# Patient Record
Sex: MALE | Race: WHITE | Employment: FULL TIME
[De-identification: names, ages, dates, MRNs, and addresses within clinical notes are randomized per-mention and may not be internally consistent; named-entity substitution may affect disease eponyms.]

---

## 2017-01-12 ENCOUNTER — OFFICE VISIT (OUTPATIENT)
Dept: INTERNAL MEDICINE | Facility: CLINIC | Age: 27
End: 2017-01-12

## 2017-01-12 VITALS
DIASTOLIC BLOOD PRESSURE: 80 MMHG | WEIGHT: 203 LBS | BODY MASS INDEX: 29.98 KG/M2 | HEART RATE: 94 BPM | SYSTOLIC BLOOD PRESSURE: 128 MMHG

## 2017-01-12 DIAGNOSIS — E66.3 OVERWEIGHT (BMI 25.0-29.9): ICD-10-CM

## 2017-01-12 DIAGNOSIS — S09.93XD FACIAL INJURY, SUBSEQUENT ENCOUNTER: Primary | ICD-10-CM

## 2017-01-12 PROBLEM — S09.93XA FACIAL INJURY: Status: ACTIVE | Noted: 2017-01-12

## 2017-01-12 PROCEDURE — 99213 OFFICE O/P EST LOW 20 MIN: CPT | Performed by: INTERNAL MEDICINE

## 2017-01-12 ASSESSMENT — ENCOUNTER SYMPTOMS
BACK PAIN: 0
ABDOMINAL PAIN: 0
PHOTOPHOBIA: 0
EYE ITCHING: 0
APNEA: 0
ABDOMINAL DISTENTION: 0
CHOKING: 0
EYE DISCHARGE: 0
CHEST TIGHTNESS: 0
EYE REDNESS: 1
EYE PAIN: 0

## 2017-01-12 ASSESSMENT — PATIENT HEALTH QUESTIONNAIRE - PHQ9
SUM OF ALL RESPONSES TO PHQ QUESTIONS 1-9: 0
2. FEELING DOWN, DEPRESSED OR HOPELESS: 0
SUM OF ALL RESPONSES TO PHQ9 QUESTIONS 1 & 2: 0
1. LITTLE INTEREST OR PLEASURE IN DOING THINGS: 0

## 2017-11-25 ENCOUNTER — HOSPITAL ENCOUNTER (EMERGENCY)
Age: 27
Discharge: HOME | End: 2017-11-25
Attending: EMERGENCY MEDICINE
Payer: COMMERCIAL

## 2017-11-25 ENCOUNTER — APPOINTMENT (OUTPATIENT)
Dept: GENERAL RADIOLOGY | Age: 27
End: 2017-11-25
Payer: COMMERCIAL

## 2017-11-25 VITALS
HEART RATE: 74 BPM | SYSTOLIC BLOOD PRESSURE: 146 MMHG | WEIGHT: 200 LBS | HEIGHT: 67 IN | OXYGEN SATURATION: 98 % | BODY MASS INDEX: 31.39 KG/M2 | DIASTOLIC BLOOD PRESSURE: 73 MMHG | RESPIRATION RATE: 16 BRPM | TEMPERATURE: 97.5 F

## 2017-11-25 DIAGNOSIS — M54.6 ACUTE BILATERAL THORACIC BACK PAIN: Primary | ICD-10-CM

## 2017-11-25 PROCEDURE — 6370000000 HC RX 637 (ALT 250 FOR IP): Performed by: NURSE PRACTITIONER

## 2017-11-25 PROCEDURE — 99283 EMERGENCY DEPT VISIT LOW MDM: CPT

## 2017-11-25 PROCEDURE — 72072 X-RAY EXAM THORAC SPINE 3VWS: CPT

## 2017-11-25 RX ORDER — CYCLOBENZAPRINE HCL 10 MG
10 TABLET ORAL ONCE
Status: COMPLETED | OUTPATIENT
Start: 2017-11-25 | End: 2017-11-25

## 2017-11-25 RX ORDER — NAPROXEN 500 MG/1
500 TABLET ORAL 2 TIMES DAILY
Qty: 20 TABLET | Refills: 0 | Status: SHIPPED | OUTPATIENT
Start: 2017-11-25 | End: 2019-10-23

## 2017-11-25 RX ORDER — CYCLOBENZAPRINE HCL 10 MG
10 TABLET ORAL 3 TIMES DAILY PRN
Qty: 15 TABLET | Refills: 0 | Status: SHIPPED | OUTPATIENT
Start: 2017-11-25 | End: 2019-10-23

## 2017-11-25 RX ORDER — NAPROXEN 250 MG/1
500 TABLET ORAL ONCE
Status: COMPLETED | OUTPATIENT
Start: 2017-11-25 | End: 2017-11-25

## 2017-11-25 RX ADMIN — NAPROXEN 500 MG: 250 TABLET ORAL at 21:59

## 2017-11-25 RX ADMIN — CYCLOBENZAPRINE HYDROCHLORIDE 10 MG: 10 TABLET, FILM COATED ORAL at 21:59

## 2017-11-25 ASSESSMENT — ENCOUNTER SYMPTOMS
NAUSEA: 0
SHORTNESS OF BREATH: 0
COUGH: 0
BACK PAIN: 1
TROUBLE SWALLOWING: 0
VOMITING: 0

## 2017-11-25 ASSESSMENT — PAIN DESCRIPTION - FREQUENCY: FREQUENCY: CONTINUOUS

## 2017-11-25 ASSESSMENT — PAIN SCALES - GENERAL
PAINLEVEL_OUTOF10: 9
PAINLEVEL_OUTOF10: 10
PAINLEVEL_OUTOF10: 10

## 2017-11-25 ASSESSMENT — PAIN DESCRIPTION - DESCRIPTORS: DESCRIPTORS: BURNING

## 2017-11-25 ASSESSMENT — PAIN DESCRIPTION - LOCATION: LOCATION: BACK

## 2017-11-25 ASSESSMENT — PAIN DESCRIPTION - ORIENTATION: ORIENTATION: UPPER

## 2017-11-26 NOTE — ED PROVIDER NOTES
[address]  eMERGENCY dEPARTMENT eNCOUnter   Independent Attestation     Pt Name: Sheyla Buck  MRN: [id number]  [place] 1990  Date of evaluation: 11/25/17       Sheyla Buck is a 33 y.o. male who presents with Back Pain (c/o upper back pain x 1 month, denies trauma, has taken tylenol and motrin without relief)      Vitals:   Vitals:    11/25/17 2137   BP: (!) 146/73   Pulse: 74   Resp: 16   Temp: 97.5 °F (36.4 °C)   SpO2: 98%   Weight: 200 lb (90.7 kg)   Height: 5' 7\" (1.702 m)       Impression:   1. Acute bilateral thoracic back pain          Based on the medical record, the care appears appropriate. I was personally available for consultation in the Emergency Department.     Malcolm Avila MD  Attending Emergency  Physician                Malcolm Avila MD  11/25/17 2105
without signs of spinal cord compression, cauda equina syndrome, infection, aneurysm or other serious etiology. The patient is neurologically intact. Given the extremely low risk of these diagnoses further testing and evaluation for these possibilities does not appear to be indicated at this time. The patient has been instructed to return if the symptoms worsen or change in any way. Vitals:    Vitals:    11/25/17 2137   BP: (!) 146/73   Pulse: 74   Resp: 16   Temp: 97.5 °F (36.4 °C)   SpO2: 98%   Weight: 200 lb (90.7 kg)   Height: 5' 7\" (1.702 m)       Vitals reviewed. PROCEDURES:  none    FINAL IMPRESSION      1.  Acute bilateral thoracic back pain          DISPOSITION/PLAN   DISPOSITION     PATIENT REFERRED TO:  Teresa Nevarez MD  [address] [facility]  [address] [facility] [address]  [address] [facility]  [phone]    Schedule an appointment as soon as possible for a visit   Follow up visit    [facility] ED  [facility] [address] [phone]    If symptoms worsen      DISCHARGE MEDICATIONS:  New Prescriptions    CYCLOBENZAPRINE (FLEXERIL) 10 MG TABLET    Take 1 tablet by mouth 3 times daily as needed for Muscle spasms    NAPROXEN (NAPROSYN) 500 MG TABLET    Take 1 tablet by mouth 2 times daily       (Please note that portions of this note were completed with a voice recognition program.  Efforts were made to edit the dictations but occasionally words are mis-transcribed.)    Jayden Anand, CNP  11/25/17 [address], CNP  11/25/17 5335

## 2017-11-29 ENCOUNTER — OFFICE VISIT (OUTPATIENT)
Dept: INTERNAL MEDICINE CLINIC | Age: 27
End: 2017-11-29
Payer: COMMERCIAL

## 2017-11-29 VITALS
HEART RATE: 78 BPM | SYSTOLIC BLOOD PRESSURE: 110 MMHG | BODY MASS INDEX: 32.26 KG/M2 | DIASTOLIC BLOOD PRESSURE: 80 MMHG | WEIGHT: 206 LBS | OXYGEN SATURATION: 99 %

## 2017-11-29 DIAGNOSIS — M54.6 ACUTE MIDLINE THORACIC BACK PAIN: ICD-10-CM

## 2017-11-29 DIAGNOSIS — R68.89 DECREASED EXERCISE TOLERANCE: ICD-10-CM

## 2017-11-29 DIAGNOSIS — M79.18 MUSCULOSKELETAL PAIN: Primary | ICD-10-CM

## 2017-11-29 DIAGNOSIS — Z00.00 PREVENTATIVE HEALTH CARE: ICD-10-CM

## 2017-11-29 DIAGNOSIS — Z87.891 FORMER SMOKER: ICD-10-CM

## 2017-11-29 DIAGNOSIS — R07.9 CHEST PAIN, UNSPECIFIED TYPE: ICD-10-CM
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
## 2017-11-29 PROCEDURE — G8484 FLU IMMUNIZE NO ADMIN: HCPCS | Performed by: NURSE PRACTITIONER

## 2017-11-29 PROCEDURE — G8427 DOCREV CUR MEDS BY ELIG CLIN: HCPCS | Performed by: NURSE PRACTITIONER

## 2017-11-29 PROCEDURE — 99213 OFFICE O/P EST LOW 20 MIN: CPT | Performed by: NURSE PRACTITIONER

## 2017-11-29 PROCEDURE — 1036F TOBACCO NON-USER: CPT | Performed by: NURSE PRACTITIONER

## 2017-11-29 PROCEDURE — G8417 CALC BMI ABV UP PARAM F/U: HCPCS | Performed by: NURSE PRACTITIONER

## 2017-11-29 ASSESSMENT — ENCOUNTER SYMPTOMS
BACK PAIN: 1
BLOOD IN STOOL: 0
COUGH: 0
EYE DISCHARGE: 0
ABDOMINAL PAIN: 0
SHORTNESS OF BREATH: 1

## 2017-11-29 NOTE — PROGRESS NOTES
[facility] PHYSICIANS  [facility]  [address]  [address] [facility] [address]  Dept: [phone]  Dept Fax: [phone]    Sonya Frausto is a 33 y.o. male who presents today for his medical conditions/complaints as noted below. Sonya Frausto is c/o of Back Pain (pt is here today for a follow up on an er visit for back pain)        HPI:     Patient presents with:    Pain constant, dur x 4 w; using otc nsaids, was brought to tears , went to ER. 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 Not taking musc relax at work d/t alertness , using at bedtime. When Breathe ,cough has pain in thoracic spine, L>R   Chest pain last night. Fast walking , running,  Dur x sev yrs.   , lifts a lot. History reviewed. No pertinent past medical history. History reviewed. No pertinent surgical history. Family History   Problem Relation Age of Onset    Heart Disease Maternal Grandmother     Heart Disease Maternal Grandfather     Heart Attack Father      'minor heart attack' 40s        Social History   Substance Use Topics    Smoking status: Former Smoker     Packs/day: 1.00     Years: 10.00     Types: Cigarettes    Smokeless tobacco: Never Used      Comment: quit oct 2016    Alcohol use 0.0 oz/week      Comment: social      Current Outpatient Prescriptions   Medication Sig Dispense Refill    naproxen (NAPROSYN) 500 MG tablet Take 1 tablet by mouth 2 times daily 20 tablet 0    cyclobenzaprine (FLEXERIL) 10 MG tablet Take 1 tablet by mouth 3 times daily as needed for Muscle spasms 15 tablet 0    Omeprazole 20 MG TBEC Take 1 tablet by mouth 2 times daily 60 tablet 3     No current facility-administered medications for this visit.       Allergies   Allergen Reactions    Penicillins      All cillins Subjective:      Review of Systems   Constitutional: Negative for activity change, chills, fatigue and fever. Eyes: Negative for discharge and visual disturbance. Respiratory: Positive for shortness of breath. Negative for cough. Cardiovascular: Positive for chest pain. Negative for leg swelling.        + decrease exerc tolerance    Gastrointestinal: Negative for abdominal pain and blood in stool. Endocrine: Negative for cold intolerance and heat intolerance. Genitourinary: Negative for dysuria and flank pain. Musculoskeletal: Positive for back pain. Negative for joint swelling and myalgias. Skin: Negative for pallor and rash. Neurological: Negative for dizziness and headaches. Psychiatric/Behavioral: Negative for hallucinations and suicidal ideas. Objective:     Physical Exam   Constitutional: He is oriented to person, place, and time. He appears well-developed and well-nourished. HENT:   Head: Normocephalic and atraumatic. Eyes: EOM are normal.   Cardiovascular: Normal rate, regular rhythm and normal heart sounds. Pulmonary/Chest: Effort normal and breath sounds normal.   Cp not reproducible    Abdominal: Soft. Bowel sounds are normal.   Musculoskeletal:        Arms:  Neurological: He is alert and oriented to person, place, and time. Skin: Skin is warm and dry. Psychiatric: He has a normal mood and affect. Nursing note and vitals reviewed.     /80   Pulse 78   Wt 206 lb (93.4 kg)   SpO2 99%   BMI 32.26 kg/m²     CBC:   Lab Results   Component Value Date    WBC 10.0 07/25/2016    RBC 5.14 07/25/2016    HGB 15.5 07/25/2016    HCT 45.0 07/25/2016    MCV 87.6 07/25/2016    MCH 30.1 07/25/2016    MCHC 34.4 07/25/2016    RDW 12.7 07/25/2016     07/25/2016    MPV 8.8 07/25/2016     CMP:    Lab Results   Component Value Date     09/29/2016    K 4.4 09/29/2016     09/29/2016    CO2 25 09/29/2016    BUN 20 09/29/2016    CREATININE 0.77 09/29/2016 GFRAA >60 09/29/2016    LABGLOM >60 09/29/2016    GLUCOSE 101 09/29/2016    PROT 7.2 09/29/2016    LABALBU 4.5 09/29/2016    CALCIUM 9.6 09/29/2016    BILITOT 0.65 09/29/2016    ALKPHOS 111 09/29/2016    AST 17 09/29/2016    ALT 35 09/29/2016     Lab Results   Component Value Date    LABA1C 5.1 03/26/2016     EXAMINATION:   3 VIEWS OF THE THORACIC SPINE       11/25/2017 10:08 pm       COMPARISON:   None.       HISTORY:   ORDERING SYSTEM PROVIDED HISTORY: pain   TECHNOLOGIST PROVIDED HISTORY:   Reason for exam:->pain   Ordering Physician Provided Reason for Exam: pain   Acuity: Unknown   Type of Exam: Initial   Additional signs and symptoms: Pt c/o burning sensation to upper posterior   thoracic spine for 4 weeks. No injury.       FINDINGS:   Thoracic vertebral bodies are normal in height and alignment.  Intervertebral   disc spaces are maintained.  No significant degenerative changes.  No   evidence of fracture. Pedicles are symmetric and intact.       Visualized lungs are clear.           Impression   Unremarkable examination of the thoracic spine               Assessment:      1. Musculoskeletal pain     2. Acute midline thoracic back pain     3. Overuse injury     4. Chest pain, unspecified type  NM Myocardial Spect Rest Exercise or Rx    EKG 12 Lead   5. Decreased exercise tolerance  NM Myocardial Spect Rest Exercise or Rx    EKG 12 Lead   6. Preventative health care  Comprehensive Metabolic Panel    CBC   7. Former smoker         Plan:      Next rx try tizanidine for muscle pain   Cont nsaids. NM stress for cp . Not reproduc   Electa Ar received counseling on the following healthy behaviors: medication adherence  Reviewed prior labs and health maintenance. Continue current medications, diet and exercise. Discussed use, benefit, and side effects of prescribed medications. Barriers to medication compliance addressed. Patient given educational materials - see patient instructions.     All patient questions answered. Patient voiced understanding. Return in about 1 month (around 12/29/2017). Orders Placed This Encounter   Procedures    NM Myocardial Spect Rest Exercise or Rx     Standing Status:   Future     Standing Expiration Date:   11/29/2018     Order Specific Question:   Reason for Exam?     Answer:   Chest pain    Comprehensive Metabolic Panel     Standing Status:   Future     Standing Expiration Date:   11/29/2018    CBC     Standing Status:   Future     Standing Expiration Date:   11/29/2018    EKG 12 Lead     Standing Status:   Future     Standing Expiration Date:   11/29/2018     Order Specific Question:   Reason for Exam?     Answer:   Chest pain     No orders of the defined types were placed in this encounter. Patient given verbal and/or written educational instructions. Follow up as directed. I have reviewed and agree with the nursing documentation.     Electronically signed by Km Benjamin NP on 11/29/2017 at 10:05 AM

## 2017-12-27 ENCOUNTER — TELEPHONE (OUTPATIENT)
Dept: INTERNAL MEDICINE CLINIC | Age: 27
End: 2017-12-27

## 2017-12-27 DIAGNOSIS — R07.9 CHEST PAIN, UNSPECIFIED TYPE: Primary | ICD-10-CM

## 2017-12-27 NOTE — TELEPHONE ENCOUNTER
Pt called me again and stated the insurance does not like the stress test that Kimo Boogie ordered for pt to complete. They would like pt to do stress test with treadmill instead. Please advise if ok to have pt do this testing instead.

## 2018-01-04 ENCOUNTER — HOSPITAL ENCOUNTER (OUTPATIENT)
Dept: NON INVASIVE DIAGNOSTICS | Age: 28
Discharge: HOME | End: 2018-01-04
Payer: COMMERCIAL

## 2018-01-04 DIAGNOSIS — R07.9 CHEST PAIN, UNSPECIFIED TYPE: ICD-10-CM

## 2018-01-04 PROCEDURE — 93017 CV STRESS TEST TRACING ONLY: CPT

## 2018-01-31 ENCOUNTER — TELEPHONE (OUTPATIENT)
Dept: INTERNAL MEDICINE CLINIC | Age: 28
End: 2018-01-31

## 2018-01-31 NOTE — LETTER
[facility]  [address] [place] [address]  Phone: [phone]  Fax: [phone]      January 31, 2018     [address] [id number]      Dear [facility]:    I am writing you because I have been informed of your missed appointment(s). We ask that you please call 24 hours in advance if you are unable to make your appointment so that we can give that time to another patient in need. We care about you and the management of your healthcare and want to make sure that you follow up as recommended. I have to also mention, that in an effort to provide quality care and timely appointments to all my patients, it is our policy that patients be discharged from the practice if they do not show for three scheduled appointments. You would be informed of this termination by mail, and would have 30 days from the date of discharge to locate another physician. We're sorry you were unable to keep your appointment and hope that you are doing well. We would like to continue treating your healthcare needs. Please call the office to let us know your plans for treatment and to reschedule your appointment.      Sincerely,        Elmo Modi NP

## 2018-05-13 ENCOUNTER — HOSPITAL ENCOUNTER (EMERGENCY)
Age: 28
Discharge: HOME | End: 2018-05-13
Attending: EMERGENCY MEDICINE
Payer: COMMERCIAL

## 2018-05-13 VITALS
TEMPERATURE: 97.6 F | BODY MASS INDEX: 30.8 KG/M2 | RESPIRATION RATE: 18 BRPM | SYSTOLIC BLOOD PRESSURE: 123 MMHG | HEART RATE: 65 BPM | WEIGHT: 220 LBS | DIASTOLIC BLOOD PRESSURE: 69 MMHG | OXYGEN SATURATION: 98 % | HEIGHT: 71 IN

## 2018-05-13 DIAGNOSIS — R19.7 DIARRHEA, UNSPECIFIED TYPE: Primary | ICD-10-CM

## 2018-05-13 LAB
ABSOLUTE EOS #: 0.1 K/UL (ref 0–0.4)
ABSOLUTE IMMATURE GRANULOCYTE: ABNORMAL K/UL (ref 0–0.3)
ABSOLUTE LYMPH #: 2.1 K/UL (ref 1–4.8)
ABSOLUTE MONO #: 0.8 K/UL (ref 0.1–1.3)
ALBUMIN SERPL-MCNC: 4.4 G/DL (ref 3.5–5.2)
ALBUMIN/GLOBULIN RATIO: ABNORMAL (ref 1–2.5)
ALP BLD-CCNC: 143 U/L (ref 40–129)
ALT SERPL-CCNC: 29 U/L (ref 5–41)
ANION GAP SERPL CALCULATED.3IONS-SCNC: 9 MMOL/L (ref 9–17)
AST SERPL-CCNC: 17 U/L
BASOPHILS # BLD: 1 % (ref 0–2)
BASOPHILS ABSOLUTE: 0 K/UL (ref 0–0.2)
BILIRUB SERPL-MCNC: 0.45 MG/DL (ref 0.3–1.2)
BUN BLDV-MCNC: 12 MG/DL (ref 6–20)
BUN/CREAT BLD: ABNORMAL (ref 9–20)
CALCIUM SERPL-MCNC: 8.7 MG/DL (ref 8.6–10.4)
CHLORIDE BLD-SCNC: 102 MMOL/L (ref 98–107)
CO2: 27 MMOL/L (ref 20–31)
CREAT SERPL-MCNC: 0.77 MG/DL (ref 0.7–1.2)
DIFFERENTIAL TYPE: ABNORMAL
EOSINOPHILS RELATIVE PERCENT: 2 % (ref 0–4)
GFR AFRICAN AMERICAN: >60 ML/MIN
GFR NON-AFRICAN AMERICAN: >60 ML/MIN
GFR SERPL CREATININE-BSD FRML MDRD: ABNORMAL ML/MIN/{1.73_M2}
GFR SERPL CREATININE-BSD FRML MDRD: ABNORMAL ML/MIN/{1.73_M2}
GLUCOSE BLD-MCNC: 97 MG/DL (ref 70–99)
HCT VFR BLD CALC: 47 % (ref 41–53)
HEMOGLOBIN: 16 G/DL (ref 13.5–17.5)
IMMATURE GRANULOCYTES: ABNORMAL %
LIPASE: 14 U/L (ref 13–60)
LYMPHOCYTES # BLD: 35 % (ref 24–44)
MCH RBC QN AUTO: 29.3 PG (ref 26–34)
MCHC RBC AUTO-ENTMCNC: 34.1 G/DL (ref 31–37)
MCV RBC AUTO: 85.9 FL (ref 80–100)
MONOCYTES # BLD: 14 % (ref 1–7)
NRBC AUTOMATED: ABNORMAL PER 100 WBC
PDW BLD-RTO: 13 % (ref 11.5–14.9)
PLATELET # BLD: 245 K/UL (ref 150–450)
PLATELET ESTIMATE: ABNORMAL
PMV BLD AUTO: 8.6 FL (ref 6–12)
POTASSIUM SERPL-SCNC: 4.2 MMOL/L (ref 3.7–5.3)
RBC # BLD: 5.47 M/UL (ref 4.5–5.9)
RBC # BLD: ABNORMAL 10*6/UL
SEG NEUTROPHILS: 48 % (ref 36–66)
SEGMENTED NEUTROPHILS ABSOLUTE COUNT: 2.9 K/UL (ref 1.3–9.1)
SODIUM BLD-SCNC: 138 MMOL/L (ref 135–144)
TOTAL PROTEIN: 7 G/DL (ref 6.4–8.3)
WBC # BLD: 6.1 K/UL (ref 3.5–11)
WBC # BLD: ABNORMAL 10*3/UL

## 2018-05-13 PROCEDURE — 2580000003 HC RX 258: Performed by: EMERGENCY MEDICINE

## 2018-05-13 PROCEDURE — 36415 COLL VENOUS BLD VENIPUNCTURE: CPT

## 2018-05-13 PROCEDURE — 99284 EMERGENCY DEPT VISIT MOD MDM: CPT

## 2018-05-13 PROCEDURE — 85025 COMPLETE CBC W/AUTO DIFF WBC: CPT

## 2018-05-13 PROCEDURE — 80053 COMPREHEN METABOLIC PANEL: CPT

## 2018-05-13 PROCEDURE — 6360000002 HC RX W HCPCS: Performed by: EMERGENCY MEDICINE

## 2018-05-13 PROCEDURE — 83690 ASSAY OF LIPASE: CPT

## 2018-05-13 RX ORDER — ONDANSETRON 4 MG/1
4 TABLET, ORALLY DISINTEGRATING ORAL ONCE
Status: COMPLETED | OUTPATIENT
Start: 2018-05-13 | End: 2018-05-13

## 2018-05-13 RX ORDER — 0.9 % SODIUM CHLORIDE 0.9 %
1000 INTRAVENOUS SOLUTION INTRAVENOUS ONCE
Status: COMPLETED | OUTPATIENT
Start: 2018-05-13 | End: 2018-05-13

## 2018-05-13 RX ADMIN — ONDANSETRON 4 MG: 4 TABLET, ORALLY DISINTEGRATING ORAL at 09:04

## 2018-05-13 RX ADMIN — SODIUM CHLORIDE 1000 ML: 9 INJECTION, SOLUTION INTRAVENOUS at 09:04

## 2018-05-13 ASSESSMENT — ENCOUNTER SYMPTOMS
ABDOMINAL PAIN: 0
VOMITING: 0
EYES NEGATIVE: 1
BACK PAIN: 0
COUGH: 0
SHORTNESS OF BREATH: 0
DIARRHEA: 1
RESPIRATORY NEGATIVE: 1

## 2018-09-25 ENCOUNTER — HOSPITAL ENCOUNTER (OUTPATIENT)
Age: 28
Discharge: HOME | End: 2018-09-25
Payer: COMMERCIAL

## 2018-09-25 LAB
ALBUMIN SERPL-MCNC: 4.4 G/DL (ref 3.5–5.2)
ALBUMIN/GLOBULIN RATIO: ABNORMAL (ref 1–2.5)
ALP BLD-CCNC: 132 U/L (ref 40–129)
ALT SERPL-CCNC: 41 U/L (ref 5–41)
ANION GAP SERPL CALCULATED.3IONS-SCNC: 11 MMOL/L (ref 9–17)
AST SERPL-CCNC: 19 U/L
BILIRUB SERPL-MCNC: 0.55 MG/DL (ref 0.3–1.2)
BUN BLDV-MCNC: 15 MG/DL (ref 6–20)
BUN/CREAT BLD: ABNORMAL (ref 9–20)
CALCIUM SERPL-MCNC: 9.6 MG/DL (ref 8.6–10.4)
CHLORIDE BLD-SCNC: 102 MMOL/L (ref 98–107)
CO2: 27 MMOL/L (ref 20–31)
CREAT SERPL-MCNC: 0.83 MG/DL (ref 0.7–1.2)
GFR AFRICAN AMERICAN: >60 ML/MIN
GFR NON-AFRICAN AMERICAN: >60 ML/MIN
GFR SERPL CREATININE-BSD FRML MDRD: ABNORMAL ML/MIN/{1.73_M2}
GFR SERPL CREATININE-BSD FRML MDRD: ABNORMAL ML/MIN/{1.73_M2}
GLUCOSE BLD-MCNC: 105 MG/DL (ref 70–99)
HCT VFR BLD CALC: 46.7 % (ref 41–53)
HEMOGLOBIN: 16 G/DL (ref 13.5–17.5)
MCH RBC QN AUTO: 29.5 PG (ref 26–34)
MCHC RBC AUTO-ENTMCNC: 34.2 G/DL (ref 31–37)
MCV RBC AUTO: 86.3 FL (ref 80–100)
NRBC AUTOMATED: NORMAL PER 100 WBC
PDW BLD-RTO: 13.1 % (ref 11.5–14.9)
PLATELET # BLD: 276 K/UL (ref 150–450)
PMV BLD AUTO: 8.4 FL (ref 6–12)
POTASSIUM SERPL-SCNC: 4.5 MMOL/L (ref 3.7–5.3)
RBC # BLD: 5.41 M/UL (ref 4.5–5.9)
SODIUM BLD-SCNC: 140 MMOL/L (ref 135–144)
TOTAL PROTEIN: 7.3 G/DL (ref 6.4–8.3)
TSH SERPL DL<=0.05 MIU/L-ACNC: 1.81 MIU/L (ref 0.3–5)
WBC # BLD: 6.2 K/UL (ref 3.5–11)

## 2018-09-25 PROCEDURE — 84443 ASSAY THYROID STIM HORMONE: CPT

## 2018-09-25 PROCEDURE — 85027 COMPLETE CBC AUTOMATED: CPT

## 2018-09-25 PROCEDURE — 80053 COMPREHEN METABOLIC PANEL: CPT

## 2018-09-25 PROCEDURE — 36415 COLL VENOUS BLD VENIPUNCTURE: CPT

## 2018-09-25 PROCEDURE — 83516 IMMUNOASSAY NONANTIBODY: CPT

## 2018-09-26 LAB — TISSUE TRANSGLUTAMINASE IGA: 0.6 U/ML

## 2019-10-14 ENCOUNTER — HOSPITAL ENCOUNTER (EMERGENCY)
Age: 29
Discharge: HOME | End: 2019-10-14
Attending: EMERGENCY MEDICINE
Payer: COMMERCIAL

## 2019-10-14 ENCOUNTER — APPOINTMENT (OUTPATIENT)
Dept: GENERAL RADIOLOGY | Age: 29
End: 2019-10-14
Payer: COMMERCIAL

## 2019-10-14 VITALS
TEMPERATURE: 98.6 F | RESPIRATION RATE: 18 BRPM | BODY MASS INDEX: 30.31 KG/M2 | HEART RATE: 68 BPM | SYSTOLIC BLOOD PRESSURE: 136 MMHG | WEIGHT: 200 LBS | HEIGHT: 68 IN | OXYGEN SATURATION: 97 % | DIASTOLIC BLOOD PRESSURE: 76 MMHG

## 2019-10-14 DIAGNOSIS — R42 DIZZINESS: Primary | ICD-10-CM

## 2019-10-14 LAB
ABSOLUTE EOS #: 0.2 K/UL (ref 0–0.4)
ABSOLUTE IMMATURE GRANULOCYTE: ABNORMAL K/UL (ref 0–0.3)
ABSOLUTE LYMPH #: 4.1 K/UL (ref 1–4.8)
ABSOLUTE MONO #: 1.5 K/UL (ref 0.1–1.3)
ANION GAP SERPL CALCULATED.3IONS-SCNC: 14 MMOL/L (ref 9–17)
BASOPHILS # BLD: 1 % (ref 0–2)
BASOPHILS ABSOLUTE: 0.1 K/UL (ref 0–0.2)
BUN BLDV-MCNC: 16 MG/DL (ref 6–20)
BUN/CREAT BLD: NORMAL (ref 9–20)
CALCIUM SERPL-MCNC: 9.3 MG/DL (ref 8.6–10.4)
CHLORIDE BLD-SCNC: 100 MMOL/L (ref 98–107)
CO2: 26 MMOL/L (ref 20–31)
CREAT SERPL-MCNC: 1.12 MG/DL (ref 0.7–1.2)
D-DIMER QUANTITATIVE: <0.27 MG/L FEU (ref 0–0.59)
DIFFERENTIAL TYPE: ABNORMAL
EOSINOPHILS RELATIVE PERCENT: 1 % (ref 0–4)
GFR AFRICAN AMERICAN: >60 ML/MIN
GFR NON-AFRICAN AMERICAN: >60 ML/MIN
GFR SERPL CREATININE-BSD FRML MDRD: NORMAL ML/MIN/{1.73_M2}
GFR SERPL CREATININE-BSD FRML MDRD: NORMAL ML/MIN/{1.73_M2}
GLUCOSE BLD-MCNC: 99 MG/DL (ref 70–99)
HCT VFR BLD CALC: 42.7 % (ref 41–53)
HEMOGLOBIN: 14.5 G/DL (ref 13.5–17.5)
IMMATURE GRANULOCYTES: ABNORMAL %
LYMPHOCYTES # BLD: 27 % (ref 24–44)
MCH RBC QN AUTO: 29 PG (ref 26–34)
MCHC RBC AUTO-ENTMCNC: 34 G/DL (ref 31–37)
MCV RBC AUTO: 85.2 FL (ref 80–100)
MONOCYTES # BLD: 10 % (ref 1–7)
MYOGLOBIN: 25 NG/ML (ref 28–72)
NRBC AUTOMATED: ABNORMAL PER 100 WBC
PDW BLD-RTO: 12.6 % (ref 11.5–14.9)
PLATELET # BLD: 300 K/UL (ref 150–450)
PLATELET ESTIMATE: ABNORMAL
PMV BLD AUTO: 8.5 FL (ref 6–12)
POTASSIUM SERPL-SCNC: 3.8 MMOL/L (ref 3.7–5.3)
RBC # BLD: 5.02 M/UL (ref 4.5–5.9)
RBC # BLD: ABNORMAL 10*6/UL
SEG NEUTROPHILS: 61 % (ref 36–66)
SEGMENTED NEUTROPHILS ABSOLUTE COUNT: 9 K/UL (ref 1.3–9.1)
SODIUM BLD-SCNC: 140 MMOL/L (ref 135–144)
THYROXINE, FREE: 1.38 NG/DL (ref 0.93–1.7)
TROPONIN INTERP: ABNORMAL
TROPONIN T: ABNORMAL NG/ML
TROPONIN, HIGH SENSITIVITY: <6 NG/L (ref 0–22)
TSH SERPL DL<=0.05 MIU/L-ACNC: 4.23 MIU/L (ref 0.3–5)
WBC # BLD: 14.9 K/UL (ref 3.5–11)
WBC # BLD: ABNORMAL 10*3/UL

## 2019-10-14 PROCEDURE — 85379 FIBRIN DEGRADATION QUANT: CPT

## 2019-10-14 PROCEDURE — 71046 X-RAY EXAM CHEST 2 VIEWS: CPT

## 2019-10-14 PROCEDURE — 84439 ASSAY OF FREE THYROXINE: CPT

## 2019-10-14 PROCEDURE — 93005 ELECTROCARDIOGRAM TRACING: CPT | Performed by: EMERGENCY MEDICINE

## 2019-10-14 PROCEDURE — 84484 ASSAY OF TROPONIN QUANT: CPT

## 2019-10-14 PROCEDURE — 80048 BASIC METABOLIC PNL TOTAL CA: CPT

## 2019-10-14 PROCEDURE — 83874 ASSAY OF MYOGLOBIN: CPT

## 2019-10-14 PROCEDURE — 85025 COMPLETE CBC W/AUTO DIFF WBC: CPT

## 2019-10-14 PROCEDURE — 36415 COLL VENOUS BLD VENIPUNCTURE: CPT

## 2019-10-14 PROCEDURE — 6370000000 HC RX 637 (ALT 250 FOR IP): Performed by: EMERGENCY MEDICINE

## 2019-10-14 PROCEDURE — 99285 EMERGENCY DEPT VISIT HI MDM: CPT

## 2019-10-14 PROCEDURE — 84443 ASSAY THYROID STIM HORMONE: CPT

## 2019-10-14 RX ORDER — MECLIZINE HYDROCHLORIDE 25 MG/1
25 TABLET ORAL 3 TIMES DAILY PRN
Qty: 30 TABLET | Refills: 0 | Status: SHIPPED | OUTPATIENT
Start: 2019-10-14 | End: 2019-10-24

## 2019-10-14 RX ORDER — MECLIZINE HYDROCHLORIDE 25 MG/1
25 TABLET ORAL ONCE
Status: COMPLETED | OUTPATIENT
Start: 2019-10-14 | End: 2019-10-14

## 2019-10-14 RX ADMIN — MECLIZINE HYDROCHLORIDE 25 MG: 25 TABLET ORAL at 19:41

## 2019-10-14 ASSESSMENT — PAIN SCALES - GENERAL: PAINLEVEL_OUTOF10: 4

## 2019-10-14 ASSESSMENT — ENCOUNTER SYMPTOMS
NAUSEA: 0
FACIAL SWELLING: 0
CHEST TIGHTNESS: 0
SORE THROAT: 0
EYE DISCHARGE: 0
EYE REDNESS: 0
SHORTNESS OF BREATH: 1
VOMITING: 0
SINUS PRESSURE: 0
EYE PAIN: 0
WHEEZING: 0
ABDOMINAL PAIN: 0
CONSTIPATION: 0
BACK PAIN: 0
COLOR CHANGE: 0
TROUBLE SWALLOWING: 0
DIARRHEA: 0
RHINORRHEA: 0
COUGH: 0
BLOOD IN STOOL: 0

## 2019-10-14 ASSESSMENT — PAIN DESCRIPTION - PAIN TYPE: TYPE: ACUTE PAIN

## 2019-10-14 ASSESSMENT — PAIN DESCRIPTION - LOCATION: LOCATION: CHEST

## 2019-10-14 ASSESSMENT — PAIN DESCRIPTION - DESCRIPTORS: DESCRIPTORS: HEAVINESS

## 2019-10-15 ENCOUNTER — TELEPHONE (OUTPATIENT)
Dept: INTERNAL MEDICINE CLINIC | Age: 29
End: 2019-10-15

## 2019-10-15 LAB
EKG ATRIAL RATE: 86 BPM
EKG P AXIS: 50 DEGREES
EKG P-R INTERVAL: 132 MS
EKG Q-T INTERVAL: 358 MS
EKG QRS DURATION: 88 MS
EKG QTC CALCULATION (BAZETT): 428 MS
EKG R AXIS: 55 DEGREES
EKG T AXIS: 13 DEGREES
EKG VENTRICULAR RATE: 86 BPM

## 2019-10-15 PROCEDURE — 93010 ELECTROCARDIOGRAM REPORT: CPT | Performed by: INTERNAL MEDICINE

## 2019-10-23 ENCOUNTER — OFFICE VISIT (OUTPATIENT)
Dept: INTERNAL MEDICINE CLINIC | Age: 29
End: 2019-10-23
Payer: COMMERCIAL

## 2019-10-23 VITALS
SYSTOLIC BLOOD PRESSURE: 128 MMHG | HEART RATE: 96 BPM | WEIGHT: 234 LBS | HEIGHT: 68 IN | BODY MASS INDEX: 35.46 KG/M2 | DIASTOLIC BLOOD PRESSURE: 88 MMHG | OXYGEN SATURATION: 95 %

## 2019-10-23 DIAGNOSIS — R00.2 HEART PALPITATIONS: ICD-10-CM

## 2019-10-23 DIAGNOSIS — F32.A DEPRESSION, UNSPECIFIED DEPRESSION TYPE: ICD-10-CM

## 2019-10-23 DIAGNOSIS — F41.0 PANIC ATTACKS: Primary | ICD-10-CM

## 2019-10-23 PROCEDURE — 99214 OFFICE O/P EST MOD 30 MIN: CPT | Performed by: INTERNAL MEDICINE

## 2019-10-23 PROCEDURE — G8427 DOCREV CUR MEDS BY ELIG CLIN: HCPCS | Performed by: INTERNAL MEDICINE

## 2019-10-23 PROCEDURE — G8484 FLU IMMUNIZE NO ADMIN: HCPCS | Performed by: INTERNAL MEDICINE

## 2019-10-23 PROCEDURE — G8417 CALC BMI ABV UP PARAM F/U: HCPCS | Performed by: INTERNAL MEDICINE

## 2019-10-23 PROCEDURE — 1036F TOBACCO NON-USER: CPT | Performed by: INTERNAL MEDICINE

## 2019-10-23 RX ORDER — FLUOXETINE HYDROCHLORIDE 20 MG/1
20 CAPSULE ORAL DAILY
Qty: 30 CAPSULE | Refills: 3 | Status: SHIPPED | OUTPATIENT
Start: 2019-10-23 | End: 2019-12-05 | Stop reason: SDUPTHER

## 2019-10-23 ASSESSMENT — PATIENT HEALTH QUESTIONNAIRE - PHQ9
SUM OF ALL RESPONSES TO PHQ QUESTIONS 1-9: 0
1. LITTLE INTEREST OR PLEASURE IN DOING THINGS: 0
SUM OF ALL RESPONSES TO PHQ QUESTIONS 1-9: 0
2. FEELING DOWN, DEPRESSED OR HOPELESS: 0
SUM OF ALL RESPONSES TO PHQ9 QUESTIONS 1 & 2: 0

## 2019-10-24 ASSESSMENT — ENCOUNTER SYMPTOMS
EYE PAIN: 0
CHOKING: 0
EYE ITCHING: 0
BLOOD IN STOOL: 0
COUGH: 0
CONSTIPATION: 0
APNEA: 0
COLOR CHANGE: 0
ABDOMINAL PAIN: 0
CHEST TIGHTNESS: 0
EYE REDNESS: 0
ABDOMINAL DISTENTION: 0
EYE DISCHARGE: 0
SHORTNESS OF BREATH: 0
DIARRHEA: 0
BACK PAIN: 0

## 2019-11-07 ENCOUNTER — HOSPITAL ENCOUNTER (OUTPATIENT)
Dept: NON INVASIVE DIAGNOSTICS | Age: 29
Discharge: HOME | End: 2019-11-07
Payer: COMMERCIAL

## 2019-11-07 DIAGNOSIS — R00.2 HEART PALPITATIONS: ICD-10-CM

## 2019-11-07 PROCEDURE — 93226 XTRNL ECG REC<48 HR SCAN A/R: CPT

## 2019-11-07 PROCEDURE — 93225 XTRNL ECG REC<48 HRS REC: CPT

## 2019-11-13 ENCOUNTER — OFFICE VISIT (OUTPATIENT)
Dept: INTERNAL MEDICINE CLINIC | Age: 29
End: 2019-11-13
Payer: COMMERCIAL

## 2019-11-13 VITALS
HEIGHT: 68 IN | WEIGHT: 227 LBS | SYSTOLIC BLOOD PRESSURE: 122 MMHG | DIASTOLIC BLOOD PRESSURE: 74 MMHG | BODY MASS INDEX: 34.4 KG/M2

## 2019-11-13 DIAGNOSIS — F41.0 PANIC ATTACKS: Primary | ICD-10-CM

## 2019-11-13 PROCEDURE — 99213 OFFICE O/P EST LOW 20 MIN: CPT | Performed by: INTERNAL MEDICINE

## 2019-11-13 PROCEDURE — G8417 CALC BMI ABV UP PARAM F/U: HCPCS | Performed by: INTERNAL MEDICINE

## 2019-11-13 PROCEDURE — 1036F TOBACCO NON-USER: CPT | Performed by: INTERNAL MEDICINE

## 2019-11-13 PROCEDURE — G8427 DOCREV CUR MEDS BY ELIG CLIN: HCPCS | Performed by: INTERNAL MEDICINE

## 2019-11-13 PROCEDURE — G8484 FLU IMMUNIZE NO ADMIN: HCPCS | Performed by: INTERNAL MEDICINE

## 2019-11-24 ASSESSMENT — ENCOUNTER SYMPTOMS
EYE PAIN: 0
COUGH: 0
BACK PAIN: 0
COLOR CHANGE: 0
DIARRHEA: 0
APNEA: 0
ABDOMINAL DISTENTION: 0
CONSTIPATION: 0
CHEST TIGHTNESS: 0
ABDOMINAL PAIN: 0
SHORTNESS OF BREATH: 0
EYE REDNESS: 0
CHOKING: 0
EYE DISCHARGE: 0
BLOOD IN STOOL: 0
EYE ITCHING: 0

## 2019-12-05 ENCOUNTER — OFFICE VISIT (OUTPATIENT)
Dept: NEUROLOGY | Age: 29
End: 2019-12-05
Payer: COMMERCIAL

## 2019-12-05 VITALS
SYSTOLIC BLOOD PRESSURE: 123 MMHG | DIASTOLIC BLOOD PRESSURE: 79 MMHG | HEART RATE: 66 BPM | BODY MASS INDEX: 30.8 KG/M2 | WEIGHT: 220 LBS | HEIGHT: 71 IN

## 2019-12-05 DIAGNOSIS — R40.4 EPISODIC ALTERED AWARENESS: ICD-10-CM

## 2019-12-05 DIAGNOSIS — H53.8 BLURRED VISION: Primary | ICD-10-CM

## 2019-12-05 DIAGNOSIS — F41.0 PANIC ATTACKS: ICD-10-CM

## 2019-12-05 DIAGNOSIS — G43.009 MIGRAINE WITHOUT AURA AND WITHOUT STATUS MIGRAINOSUS, NOT INTRACTABLE: ICD-10-CM

## 2019-12-05 PROCEDURE — G8484 FLU IMMUNIZE NO ADMIN: HCPCS | Performed by: NURSE PRACTITIONER

## 2019-12-05 PROCEDURE — G8427 DOCREV CUR MEDS BY ELIG CLIN: HCPCS | Performed by: NURSE PRACTITIONER

## 2019-12-05 PROCEDURE — 99204 OFFICE O/P NEW MOD 45 MIN: CPT | Performed by: NURSE PRACTITIONER

## 2019-12-05 PROCEDURE — 1036F TOBACCO NON-USER: CPT | Performed by: NURSE PRACTITIONER

## 2019-12-05 PROCEDURE — G8417 CALC BMI ABV UP PARAM F/U: HCPCS | Performed by: NURSE PRACTITIONER

## 2019-12-05 RX ORDER — FLUOXETINE HYDROCHLORIDE 40 MG/1
40 CAPSULE ORAL DAILY
Qty: 30 CAPSULE | Refills: 5 | Status: SHIPPED | OUTPATIENT
Start: 2019-12-05 | End: 2020-05-19 | Stop reason: SDUPTHER

## 2019-12-05 RX ORDER — NAPROXEN 500 MG/1
TABLET ORAL
Refills: 0 | COMMUNITY
Start: 2019-12-02 | End: 2021-03-18 | Stop reason: ALTCHOICE

## 2019-12-05 RX ORDER — BUTALBITAL, ACETAMINOPHEN AND CAFFEINE 50; 325; 40 MG/1; MG/1; MG/1
1 TABLET ORAL EVERY 4 HOURS PRN
Qty: 180 TABLET | Refills: 3 | Status: SHIPPED | OUTPATIENT
Start: 2019-12-05 | End: 2020-06-04 | Stop reason: SDUPTHER

## 2019-12-24 ENCOUNTER — HOSPITAL ENCOUNTER (OUTPATIENT)
Dept: MRI IMAGING | Age: 29
Discharge: HOME | End: 2019-12-26
Payer: COMMERCIAL

## 2019-12-24 ENCOUNTER — HOSPITAL ENCOUNTER (OUTPATIENT)
Dept: NEUROLOGY | Age: 29
Discharge: HOME | End: 2019-12-24
Payer: COMMERCIAL

## 2019-12-24 DIAGNOSIS — H53.8 BLURRED VISION: ICD-10-CM

## 2019-12-24 DIAGNOSIS — R40.4 EPISODIC ALTERED AWARENESS: ICD-10-CM

## 2019-12-24 PROCEDURE — 95816 EEG AWAKE AND DROWSY: CPT | Performed by: PSYCHIATRY & NEUROLOGY

## 2019-12-24 PROCEDURE — 2580000003 HC RX 258: Performed by: NURSE PRACTITIONER

## 2019-12-24 PROCEDURE — 6360000004 HC RX CONTRAST MEDICATION: Performed by: NURSE PRACTITIONER

## 2019-12-24 PROCEDURE — A9579 GAD-BASE MR CONTRAST NOS,1ML: HCPCS | Performed by: NURSE PRACTITIONER

## 2019-12-24 PROCEDURE — 95816 EEG AWAKE AND DROWSY: CPT

## 2019-12-24 PROCEDURE — 70553 MRI BRAIN STEM W/O & W/DYE: CPT

## 2019-12-24 RX ORDER — SODIUM CHLORIDE 0.9 % (FLUSH) 0.9 %
10 SYRINGE (ML) INJECTION PRN
Status: DISCONTINUED | OUTPATIENT
Start: 2019-12-24 | End: 2019-12-27 | Stop reason: HOSPADM

## 2019-12-24 RX ADMIN — Medication 10 ML: at 11:13

## 2019-12-24 RX ADMIN — GADOTERIDOL 20 ML: 279.3 INJECTION, SOLUTION INTRAVENOUS at 11:13

## 2020-01-16 ENCOUNTER — OFFICE VISIT (OUTPATIENT)
Dept: NEUROLOGY | Age: 30
End: 2020-01-16
Payer: COMMERCIAL

## 2020-01-16 VITALS
BODY MASS INDEX: 32.06 KG/M2 | DIASTOLIC BLOOD PRESSURE: 76 MMHG | SYSTOLIC BLOOD PRESSURE: 130 MMHG | HEIGHT: 71 IN | WEIGHT: 229 LBS | HEART RATE: 74 BPM

## 2020-01-16 PROBLEM — G43.019 INTRACTABLE MIGRAINE WITHOUT AURA AND WITHOUT STATUS MIGRAINOSUS: Status: ACTIVE | Noted: 2019-12-05

## 2020-01-16 PROCEDURE — G8484 FLU IMMUNIZE NO ADMIN: HCPCS | Performed by: NURSE PRACTITIONER

## 2020-01-16 PROCEDURE — G8427 DOCREV CUR MEDS BY ELIG CLIN: HCPCS | Performed by: NURSE PRACTITIONER

## 2020-01-16 PROCEDURE — 99214 OFFICE O/P EST MOD 30 MIN: CPT | Performed by: NURSE PRACTITIONER

## 2020-01-16 PROCEDURE — G8417 CALC BMI ABV UP PARAM F/U: HCPCS | Performed by: NURSE PRACTITIONER

## 2020-01-16 PROCEDURE — 1036F TOBACCO NON-USER: CPT | Performed by: NURSE PRACTITIONER

## 2020-01-16 RX ORDER — TOPIRAMATE 25 MG/1
50 TABLET ORAL 2 TIMES DAILY
Qty: 120 TABLET | Refills: 3 | Status: SHIPPED | OUTPATIENT
Start: 2020-01-16 | End: 2020-05-19 | Stop reason: SDUPTHER
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
## 2020-02-11 ENCOUNTER — HOSPITAL ENCOUNTER (OUTPATIENT)
Dept: SLEEP CENTER | Age: 30
Discharge: HOME | End: 2020-02-13
Payer: COMMERCIAL

## 2020-02-11 PROCEDURE — 95810 POLYSOM 6/> YRS 4/> PARAM: CPT

## 2020-02-11 PROCEDURE — 95810 POLYSOM 6/> YRS 4/> PARAM: CPT | Performed by: PSYCHIATRY & NEUROLOGY

## 2020-02-12 VITALS
HEART RATE: 68 BPM | RESPIRATION RATE: 14 BRPM | DIASTOLIC BLOOD PRESSURE: 83 MMHG | SYSTOLIC BLOOD PRESSURE: 125 MMHG | HEIGHT: 71 IN | WEIGHT: 229 LBS | OXYGEN SATURATION: 98 % | BODY MASS INDEX: 32.06 KG/M2

## 2020-02-12 ASSESSMENT — SLEEP AND FATIGUE QUESTIONNAIRES
HOW LIKELY ARE YOU TO NOD OFF OR FALL ASLEEP IN A CAR, WHILE STOPPED FOR A FEW MINUTES IN TRAFFIC: 0
HOW LIKELY ARE YOU TO NOD OFF OR FALL ASLEEP WHILE SITTING QUIETLY AFTER LUNCH WITHOUT ALCOHOL: 1
HOW LIKELY ARE YOU TO NOD OFF OR FALL ASLEEP WHILE SITTING AND READING: 0
ESS TOTAL SCORE: 3
HOW LIKELY ARE YOU TO NOD OFF OR FALL ASLEEP WHILE SITTING INACTIVE IN A PUBLIC PLACE: 0
HOW LIKELY ARE YOU TO NOD OFF OR FALL ASLEEP WHILE LYING DOWN TO REST IN THE AFTERNOON WHEN CIRCUMSTANCES PERMIT: 1
HOW LIKELY ARE YOU TO NOD OFF OR FALL ASLEEP WHILE WATCHING TV: 1
HOW LIKELY ARE YOU TO NOD OFF OR FALL ASLEEP WHEN YOU ARE A PASSENGER IN A CAR FOR AN HOUR WITHOUT A BREAK: 0
HOW LIKELY ARE YOU TO NOD OFF OR FALL ASLEEP WHILE SITTING AND TALKING TO SOMEONE: 0

## 2020-02-19 LAB — STATUS: NORMAL

## 2020-02-23 ENCOUNTER — HOSPITAL ENCOUNTER (EMERGENCY)
Age: 30
Discharge: HOME | End: 2020-02-23
Attending: EMERGENCY MEDICINE
Payer: COMMERCIAL

## 2020-02-23 VITALS
OXYGEN SATURATION: 97 % | HEART RATE: 77 BPM | RESPIRATION RATE: 16 BRPM | WEIGHT: 213 LBS | SYSTOLIC BLOOD PRESSURE: 122 MMHG | HEIGHT: 71 IN | BODY MASS INDEX: 29.82 KG/M2 | TEMPERATURE: 98.2 F | DIASTOLIC BLOOD PRESSURE: 76 MMHG

## 2020-02-23 LAB
DIRECT EXAM: NORMAL
DIRECT EXAM: NORMAL
Lab: NORMAL
Lab: NORMAL
SPECIMEN DESCRIPTION: NORMAL
SPECIMEN DESCRIPTION: NORMAL

## 2020-02-23 PROCEDURE — 87880 STREP A ASSAY W/OPTIC: CPT

## 2020-02-23 PROCEDURE — 99283 EMERGENCY DEPT VISIT LOW MDM: CPT

## 2020-02-23 PROCEDURE — 96372 THER/PROPH/DIAG INJ SC/IM: CPT

## 2020-02-23 PROCEDURE — 6360000002 HC RX W HCPCS: Performed by: EMERGENCY MEDICINE

## 2020-02-23 PROCEDURE — 87804 INFLUENZA ASSAY W/OPTIC: CPT

## 2020-02-23 RX ORDER — DEXAMETHASONE SODIUM PHOSPHATE 4 MG/ML
4 INJECTION, SOLUTION INTRA-ARTICULAR; INTRALESIONAL; INTRAMUSCULAR; INTRAVENOUS; SOFT TISSUE ONCE
Status: COMPLETED | OUTPATIENT
Start: 2020-02-23 | End: 2020-02-23

## 2020-02-23 RX ADMIN — DEXAMETHASONE SODIUM PHOSPHATE 4 MG: 4 INJECTION, SOLUTION INTRA-ARTICULAR; INTRALESIONAL; INTRAMUSCULAR; INTRAVENOUS; SOFT TISSUE at 09:11

## 2020-02-23 ASSESSMENT — ENCOUNTER SYMPTOMS
VOMITING: 0
RHINORRHEA: 1
TROUBLE SWALLOWING: 0
SORE THROAT: 1
NAUSEA: 0
COUGH: 1
ABDOMINAL PAIN: 0

## 2020-02-23 ASSESSMENT — PAIN SCALES - GENERAL: PAINLEVEL_OUTOF10: 8

## 2020-02-23 NOTE — ED PROVIDER NOTES
[address] ED  eMERGENCY dEPARTMENT eNCOUnter      Pt Name: Francis Zambrano  MRN: [id number]  [place] 1990  Date of evaluation: 2/23/20      CHIEF COMPLAINT       Chief Complaint   Patient presents with    Pharyngitis    Nasal Congestion     HISTORY OF PRESENT ILLNESS   HPI 30 y.o. male comes the emergency department for evaluation of sore throat congestion cough. Symptoms been present for the last day. Pain rated as 8 out of 10. No fevers. No known sick contacts. No travel. He has been using some Plascencia's throat lozenges with minimal relief. REVIEW OF SYSTEMS     Review of Systems   Constitutional: Negative for fever. HENT: Positive for congestion, rhinorrhea and sore throat. Negative for trouble swallowing. Eyes: Negative for visual disturbance. Respiratory: Positive for cough. Cardiovascular: Negative for chest pain. Gastrointestinal: Negative for abdominal pain, nausea and vomiting. Genitourinary: Negative for hematuria. Skin: Negative for rash. Neurological: Negative for headaches. PAST MEDICAL HISTORY     Past Medical History:   Diagnosis Date    Anxiety     Headache        SURGICAL HISTORY     History reviewed. No pertinent surgical history. CURRENT MEDICATIONS       Previous Medications    BUTALBITAL-ACETAMINOPHEN-CAFFEINE (FIORICET, ESGIC) -40 MG PER TABLET    Take 1 tablet by mouth every 4 hours as needed for Headaches    CALCIUM-MAGNESIUM-VITAMIN D (CALCIUM 500 PO)    Take by mouth    FLUOXETINE (PROZAC) 40 MG CAPSULE    Take 1 capsule by mouth daily    MULTIPLE VITAMINS-MINERALS (MULTIVITAMIN ADULT PO)    Take by mouth    NAPROXEN (NAPROSYN) 500 MG TABLET    TAKE 1 TABLET BY MOUTH TWICE DAILY    POTASSIUM AMINOBENZOATE PO    Take by mouth    TOPIRAMATE (TOPAMAX) 25 MG TABLET    Take 2 tablets by mouth 2 times daily       ALLERGIES     is allergic to penicillins. FAMILY HISTORY     He indicated that the status of his father is unknown.  He 5' 11\" (1.803 m)       The patient was given the following medications while in the emergency department:  Orders Placed This Encounter   Medications    dexamethasone (DECADRON) injection 4 mg    Benzocaine-Menthol (SORE THROAT LOZENGES) 6-10 MG LOZG lozenge     Sig: Take 1 lozenge by mouth every 2 hours as needed for Sore Throat     Dispense:  18 lozenge     Refill:  0     -------------------------  CRITICAL CARE:   CONSULTS: None  PROCEDURES: Procedures     FINAL IMPRESSION      1.  Viral upper respiratory tract infection          DISPOSITION/PLAN   DISPOSITION Decision To Discharge 02/23/2020 09:00:42 AM      PATIENT REFERRED TO:  Veronica Davis, [address] [place], [place] [id number] [phone]    In 3 days      [facility] [place] [address] [phone]    If symptoms worsen      DISCHARGE MEDICATIONS:  New Prescriptions    BENZOCAINE-MENTHOL (SORE THROAT LOZENGES) 6-10 MG LOZG LOZENGE    Take 1 lozenge by mouth every 2 hours as needed for Sore Throat         Amberly Llanes MD  Attending Emergency Physician                      Amberly Llanes MD  02/23/20 [id number]

## 2020-03-10 ENCOUNTER — TELEPHONE (OUTPATIENT)
Dept: NEUROLOGY | Age: 30
End: 2020-03-10

## 2020-03-10 NOTE — TELEPHONE ENCOUNTER
----- Message from Autumn Gaspar sent at 2/18/2020 11:34 AM EST -----  Preliminary results of patient's in-lab sleep study from 2/11//2020 shows a MILD degree of Obstructive Sleep Apnea, with an Apnea Hypopnea Index of 5.6 events per hour. Please submit into EPIC an order for CPAP titration study (ORDER: SLE1) so that we may proceed to contact patient and schedule this treatment portion of the study. Thank you and have a great week.     Autumn Gaspar

## 2020-03-16 ENCOUNTER — HOSPITAL ENCOUNTER (OUTPATIENT)
Dept: SLEEP CENTER | Age: 30
Discharge: HOME | End: 2020-03-18
Payer: COMMERCIAL

## 2020-03-16 PROCEDURE — 95811 POLYSOM 6/>YRS CPAP 4/> PARM: CPT

## 2020-03-16 PROCEDURE — 95811 POLYSOM 6/>YRS CPAP 4/> PARM: CPT | Performed by: PSYCHIATRY & NEUROLOGY

## 2020-03-17 VITALS
WEIGHT: 229 LBS | BODY MASS INDEX: 32.06 KG/M2 | HEIGHT: 71 IN | HEART RATE: 55 BPM | RESPIRATION RATE: 14 BRPM | OXYGEN SATURATION: 97 %

## 2020-03-21 LAB — STATUS: NORMAL

## 2020-05-15 ENCOUNTER — HOSPITAL ENCOUNTER (EMERGENCY)
Age: 30
Discharge: HOME | End: 2020-05-15
Attending: EMERGENCY MEDICINE
Payer: COMMERCIAL

## 2020-05-15 ENCOUNTER — APPOINTMENT (OUTPATIENT)
Dept: GENERAL RADIOLOGY | Age: 30
End: 2020-05-15
Payer: COMMERCIAL

## 2020-05-15 VITALS
SYSTOLIC BLOOD PRESSURE: 123 MMHG | OXYGEN SATURATION: 98 % | RESPIRATION RATE: 18 BRPM | HEIGHT: 70 IN | BODY MASS INDEX: 32.21 KG/M2 | WEIGHT: 225 LBS | TEMPERATURE: 98.3 F | DIASTOLIC BLOOD PRESSURE: 62 MMHG | HEART RATE: 67 BPM

## 2020-05-15 PROCEDURE — 99284 EMERGENCY DEPT VISIT MOD MDM: CPT

## 2020-05-15 PROCEDURE — 71045 X-RAY EXAM CHEST 1 VIEW: CPT

## 2020-05-15 ASSESSMENT — ENCOUNTER SYMPTOMS
ABDOMINAL PAIN: 0
BACK PAIN: 0
SHORTNESS OF BREATH: 1
COUGH: 1
GASTROINTESTINAL NEGATIVE: 1
EYES NEGATIVE: 1

## 2020-05-15 ASSESSMENT — PAIN DESCRIPTION - PAIN TYPE: TYPE: ACUTE PAIN

## 2020-05-15 ASSESSMENT — PAIN DESCRIPTION - LOCATION: LOCATION: CHEST

## 2020-05-15 NOTE — ED PROVIDER NOTES
EMERGENCY DEPARTMENT ENCOUNTER    Pt Name: Carlton Felipe  MRN: [id number]  [place] 1990  Date of evaluation: 5/15/20  CHIEF COMPLAINT       Chief Complaint   Patient presents with    Shortness of Breath     X 2 DAY    Cough     X 2 DAY    Chest Pain     HISTORY OF PRESENT ILLNESS   The pt presents for evaluation of cough, congestion, chest pain, shortness of breath. Ongoing for 3-4 days. Gradual onset, gradually worsening. Pt does have family members that work at [facility] with likely positive covid exposures. No fever. Previously healthy. The history is provided by the patient. Cough   Cough characteristics:  Non-productive  Sputum characteristics:  Nondescript  Severity:  Moderate  Onset quality:  Gradual  Duration:  4 days  Timing:  Constant  Progression:  Worsening  Chronicity:  New  Relieved by:  Nothing  Worsened by:  Nothing  Associated symptoms: chest pain and shortness of breath    Associated symptoms: no chills, no fever, no headaches and no rash        REVIEW OF SYSTEMS     Review of Systems   Constitutional: Negative. Negative for chills and fever. HENT: Negative. Negative for congestion. Eyes: Negative. Respiratory: Positive for cough and shortness of breath. Cardiovascular: Positive for chest pain. Gastrointestinal: Negative. Negative for abdominal pain. Genitourinary: Negative. Musculoskeletal: Negative. Negative for back pain. Skin: Negative. Negative for rash. Neurological: Negative. Negative for headaches. All other systems reviewed and are negative. PASTMEDICAL HISTORY     Past Medical History:   Diagnosis Date    Anxiety     Headache      Past Problem List  Patient Active Problem List   Diagnosis Code    Facial injury S09. 80XA    Former smoker Z87.891    Panic attacks F41.0    Blurred vision H53.8    Episodic altered awareness R40.4    Intractable migraine without aura and without status migrainosus G43.019    Excessive underlying malignancy, immunocompromised, Age > 60 years. I do not feel the patient has an emergent medical condition at this time. At this time there is a critical shortage of SARS-Coronavirus-2 PCR testing, very limited criteria for testing, and we are unable to test the patient at this time since criteria is not met. Direct patient contact is avoided at this time due to the critically low supplies of PPE worldwide and an effort to becca appropriate use of PPE. I performed a medical screening exam that is compliant with the Declaration of [facility] in the [facility], secondary to the Pandemic Infectious Disease of SARS-Coronavirus-2. We are not testing for influenza or other viral etiologies at this time due to the significant evidence of virus coinfections during this pandemic. The patient is referred to appropriate outpatient follow up through their PCP or [facility]. I discussed with the patient home isolation measures, anticipatory guidance, discharge instructions, and to return immediately for worsening of symptoms. The patient is agreeable with this plan. CRITICAL CARE:       PROCEDURES:    Procedures    DIAGNOSTIC RESULTS   EKG:All EKG's are interpreted by the Emergency Department Physician who either signs or Co-signs this chart in the absence of a cardiologist.        RADIOLOGY:All plain film, CT, MRI, and formal ultrasound images (except ED bedside ultrasound) are read by the radiologist, see reports below, unless otherwisenoted in MDM or here. XR CHEST PORTABLE   Final Result   No acute process. LABS: All lab results were reviewed by myself, and all abnormals are listed below.   Labs Reviewed - No data to display    EMERGENCY DEPARTMENTCOURSE:         Vitals:    Vitals:    05/15/20 0959 05/15/20 1048   BP: 123/62    Pulse: 67    Resp: 18    Temp: 98.3 °F (36.8 °C)    TempSrc: Oral    SpO2:  98%   Weight: 225 lb (102.1 kg)    Height: 5' 10\" (1.778 m)        The patient was given the following medications while in the emergency department:  No orders of the defined types were placed in this encounter. CONSULTS:  None    FINAL IMPRESSION      1. Cough    2.  Viral URI          DISPOSITION/PLAN   DISPOSITION Decision To Discharge 05/15/2020 10:32:37 AM      PATIENT REFERRED TO:  Hai Palacios MD   [address] [place], [place] [phone]    Call in 1 day      DISCHARGE MEDICATIONS:  Discharge Medication List as of 5/15/2020 10:40 AM        Eddie Valencia MD  Attending Emergency Physician                    Jimmie Sorensen MD  05/15/20 4543

## 2020-05-16 ENCOUNTER — CARE COORDINATION (OUTPATIENT)
Dept: CARE COORDINATION | Age: 30
End: 2020-05-16

## 2020-05-18 ENCOUNTER — VIRTUAL VISIT (OUTPATIENT)
Dept: INTERNAL MEDICINE CLINIC | Age: 30
End: 2020-05-18
Payer: COMMERCIAL

## 2020-05-18 VITALS
BODY MASS INDEX: 31.92 KG/M2 | HEIGHT: 70 IN | DIASTOLIC BLOOD PRESSURE: 81 MMHG | SYSTOLIC BLOOD PRESSURE: 118 MMHG | WEIGHT: 223 LBS

## 2020-05-18 PROCEDURE — 99442 PR PHYS/QHP TELEPHONE EVALUATION 11-20 MIN: CPT | Performed by: PHYSICIAN ASSISTANT

## 2020-05-18 RX ORDER — ACETAMINOPHEN 500 MG
1000 TABLET ORAL 3 TIMES DAILY PRN
Qty: 180 TABLET | Refills: 0 | Status: SHIPPED | OUTPATIENT
Start: 2020-05-18 | End: 2021-03-18 | Stop reason: ALTCHOICE

## 2020-05-18 RX ORDER — CYCLOBENZAPRINE HCL 10 MG
10 TABLET ORAL NIGHTLY PRN
Qty: 10 TABLET | Refills: 0 | Status: SHIPPED | OUTPATIENT
Start: 2020-05-18 | End: 2020-05-28

## 2020-05-18 RX ORDER — KETOROLAC TROMETHAMINE 30 MG/ML
2 INJECTION, SOLUTION INTRAMUSCULAR; INTRAVENOUS
COMMUNITY
End: 2021-03-18 | Stop reason: ALTCHOICE

## 2020-05-18 ASSESSMENT — PATIENT HEALTH QUESTIONNAIRE - PHQ9
SUM OF ALL RESPONSES TO PHQ9 QUESTIONS 1 & 2: 0
2. FEELING DOWN, DEPRESSED OR HOPELESS: 0
SUM OF ALL RESPONSES TO PHQ QUESTIONS 1-9: 0
SUM OF ALL RESPONSES TO PHQ QUESTIONS 1-9: 0
1. LITTLE INTEREST OR PLEASURE IN DOING THINGS: 0

## 2020-05-18 NOTE — PROGRESS NOTES
The patient and/or health care decision maker are aware that the patient may receive a bill for this telephone service, depending on her/his insurance coverage, and provided verbal consent to proceed: Yes    Patient presents via telephone for emergency room follow up. Patient seen in ER 5/15/20 due to cough, chest pain, dyspnea. Was concern for possible exposure to Covid. Per report, patient clinically appeared well, non toxic, saturating at 98% on room air, temp 98.3 F, pulse 67. Patient reports shortness of breath has improved, still has mild dry cough. Complains of pain mid back, was walking out to garage and reached for something feeling sudden \"twinge\" in back. Pain is worse with activity, predominantly with twisting. No weakness/numbness in legs, no incontinence of bowels or bladder. Patient denies chest pain, fever/chills, worsening shortness of breath. No pain/swelling in the legs. Was reportedly tested for Covid-19 this morning at outside facility, result pending. Plan to keep patient off work until results available, remain in quarantine, avoid interactions with others. Will treat symptoms with Tylenol as needed, add Flexeril and heating pad for likely muscle strain/spasm. Continue supportive care, rest, hydration, return to emergency room for new or worsening symptoms. Patient understands and agrees with plan. Galina Joyce is a 30 y.o. male being evaluated by a Virtual Visit (video visit) encounter to address concerns as mentioned above. A caregiver was present when appropriate. Due to this being a TeleHealth encounter (During [facility]-16 public health emergency), evaluation of the following organ systems was limited: Vitals/Constitutional/EENT/Resp/CV/GI//MS/Neuro/Skin/Heme-Lymph-Imm.   Pursuant to the emergency declaration under the [facility], [address] [place] [address] authority and the [employer] Act, this

## 2020-05-19 RX ORDER — TOPIRAMATE 25 MG/1
50 TABLET ORAL 2 TIMES DAILY
Qty: 120 TABLET | Refills: 0 | Status: SHIPPED | OUTPATIENT
Start: 2020-05-19 | End: 2020-06-04 | Stop reason: SDUPTHER

## 2020-05-19 RX ORDER — FLUOXETINE HYDROCHLORIDE 40 MG/1
40 CAPSULE ORAL DAILY
Qty: 30 CAPSULE | Refills: 0 | Status: SHIPPED | OUTPATIENT
Start: 2020-05-19 | End: 2020-06-04 | Stop reason: SDUPTHER

## 2020-05-19 NOTE — TELEPHONE ENCOUNTER
Penny Renner is calling for a refill of Prozac, and Topamax. Medication active on med list yes      Date last ordered: 1/16/2020 and 12/5/2019  verified on 5/19/2020  verified by ABDELRAHMAN Dixon LPN      Date of last appointment 1/16/2020    Next Visit Date:  6/4/2020      Pt notified response time for refills is 24-48 hours

## 2020-05-20 ENCOUNTER — CARE COORDINATION (OUTPATIENT)
Dept: CARE COORDINATION | Age: 30
End: 2020-05-20

## 2020-05-20 NOTE — CARE COORDINATION
Patient contacted regarding COVID-19 risk and screening. This author contacted the patient by telephone to perform follow-up call. Verified name and  with patient as identifiers. Symptoms reviewed with patient. Patient reports symptoms are improving. Due to no new or worsening symptoms the RN CTN/ACM was not notified for escalation. This author reviewed discharge instructions, medical action plan and red flags such as increased shortness of breath, increasing fever, worsening cough or chest pain with patient who verbalized understanding. Discussed exposure protocols and quarantine with CDC Guidelines What To Do If You Are Sick    Patient who was given an opportunity for questions and concerns. The patient agrees to contact the [facility] exposure line [phone], local [facility] department [place] Department of Health: ([phone])[facility] PCP office for questions related to their healthcare. Author provided contact information for future reference.

## 2020-05-21 ENCOUNTER — TELEPHONE (OUTPATIENT)
Dept: INTERNAL MEDICINE CLINIC | Age: 30
End: 2020-05-21

## 2020-05-21 NOTE — TELEPHONE ENCOUNTER
Patients employer approved him off thru yesterday. I spoke to the [facility] regarding obtaining the [address] results from Monday, 05/18/20. They state the results will be in after 3:00 O'clock today. They also stated he would personally have to go to the clinic to pick these results up and they could not fax them to us. The patient was told NOT to leave his house until results are obtained. He needs a note to extend his off work for his employer until we can get these results somehow. [id number] [address] Jacome for a work note until Monday at least until you see what the results are and then go from there or please advise.

## 2020-05-22 NOTE — TELEPHONE ENCOUNTER
Patient called back and states he received his COVID19 results and they were negative. He will get us a copy.

## 2020-05-29 ENCOUNTER — CARE COORDINATION (OUTPATIENT)
Dept: CARE COORDINATION | Age: 30
End: 2020-05-29

## 2020-06-04 ENCOUNTER — OFFICE VISIT (OUTPATIENT)
Dept: NEUROLOGY | Age: 30
End: 2020-06-04
Payer: COMMERCIAL

## 2020-06-04 VITALS
WEIGHT: 230.4 LBS | HEART RATE: 76 BPM | TEMPERATURE: 97.6 F | DIASTOLIC BLOOD PRESSURE: 80 MMHG | BODY MASS INDEX: 32.26 KG/M2 | SYSTOLIC BLOOD PRESSURE: 123 MMHG | HEIGHT: 71 IN

## 2020-06-04 PROCEDURE — G8427 DOCREV CUR MEDS BY ELIG CLIN: HCPCS | Performed by: NURSE PRACTITIONER

## 2020-06-04 PROCEDURE — 1036F TOBACCO NON-USER: CPT | Performed by: NURSE PRACTITIONER

## 2020-06-04 PROCEDURE — G8417 CALC BMI ABV UP PARAM F/U: HCPCS | Performed by: NURSE PRACTITIONER

## 2020-06-04 PROCEDURE — 99214 OFFICE O/P EST MOD 30 MIN: CPT | Performed by: NURSE PRACTITIONER

## 2020-06-04 RX ORDER — FLUOXETINE HYDROCHLORIDE 40 MG/1
40 CAPSULE ORAL DAILY
Qty: 30 CAPSULE | Refills: 11 | Status: SHIPPED | OUTPATIENT
Start: 2020-06-04 | End: 2020-12-08 | Stop reason: SDUPTHER

## 2020-06-04 RX ORDER — BUTALBITAL, ACETAMINOPHEN AND CAFFEINE 50; 325; 40 MG/1; MG/1; MG/1
1 TABLET ORAL 2 TIMES DAILY PRN
Qty: 60 TABLET | Refills: 11 | Status: SHIPPED | OUTPATIENT
Start: 2020-06-04 | End: 2021-06-14

## 2020-06-04 RX ORDER — TOPIRAMATE 25 MG/1
50 TABLET ORAL 2 TIMES DAILY
Qty: 120 TABLET | Refills: 11 | Status: SHIPPED | OUTPATIENT
Start: 2020-06-04 | End: 2021-06-08

## 2020-06-04 RX ORDER — SUMATRIPTAN 100 MG/1
100 TABLET, FILM COATED ORAL
Qty: 9 TABLET | Refills: 5 | Status: SHIPPED | OUTPATIENT
Start: 2020-06-04 | End: 2022-03-07 | Stop reason: ALTCHOICE

## 2020-06-04 NOTE — PROGRESS NOTES
ALLERGIES:     Allergies   Allergen Reactions    Penicillins      All cillins                                 REVIEW OF SYSTEMS       All items selected indicate a positive finding. Those items not selected are negative. Constitutional [] Weight loss/gain   [x] Fatigue  [] Fever/Chills   HEENT [] Hearing Loss  [] Visual Disturbance  [] Tinnitus  [] Eye pain   Respiratory [] Shortness of Breath  [] Cough  [] Snoring   Cardiovascular [] Chest Pain  [] Palpitations  [] Lightheaded   GI [] Constipation  [] Diarrhea  [] Swallowing change    [] Urinary Frequency  [] Urinary Urgency   Musculoskeletal [] Neck pain  [] Back pain  [] Muscle pain  [] Restless legs   Dermatologic [] Skin changes   Neurologic [] Memory loss/confusion  [] Seizures  [] Trouble walking or imbalance  [] Dizziness  [] Weakness  [] Numbness  [] Tremors  [] Speech Difficulty  [x] Headaches  [x] Light Sensitivity  [] Sound Sensitivity   Endocrinology []Excessive thirst  []Excessive hunger   Psychiatric [] Anxiety/Depression  [] Hallucination   Allergy/immunology []Hives/environmental allergies   Hematologic/lymph [] Abnormal bleeding  [] Abnormal bruising     PHYSICAL EXAMINATION:       Vitals:    06/04/20 1440   BP: 123/80   Pulse: 76   Temp: 97.6 °F (36.4 °C)                                              .                                                                                                     General Appearance:  Alert, cooperative, no signs of distress, appears stated age   Head:  Normocephalic, no signs of trauma   Eyes:  Conjunctiva/corneas clear;  eyelids intact   Ears:  Normal external ear and canals   Nose: Nares normal, mucosa normal, no drainage    Throat: Lips and tongue normal; teeth normal;  gums normal   Neck: Supple, intact flexion, extension and rotation;   trachea midline;  no adenopathy;   thyroid: not enlarged;   no carotid pulse abnormality   Back:   Symmetric, no curvature, ROM adequate   Lungs:   Respirations unlabored   Heart:  Regular rate and rhythm           Extremities: Extremities normal, no cyanosis, no edema   Pulses: Symmetric over head and neck   Skin: Skin color, texture normal, no rashes, no lesions                                     NEUROLOGIC EXAMINATION    Neurologic Exam  Mental status    Alert and oriented x 3; intact memory with no confusion, speech or language problems; no hallucinations or delusions  Fund of information appropriate for level of education    Cranial nerves    II - visual fields intact to confrontation bilaterally  III, IV, VI - extra-ocular muscles full: no pupillary defect; no LEONIDAS, no nystagmus, no ptosis   V - normal facial sensation                                                               VII - normal facial symmetry                                                             VIII - intact hearing                                                                             IX, X - symmetrical palate                                                                  XI - symmetrical shoulder shrug                                                       XII - tongue midline without atrophy or fasciculation      Motor function  Normal muscle bulk and tone; strength 5/5 on all 4 extremities, no pronator drift      Sensory function Intact to light touch, pinprick, vibration, proprioception on all 4 extremities      Cerebellar Intact fine motor movement. No involuntary movements or tremors. No ataxia or dysmetria on finger to nose or heel to shin testing      Reflex function DTR 2+ on bilateral UE and LE, symmetric. Negative Babinski      Gait                   normal base and arm swing                  ASSESSMENT AND PLAN:           In summary, your patient, Carlos Lopez exhibits the following, with associated plan:    1. Episodes of altered awareness which are resolved. The results were likely secondary to panic attacks associated with anxiety.   1. Continue Prozac 40 mg daily  2. Migraine headache/tension type headaches improved with the initiation of Topamax  1. Continue Topamax 50 mg twice daily  2. Add Imitrex 100 mg for headache abortive therapy  3. Continue Fioricet as rescue  3. Obstructive sleep apnea which is mild. He is using CPAP at 6 to 10 cm of water without any improvement in his excessive daytime sleepiness.   1. Follow-up with the appointment with Dr. Calixto Webb who is a sleep specialist            Signed: Bertha Tinoco CNP      *Please note that portions of this note were completed with a voice recognition program.  Although every effort was made to insure the accuracy of this automated transcription, some errors in transcription may have occurred, occasionally words and are mis-transcribed

## 2020-09-15 ENCOUNTER — HOSPITAL ENCOUNTER (EMERGENCY)
Age: 30
Discharge: HOME | End: 2020-09-15
Attending: EMERGENCY MEDICINE
Payer: COMMERCIAL

## 2020-09-15 VITALS
RESPIRATION RATE: 20 BRPM | OXYGEN SATURATION: 97 % | HEART RATE: 96 BPM | TEMPERATURE: 98.5 F | SYSTOLIC BLOOD PRESSURE: 123 MMHG | DIASTOLIC BLOOD PRESSURE: 80 MMHG

## 2020-09-15 PROCEDURE — 99282 EMERGENCY DEPT VISIT SF MDM: CPT

## 2020-09-15 RX ORDER — BENZONATATE 100 MG/1
100 CAPSULE ORAL 2 TIMES DAILY PRN
Qty: 30 CAPSULE | Refills: 0 | Status: SHIPPED | OUTPATIENT
Start: 2020-09-15 | End: 2020-09-30

## 2020-09-15 RX ORDER — ONDANSETRON 4 MG/1
4 TABLET, ORALLY DISINTEGRATING ORAL EVERY 8 HOURS PRN
Qty: 20 TABLET | Refills: 0 | Status: SHIPPED | OUTPATIENT
Start: 2020-09-15 | End: 2020-09-22

## 2020-09-15 RX ORDER — GUAIFENESIN AND DEXTROMETHORPHAN HYDROBROMIDE 600; 30 MG/1; MG/1
1 TABLET, EXTENDED RELEASE ORAL 3 TIMES DAILY PRN
Qty: 30 TABLET | Refills: 0 | Status: SHIPPED | OUTPATIENT
Start: 2020-09-15 | End: 2020-09-25

## 2020-09-15 ASSESSMENT — ENCOUNTER SYMPTOMS
EYE REDNESS: 0
EYE PAIN: 0
SINUS PRESSURE: 0
COUGH: 1
NAUSEA: 0
CONSTIPATION: 0
EYE DISCHARGE: 0
BLOOD IN STOOL: 0
SHORTNESS OF BREATH: 0
BACK PAIN: 0
VOMITING: 0
ABDOMINAL PAIN: 0
TROUBLE SWALLOWING: 0
WHEEZING: 0
RHINORRHEA: 1
SORE THROAT: 1
COLOR CHANGE: 0
CHEST TIGHTNESS: 0
DIARRHEA: 0
FACIAL SWELLING: 0

## 2020-09-15 ASSESSMENT — PAIN SCALES - GENERAL: PAINLEVEL_OUTOF10: 6

## 2020-09-15 ASSESSMENT — PAIN DESCRIPTION - FREQUENCY: FREQUENCY: CONTINUOUS

## 2020-09-15 ASSESSMENT — PAIN DESCRIPTION - PAIN TYPE: TYPE: ACUTE PAIN

## 2020-09-15 ASSESSMENT — PAIN DESCRIPTION - LOCATION: LOCATION: GENERALIZED

## 2020-09-15 ASSESSMENT — PAIN DESCRIPTION - DESCRIPTORS: DESCRIPTORS: ACHING

## 2020-09-15 NOTE — ED PROVIDER NOTES
[address] ED  eMERGENCY dEPARTMENT eNCOUnter      Pt Name: Cesario Hess  MRN: [id number]  [place] 1990  Date of evaluation: 9/15/20      CHIEF COMPLAINT       Chief Complaint   Patient presents with    Pharyngitis    Nasal Congestion    Generalized Body Aches         HISTORY OF PRESENT ILLNESS    Cesario Hess is a 30 y.o. male who presents complaining of sore throat. Patient states for last 2 days he has been starting to feel ill. Patient had a severe sore throat last night with body aches nasal congestion rhinorrhea headaches. Patient states he is not really having vomiting or diarrhea. Patient has a little bit of a cough nonproductive with no shortness of breath. Patient had no recent ill contacts that he knows of. REVIEW OF SYSTEMS       Review of Systems   Constitutional: Positive for fever. Negative for activity change, appetite change, chills and diaphoresis. HENT: Positive for congestion, rhinorrhea and sore throat. Negative for ear pain, facial swelling, nosebleeds, sinus pressure and trouble swallowing. Eyes: Negative for pain, discharge and redness. Respiratory: Positive for cough. Negative for chest tightness, shortness of breath and wheezing. Cardiovascular: Negative for chest pain, palpitations and leg swelling. Gastrointestinal: Negative for abdominal pain, blood in stool, constipation, diarrhea, nausea and vomiting. Genitourinary: Negative for difficulty urinating, dysuria, flank pain, frequency, genital sores and hematuria. Musculoskeletal: Positive for myalgias. Negative for arthralgias, back pain, gait problem, joint swelling and neck pain. Skin: Negative for color change, pallor, rash and wound. Neurological: Positive for headaches. Negative for dizziness, tremors, seizures, syncope, speech difficulty, weakness and numbness.    Psychiatric/Behavioral: Negative for confusion, decreased concentration, hallucinations, self-injury, sleep disturbance and suicidal ideas. PAST MEDICAL HISTORY     Past Medical History:   Diagnosis Date    Anxiety     Headache        SURGICAL HISTORY     History reviewed. No pertinent surgical history. CURRENT MEDICATIONS       Previous Medications    ACETAMINOPHEN (TYLENOL) 500 MG TABLET    Take 2 tablets by mouth 3 times daily as needed for Pain    BUTALBITAL-ACETAMINOPHEN-CAFFEINE (FIORICET, ESGIC) -40 MG PER TABLET    Take 1 tablet by mouth 2 times daily as needed for Headaches    CALCIUM-MAGNESIUM-VITAMIN D (CALCIUM 500 PO)    Take by mouth    FLUOXETINE (PROZAC) 40 MG CAPSULE    Take 1 capsule by mouth daily    KETOROLAC (TORADOL) 60 MG/2ML INJECTION    2 mLs    MULTIPLE VITAMINS-MINERALS (MULTIVITAMIN ADULT PO)    Take by mouth    NAPROXEN (NAPROSYN) 500 MG TABLET    TAKE 1 TABLET BY MOUTH TWICE DAILY    POTASSIUM AMINOBENZOATE PO    Take by mouth    SUMATRIPTAN (IMITREX) 100 MG TABLET    Take 1 tablet by mouth once as needed for Migraine    TOPIRAMATE (TOPAMAX) 25 MG TABLET    Take 2 tablets by mouth 2 times daily       ALLERGIES     is allergic to penicillins. SOCIAL HISTORY      reports that he quit smoking about 3 years ago. His smoking use included cigarettes. He has a 10.00 pack-year smoking history. He has never used smokeless tobacco. He reports current alcohol use. He reports that he does not use drugs. PHYSICAL EXAM     INITIAL VITALS: /80   Pulse 96   Temp 98.5 °F (36.9 °C)   Resp 20   SpO2 97%      Physical Exam  Vitals signs and nursing note reviewed. Constitutional:       General: He is not in acute distress. Appearance: He is well-developed. He is not diaphoretic. HENT:      Head: Normocephalic and atraumatic. Eyes:      General: No scleral icterus. Right eye: No discharge. Left eye: No discharge. Conjunctiva/sclera: Conjunctivae normal.      Pupils: Pupils are equal, round, and reactive to light.    Cardiovascular:      Rate and Rhythm: Normal rate and regular rhythm. Heart sounds: Normal heart sounds. No murmur. No friction rub. No gallop. Pulmonary:      Effort: Pulmonary effort is normal. No respiratory distress. Breath sounds: Normal breath sounds. No wheezing or rales. Chest:      Chest wall: No tenderness. Abdominal:      General: Bowel sounds are normal. There is no distension. Palpations: Abdomen is soft. There is no mass. Tenderness: There is no abdominal tenderness. There is no guarding or rebound. Musculoskeletal: Normal range of motion. General: No tenderness. Skin:     General: Skin is warm and dry. Coloration: Skin is not pale. Findings: No erythema or rash. Neurological:      Mental Status: He is alert and oriented to person, place, and time. Cranial Nerves: No cranial nerve deficit. Sensory: No sensory deficit. Motor: No abnormal muscle tone. Coordination: Coordination normal.      Deep Tendon Reflexes: Reflexes normal.   Psychiatric:         Behavior: Behavior normal.         Thought Content: Thought content normal.         Judgment: Judgment normal.         DIAGNOSTIC RESULTS     RADIOLOGY:All plain film, CT,MRI, and formal ultrasound images (except ED bedside ultrasound) are read by the radiologist and the interpretations are directly viewed by the emergency physician. LABS: All lab results were reviewed by myself, and all abnormals are listed below. Labs Reviewed - No data to display      MEDICAL DECISION MAKING:     Patient symptoms are consistent with a viral infection and with the pandemic this could very well be COVID. Will treat him symptomatically and have him follow-up for testing.       EMERGENCY DEPARTMENT COURSE:   Vitals:    Vitals:    09/15/20 0815   BP: 123/80   Pulse: 96   Resp: 20   Temp: 98.5 °F (36.9 °C)   SpO2: 97%       The patient was given the following medications while in the emergency department:  Orders Placed This Encounter

## 2020-09-15 NOTE — ED NOTES
Walk in    Chief Complaint: Sore Throat, body aches    Patient reports having a sore throat yesterday with symptoms worsening throughout the night and today. Patient reports body aches and generalized malaise.        Neelam Dickey RN  09/15/20 1576

## 2020-12-08 ENCOUNTER — OFFICE VISIT (OUTPATIENT)
Dept: NEUROLOGY | Age: 30
End: 2020-12-08
Payer: COMMERCIAL

## 2020-12-08 VITALS
HEIGHT: 71 IN | BODY MASS INDEX: 32.76 KG/M2 | DIASTOLIC BLOOD PRESSURE: 82 MMHG | HEART RATE: 61 BPM | TEMPERATURE: 97.2 F | SYSTOLIC BLOOD PRESSURE: 125 MMHG | WEIGHT: 234 LBS

## 2020-12-08 PROCEDURE — G8417 CALC BMI ABV UP PARAM F/U: HCPCS | Performed by: NURSE PRACTITIONER

## 2020-12-08 PROCEDURE — 99214 OFFICE O/P EST MOD 30 MIN: CPT | Performed by: NURSE PRACTITIONER

## 2020-12-08 PROCEDURE — G8427 DOCREV CUR MEDS BY ELIG CLIN: HCPCS | Performed by: NURSE PRACTITIONER

## 2020-12-08 PROCEDURE — 1036F TOBACCO NON-USER: CPT | Performed by: NURSE PRACTITIONER

## 2020-12-08 PROCEDURE — G8484 FLU IMMUNIZE NO ADMIN: HCPCS | Performed by: NURSE PRACTITIONER

## 2020-12-08 RX ORDER — TRAZODONE HYDROCHLORIDE 50 MG/1
50 TABLET ORAL NIGHTLY PRN
Qty: 30 TABLET | Refills: 5 | Status: SHIPPED | OUTPATIENT
Start: 2020-12-08 | End: 2021-02-08 | Stop reason: SDUPTHER

## 2020-12-08 RX ORDER — FLUOXETINE HYDROCHLORIDE 40 MG/1
40 CAPSULE ORAL 2 TIMES DAILY
Qty: 60 CAPSULE | Refills: 11 | Status: SHIPPED | OUTPATIENT
Start: 2020-12-08 | End: 2020-12-08

## 2020-12-08 RX ORDER — FLUOXETINE HYDROCHLORIDE 40 MG/1
40 CAPSULE ORAL DAILY
Qty: 30 CAPSULE | Refills: 11 | Status: SHIPPED | OUTPATIENT
Start: 2020-12-08 | End: 2022-03-07 | Stop reason: ALTCHOICE
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
## 2021-02-08 ENCOUNTER — OFFICE VISIT (OUTPATIENT)
Dept: NEUROLOGY | Age: 31
End: 2021-02-08
Payer: COMMERCIAL

## 2021-02-08 VITALS
TEMPERATURE: 97.1 F | BODY MASS INDEX: 33.74 KG/M2 | DIASTOLIC BLOOD PRESSURE: 72 MMHG | HEIGHT: 71 IN | WEIGHT: 241 LBS | HEART RATE: 69 BPM | SYSTOLIC BLOOD PRESSURE: 113 MMHG

## 2021-02-08 DIAGNOSIS — G47.33 OSA (OBSTRUCTIVE SLEEP APNEA): ICD-10-CM

## 2021-02-08 DIAGNOSIS — G43.009 MIGRAINE WITHOUT AURA AND WITHOUT STATUS MIGRAINOSUS, NOT INTRACTABLE: ICD-10-CM

## 2021-02-08 DIAGNOSIS — G47.19 EXCESSIVE DAYTIME SLEEPINESS: Primary | ICD-10-CM

## 2021-02-08 DIAGNOSIS — F41.0 PANIC ATTACKS: ICD-10-CM

## 2021-02-08 PROCEDURE — 99214 OFFICE O/P EST MOD 30 MIN: CPT | Performed by: NURSE PRACTITIONER

## 2021-02-08 PROCEDURE — G8417 CALC BMI ABV UP PARAM F/U: HCPCS | Performed by: NURSE PRACTITIONER

## 2021-02-08 PROCEDURE — G8484 FLU IMMUNIZE NO ADMIN: HCPCS | Performed by: NURSE PRACTITIONER

## 2021-02-08 PROCEDURE — G8427 DOCREV CUR MEDS BY ELIG CLIN: HCPCS | Performed by: NURSE PRACTITIONER

## 2021-02-08 PROCEDURE — 1036F TOBACCO NON-USER: CPT | Performed by: NURSE PRACTITIONER

## 2021-02-08 RX ORDER — TRAZODONE HYDROCHLORIDE 50 MG/1
50 TABLET ORAL NIGHTLY PRN
Qty: 30 TABLET | Refills: 11 | Status: SHIPPED | OUTPATIENT
Start: 2021-02-08 | End: 2021-08-09 | Stop reason: SDUPTHER

## 2021-02-08 NOTE — PROGRESS NOTES
 Not on file       CURRENT MEDICATIONS:     Current Outpatient Medications   Medication Sig Dispense Refill    traZODone (DESYREL) 50 MG tablet Take 1 tablet by mouth nightly as needed for Sleep 30 tablet 11    FLUoxetine (PROZAC) 40 MG capsule Take 1 capsule by mouth daily 30 capsule 11    SUMAtriptan (IMITREX) 100 MG tablet Take 1 tablet by mouth once as needed for Migraine 9 tablet 5    butalbital-acetaminophen-caffeine (FIORICET, ESGIC) -40 MG per tablet Take 1 tablet by mouth 2 times daily as needed for Headaches 60 tablet 11    topiramate (TOPAMAX) 25 MG tablet Take 2 tablets by mouth 2 times daily 120 tablet 11    ketorolac (TORADOL) 60 MG/2ML injection 2 mLs      acetaminophen (TYLENOL) 500 MG tablet Take 2 tablets by mouth 3 times daily as needed for Pain (Patient not taking: Reported on 12/8/2020) 180 tablet 0    Multiple Vitamins-Minerals (MULTIVITAMIN ADULT PO) Take by mouth      Calcium-Magnesium-Vitamin D (CALCIUM 500 PO) Take by mouth      POTASSIUM AMINOBENZOATE PO Take by mouth      naproxen (NAPROSYN) 500 MG tablet TAKE 1 TABLET BY MOUTH TWICE DAILY  0     No current facility-administered medications for this visit. ALLERGIES:     Allergies   Allergen Reactions    Penicillins      All cillins                                 REVIEW OF SYSTEMS        All items selected indicate a positive finding. Those items not selected are negative.   Constitutional [x] Weight loss/gain   [x] Fatigue   [] Fever/Chills   HEENT [] Hearing Loss  [] Visual Disturbance  [] Tinnitus  [] Eye pain   Respiratory [] Shortness of Breath  [] Cough  [] Snoring   Cardiovascular [] Chest Pain  [] Palpitations  [] Lightheaded   GI [] Constipation  [] Diarrhea  [] Swallowing change  [] Nausea/vomiting    [] Urinary Frequency  [] Urinary Urgency   Musculoskeletal [] Neck pain  [] Back pain  [] Muscle pain  [] Restless legs   Dermatologic [] Skin changes   Neurologic [] Memory loss/confusion [] Seizures  [] Trouble walking or imbalance  [] Dizziness  [] Sleep disturbance  [] Weakness  [] Numbness  [] Tremors  [] Speech Difficulty  [x] Headaches  [] Light Sensitivity  [] Sound Sensitivity   Endocrinology []Excessive thirst  []Excessive hunger   Psychiatric [x] Anxiety/Depression  [] Hallucination   Allergy/immunology []Hives/environmental allergies   Hematologic/lymph [] Abnormal bleeding  [] Abnormal bruising         PHYSICAL EXAMINATION:       Vitals:    02/08/21 1444   BP: 113/72   Pulse: 69   Temp: 97.1 °F (36.2 °C)                                              .                                                                                                     General Appearance:  Alert, cooperative, no signs of distress, appears stated age   Head:  Normocephalic, no signs of trauma   Eyes:  Conjunctiva/corneas clear;  eyelids intact   Ears:  Normal external ear and canals   Nose: Nares normal, mucosa normal, no drainage    Throat: Lips and tongue normal; teeth normal;  gums normal   Neck: Supple, intact flexion, extension and rotation;   trachea midline;  no adenopathy;   thyroid: not enlarged;   no carotid pulse abnormality   Back:   Symmetric, no curvature, ROM adequate   Lungs:   Respirations unlabored   Heart:  Regular rate and rhythm           Extremities: Extremities normal, no cyanosis, no edema   Pulses: Symmetric over head and neck   Skin: Skin color, texture normal, no rashes, no lesions                                     NEUROLOGIC EXAMINATION    Neurologic Exam  Mental status    Alert and oriented x 3; intact memory with no confusion, speech or language problems; no hallucinations or delusions  Fund of information appropriate for level of education    Cranial nerves    II - visual fields intact to confrontation bilaterally  III, IV, VI  extra-ocular muscles full: no pupillary defect; no LEONIDAS, no nystagmus, no ptosis V - normal facial sensation                                                               VII - normal facial symmetry                                                             VIII - intact hearing                                                                             IX, X - symmetrical palate                                                                  XI - symmetrical shoulder shrug                                                       XII - tongue midline without atrophy or fasciculation      Motor function  Normal muscle bulk and tone; strength 5/5 on all 4 extremities, no pronator drift      Sensory function Intact to light touch, pinprick, vibration, proprioception on all 4 extremities      Cerebellar Intact fine motor movement. No involuntary movements or tremors. No ataxia or dysmetria on finger to nose or heel to shin testing      Reflex function DTR 2+ on bilateral UE and LE, symmetric. Negative Babinski      Gait                   normal base and arm swing                  Medical Decision Making: In summary, your patient, Fe Mandujano exhibits the following, with associated plan:    1. Episodes of altered awareness which are resolved.  The results were likely secondary to panic attacks associated with anxiety. 1. Continue Prozac 40 mg daily  2. Migraine headache/tension type headaches improved with the initiation of Topamax  1. Continue Topamax 50 mg twice daily  2. Continue Imitrex 100 mg for headache abortive therapy  3. Continue Fioricet as rescue  3. Obstructive sleep apnea which is mild.  He is not using CPAP as the trazodone has been getting him to sleep but notes that he still feels tired throughout the day. 1. Continue trazodone 50 mg at bedtime nightly  2. Recommended patient use CPAP  3.  Return for follow up visit in 6 months            Signed: Jaylyn Rivera CNP *Please note that portions of this note were completed with a voice recognition program.  Although every effort was made to insure the accuracy of this automated transcription, some errors in transcription may have occurred, occasionally words and are mis-transcribed    Provider Attestation:    *The documentation recorded by the scribe accurately reflects the service I personally performed and the decisions made by myself. Portions of this exam were transcribed by a scribe. I personally performed the history, physical exam, and the medical decision-making and confirm the accuracy of the information in the transcribed note. *      Scribe Attestation:     By signing my name below, I, Ranjit Parekh, attest that this documentation has been prepared under the direction and in the presence of Vaishali Monteiro CNP.

## 2021-02-19 ENCOUNTER — OFFICE VISIT (OUTPATIENT)
Dept: INTERNAL MEDICINE CLINIC | Age: 31
End: 2021-02-19
Payer: COMMERCIAL

## 2021-02-19 ENCOUNTER — HOSPITAL ENCOUNTER (OUTPATIENT)
Facility: CLINIC | Age: 31
Discharge: HOME | End: 2021-02-21
Payer: COMMERCIAL

## 2021-02-19 ENCOUNTER — HOSPITAL ENCOUNTER (OUTPATIENT)
Dept: GENERAL RADIOLOGY | Facility: CLINIC | Age: 31
Discharge: HOME | End: 2021-02-21
Payer: COMMERCIAL

## 2021-02-19 VITALS
BODY MASS INDEX: 33.74 KG/M2 | WEIGHT: 241 LBS | TEMPERATURE: 97.9 F | OXYGEN SATURATION: 98 % | SYSTOLIC BLOOD PRESSURE: 124 MMHG | HEART RATE: 71 BPM | HEIGHT: 71 IN | DIASTOLIC BLOOD PRESSURE: 64 MMHG

## 2021-02-19 DIAGNOSIS — M54.2 CERVICAL PAIN (NECK): ICD-10-CM

## 2021-02-19 DIAGNOSIS — M79.671 HEEL PAIN, BILATERAL: ICD-10-CM

## 2021-02-19 DIAGNOSIS — M54.50 LUMBAR BACK PAIN: ICD-10-CM

## 2021-02-19 DIAGNOSIS — M79.672 HEEL PAIN, BILATERAL: ICD-10-CM

## 2021-02-19 DIAGNOSIS — M54.50 LUMBAR BACK PAIN: Primary | ICD-10-CM

## 2021-02-19 PROCEDURE — 72040 X-RAY EXAM NECK SPINE 2-3 VW: CPT

## 2021-02-19 PROCEDURE — G8484 FLU IMMUNIZE NO ADMIN: HCPCS | Performed by: NURSE PRACTITIONER

## 2021-02-19 PROCEDURE — G8417 CALC BMI ABV UP PARAM F/U: HCPCS | Performed by: NURSE PRACTITIONER

## 2021-02-19 PROCEDURE — 72100 X-RAY EXAM L-S SPINE 2/3 VWS: CPT

## 2021-02-19 PROCEDURE — 1036F TOBACCO NON-USER: CPT | Performed by: NURSE PRACTITIONER

## 2021-02-19 PROCEDURE — G8427 DOCREV CUR MEDS BY ELIG CLIN: HCPCS | Performed by: NURSE PRACTITIONER

## 2021-02-19 PROCEDURE — 99213 OFFICE O/P EST LOW 20 MIN: CPT | Performed by: NURSE PRACTITIONER

## 2021-02-19 ASSESSMENT — ENCOUNTER SYMPTOMS
NAUSEA: 0
ABDOMINAL PAIN: 0
CONSTIPATION: 0
COUGH: 0
BLOOD IN STOOL: 0
SHORTNESS OF BREATH: 0
SORE THROAT: 0
BACK PAIN: 1
WHEEZING: 0
SINUS PAIN: 0
RHINORRHEA: 0
VOMITING: 0
CHEST TIGHTNESS: 0
DIARRHEA: 0
TROUBLE SWALLOWING: 0

## 2021-02-19 ASSESSMENT — PATIENT HEALTH QUESTIONNAIRE - PHQ9
SUM OF ALL RESPONSES TO PHQ QUESTIONS 1-9: 0
SUM OF ALL RESPONSES TO PHQ QUESTIONS 1-9: 0
2. FEELING DOWN, DEPRESSED OR HOPELESS: 0
SUM OF ALL RESPONSES TO PHQ9 QUESTIONS 1 & 2: 0
SUM OF ALL RESPONSES TO PHQ QUESTIONS 1-9: 0
1. LITTLE INTEREST OR PLEASURE IN DOING THINGS: 0

## 2021-02-19 NOTE — PROGRESS NOTES
Visit Information    Have you changed or started any medications since your last visit including any over-the-counter medicines, vitamins, or herbal medicines? no   Are you having any side effects from any of your medications? -  no  Have you stopped taking any of your medications? Is so, why? -  no    Have you seen any other physician or provider since your last visit? No  Have you had any other diagnostic tests since your last visit? No  Have you been seen in the emergency room and/or had an admission to a hospital since we last saw you? No  Have you had your routine dental cleaning in the past 6 months? no    Have you activated your [facility] account? If not, what are your barriers?  Yes     Patient Care Team:  Saranya Duff MD as PCP - General (Internal Medicine)  Saranya Duff MD as PCP - [facility][1]    Medical History Review  Past Medical, Family, and Social History reviewed and does contribute to the patient presenting condition    Health Maintenance   Topic Date Due    Varicella vaccine (1 of 2 - 2-dose childhood series) 10/07/1992    Hepatitis B vaccine (3 of 3 - 3-dose primary series) 09/10/2002    Flu vaccine (1) 09/01/2020    DTaP/Tdap/Td vaccine (2 - Td) 06/25/2024    Hepatitis C screen  Completed    HIV screen  Completed    Hepatitis A vaccine  Aged Out    Hib vaccine  Aged Out    Meningococcal (ACWY) vaccine  Aged Out    Pneumococcal 0-64 years Vaccine  Aged Out         [address] [address]  Dept: [phone]  Dept Fax: [phone]    OfficeProgress/Follow Up Note  Date of patient's visit: 2/19/2021  Patient's Name:  lJ Jennings YOB: 1990            Patient Care Team:  Saranya Duff MD as PCP - General (Internal Medicine)  Saranya Duff MD as PCP - [facility][1]    REASON FOR VISIT:Same day visit    HISTORY OF PRESENT ILLNESS:      Chief Complaint   Patient presents with    Foot Pain B heel pain    Lower Back Pain       History was obtained from the patient. Jl Jennings is a 35 y.o. male here today for     Diffuse lumbar back pain, denies radicular symptoms  Pain is chronic for a few years  Denies injury or trauma  Denies loss of bowel or bladder function  Weakness in legs feeling like legs are going to give out   Denies aggravating and alleviating factors      Bilateral heel pain x 5 months  Aggravating with walking, and lifting feet up  Patient has taken Advil 400 mg for pain, states pain was not relieved  Denies injury or trauma to the foot  Denies deficits in ROM of bilateral feet.      Patient Active Problem List   Diagnosis    Facial injury    Former smoker    Panic attacks    Blurred vision    Episodic altered awareness    Migraine without aura and without status migrainosus, not intractable    Excessive daytime sleepiness    ELIZABETH (obstructive sleep apnea)       MEDICATIONS:      Current Outpatient Medications   Medication Sig Dispense Refill    traZODone (DESYREL) 50 MG tablet Take 1 tablet by mouth nightly as needed for Sleep 30 tablet 11    FLUoxetine (PROZAC) 40 MG capsule Take 1 capsule by mouth daily 30 capsule 11    SUMAtriptan (IMITREX) 100 MG tablet Take 1 tablet by mouth once as needed for Migraine 9 tablet 5    butalbital-acetaminophen-caffeine (FIORICET, ESGIC) -40 MG per tablet Take 1 tablet by mouth 2 times daily as needed for Headaches 60 tablet 11    topiramate (TOPAMAX) 25 MG tablet Take 2 tablets by mouth 2 times daily 120 tablet 11    ketorolac (TORADOL) 60 MG/2ML injection 2 mLs      acetaminophen (TYLENOL) 500 MG tablet Take 2 tablets by mouth 3 times daily as needed for Pain (Patient not taking: Reported on 12/8/2020) 180 tablet 0    Multiple Vitamins-Minerals (MULTIVITAMIN ADULT PO) Take by mouth      Calcium-Magnesium-Vitamin D (CALCIUM 500 PO) Take by mouth      POTASSIUM AMINOBENZOATE PO Take by mouth  naproxen (NAPROSYN) 500 MG tablet TAKE 1 TABLET BY MOUTH TWICE DAILY  0     No current facility-administered medications for this visit. ALLERGIES:      Allergies   Allergen Reactions    Penicillins      All cillins       SOCIAL HISTORY   Reviewed and no change from previous record. Eric Goddard  reports that he quit smoking about 4 years ago. His smoking use included cigarettes. He has a 10.00 pack-year smoking history. He has never used smokeless tobacco.    FAMILY HISTORY:    Reviewed and No change from previous visit    REVIEW OF SYSTEMS:    Review of Systems   Constitutional: Negative for appetite change, diaphoresis, fatigue, fever and unexpected weight change. HENT: Negative for ear pain, postnasal drip, rhinorrhea, sinus pain, sore throat and trouble swallowing. Eyes: Negative for visual disturbance. Respiratory: Negative for cough, chest tightness, shortness of breath and wheezing. Cardiovascular: Negative for chest pain, palpitations and leg swelling. Gastrointestinal: Negative for abdominal pain, blood in stool, constipation, diarrhea, nausea and vomiting. Endocrine: Negative for polydipsia, polyphagia and polyuria. Genitourinary: Negative for difficulty urinating, dysuria and flank pain. Musculoskeletal: Positive for arthralgias (bilateral heel pain), back pain and neck pain. Negative for myalgias. Skin: Negative for rash and wound. Neurological: Negative for dizziness, syncope and weakness. All other systems reviewed and are negative. PHYSICAL EXAM:      Vitals:    02/19/21 1307   BP: 124/64   Pulse: 71   Temp: 97.9 °F (36.6 °C)   SpO2: 98%   Weight: 241 lb (109.3 kg)   Height: 5' 11\" (1.803 m)       Physical Exam  Vitals signs reviewed. Constitutional:       Appearance: Normal appearance. HENT:      Head: Normocephalic. Cardiovascular:      Rate and Rhythm: Normal rate and regular rhythm. Pulses: Normal pulses. Heart sounds: Normal heart sounds. Pulmonary:      Effort: Pulmonary effort is normal.      Breath sounds: Normal breath sounds. Abdominal:      General: Bowel sounds are normal.      Palpations: Abdomen is soft. Musculoskeletal:         General: No swelling or deformity. Cervical back: He exhibits tenderness and bony tenderness. He exhibits normal range of motion, no swelling, no edema, no deformity and no laceration. Lumbar back: He exhibits decreased range of motion, tenderness and bony tenderness. He exhibits no swelling, no edema and no deformity. Skin:     General: Skin is warm and dry. Neurological:      General: No focal deficit present. Mental Status: He is alert and oriented to person, place, and time. Psychiatric:         Mood and Affect: Mood normal.         Behavior: Behavior normal.         Thought Content: Thought content normal.         Judgment: Judgment normal.          ASSESSMENT AND PLAN:      1. Lumbar back pain  - XR LUMBAR SPINE (MIN 4 VIEWS); [facility]  -supportive care measures encouraged (RICE, NSAIDs for pain)  -lumbar back exercises also provided    2. Cervical pain (neck)  - XR CERVICAL SPINE 1 VW; [facility]  -supportive care measures encouraged (RICE, NSAIDs for pain)  -cervical neck exercises also provided    3. Heel pain, bilateral  -supportive care measures encouraged (RICE, NSAIDs for pain, wearing good supporting shoes)  -calf exercises provided   - [address] [place] [address] [id number], DP, Podiatry, [place] (if pain persists)      FOLLOW UP AND INSTRUCTIONS:   Return in about 4 weeks (around 3/19/2021) for f/u neck/back and foot pain. Discussed use, benefit, and side effects of prescribed medications. All patient questions answered. Patient voiced understanding.      Patient given educational materials - see patient instructions    LEANDRA Wood - RODRIGO MARTIN [facility]  2/19/2021, 1:53 PM Please note that this chart wasgenerated using voice recognition Dragon dictation software. Although every effort was made to ensure the accuracy of this automated transcription, some errors in transcription may have occurred.

## 2021-03-17 ENCOUNTER — HOSPITAL ENCOUNTER (OUTPATIENT)
Dept: PHYSICAL THERAPY | Age: 31
Setting detail: THERAPIES SERIES
Discharge: HOME | End: 2021-03-17
Payer: COMMERCIAL

## 2021-03-17 PROCEDURE — 97112 NEUROMUSCULAR REEDUCATION: CPT

## 2021-03-17 PROCEDURE — 97161 PT EVAL LOW COMPLEX 20 MIN: CPT

## 2021-03-17 PROCEDURE — 97110 THERAPEUTIC EXERCISES: CPT

## 2021-03-17 NOTE — PROGRESS NOTES
Independent  [] Assist    Driving [x] Independent  [] Assist [x] Independent  [] Assist (+)   Housekeeping [x] Independent  [] Assist [x] Independent  [] Assist    Grocery shop/meal prep [x] Independent  [] Assist [x] Independent  [] Assist (+)      Gait Prior level of function Current level of function    [x] Independent  [] Assist [x] Independent  [] Assist   Device: [x] Independent [x] Independent    [] Straight Cane [] Quad cane [] Straight Cane [] Quad cane    [] Standard walker [] Rolling walker   [] 4 wheeled walker [] Standard walker [] Rolling walker   [] 4 wheeled walker    [] Wheelchair [] Wheelchair     Pain:  [] Yes  [] No Location: low back  Pain Rating: (0-10 scale) 3/10  Pain altered Tx:  [x] Yes  [] No  Action:    Symptoms:  [] Improving [] Worsening [x] Same  Better:  [x] AM    [] PM    [x] Sit    [] Rise/Sit    []Stand    [] Walk    [x] Lying    [x] Other:medications, exercises, pain patches,   Worse: [] AM    [x] PM    [] Sit    [] Rise/Sit    [x]Stand    [x] Walk    [] Lying    [x] Bend                             [] Valsalva    [] Other:  Sleep: [] OK    [x] Disturbed    Objective:      STRENGTH  STRENGTH  ROM    Left Right  Left Right Cervical    C5 Shld Abd   L1-2 Hip Flex 5/5 5/5 Flexion    Shld Flexion   Hip Abd '' '' Extension    Shld IR   L3-4 Knee Ext '' '' Rotation L R   Shld ER   L4 Ankle DF '' '' Sidebend L R   C6 Elb Flex   L5 EHL '' '' Retraction    C7 Elb Ext   S1 Plant. Flex '' '' Lumbar    C8 EPL   Abdominals   Flexion 50 % loss (+)   T1 Fing Abd   Erector Spinae   Extension 50 % loss (+)         Rotation L 50% loss R 50% loss         Sidebend L 50% loss R 50 % loss         UE/LE                                                                  TESTS (+/-) LEFT RIGHT Not Tested   SLR [] sit [] supine (+)  []   Hamstring (SLR) (+)  []   SKTC   []   DKTC   []   Slump/Dural   []   SI JT   []   JOLENE   []   Joint Mobility   []   Cerv. Comp   []   Cerv. Distraction   []   Cerv. Alar/Transverse   []   Vertebral Artery   []   Adsons   []   Darnell Linear   []   Fili Tests ? Pain ? Pain No Change Not Tested   RFIS [x] [] [] []   JEREMY [x] [] [] []   RFIL [] [] [] []   REIL [] [] [] []   Rep Prot [x] [] [] []   Rep Retract [] [x] [] []       OBSERVATION No Deficit Deficit Not Tested Comments   Posture       Forward Head [] [x] []    Rounded Shoulders [] [x] []    Kyphosis [] [x] []    Lordosis [] [x] []    Lateral Shift [] [] []    Scoliosis [] [] []    Iliac Crest [] [] []    PSIS [] [] []    ASIS [] [] []    Genu Valgus [] [] []    Genu Varus [] [] []    Genu Recurvatum [] [] []    Pronation [] [] []    Supination [] [] []    Leg Length Discrp [] [] []    Slumped Sitting [] [] []    Palpation [x] [] []    Sensation [x] [] []    Edema [x] [] []    Neurological [x] [] []                FUNCTION Normal Difficult Unable   Sitting [] [x] []   Standing [] [x] []   Ambulation [] [x] []   Groom/Dress [] [x] []   Lift/Carry [] [x] []   Stairs [] [x] []   Bending [] [x] []   OH reach [] [x] []   Sit to Stand [] [x] []     Comments:    Assessment: Patient would continue to benefit from skilled physical therapy services in order to: Improve lumbar QROM and strength of low back. Improve   Problems:    [x] ? Back Pain:    [x] ? Cervical Pain:    [x] ? ROM:     [x] ? Strength:   [x] ? Function:  [x] Postural Deviations  [x] Gait Deviations   STG: (to be met in 6 treatments)  1. ? Pain: Lesson pain 2/10.  2. ? ROM:Increase standing lumbar extension to 75% of normal.   3. ? Strength: Increase lumbar strength to 2+/5.  4. ? Function:OPTIMAL score of 6/3.  5. Independent with Home Exercise Programs    LTG: (to be met in 12 treatments)  1. OpTIMAL score of 3/3 at discharge. 2. Return to work without difficulty. Patient goals: Work without pain.        Functional Assessment Used: OPTIMAL score 12/3  Current Status Score:12/3  Goal Status Sore: 4/3    Evaluation Complexity:  History (Personal factors, co morbidities) [x] 0 [] 1-2 [] 3+   Exam (limitations, restrictions) [x] 1-2 [] 3 [] 4+   Clinical presentation (progression) [x] Stable [] Evolving  [] Unstable   Decision Making [x] Low [] Moderate [] High    [x] Low Complexity [] Moderate Complexity [] High Complexity       Rehab Potential:  [x] Good  [] Fair  [] Poor   Suggested Professional Referral:  [x] No  [] Yes:  Barriers to Goal Achievement[de-identified]  [x] No  [] Yes:  Domestic Concerns:  [x] No  [] Yes:    Pt. Education:  [x] Plans/Goals, Risks/Benefits discussed  [x] Home exercise program  Method of Education: [x] Verbal  [x] Demo  [x] Written  Comprehension of Education:  [x] Verbalizes understanding. [] Demonstrates understanding. [] Needs Review. [x] Demonstrates/verbalizes understanding of HEP/Ed previously given.     Treatment Plan:  [x] Therapeutic Exercise   77710  [] Iontophoresis: 4 mg/mL Dexamethasone Sodium Phosphate  mAmin  87073   [] Therapeutic Activity  19803 [] Vasopneumatic cold with compression  23373    [] Gait Training   85016 [x] Ultrasound   87979   [x] Neuromuscular Re-education  26022 [] Electrical Stimulation Unattended  53446   [] Manual Therapy  19110 [] Electrical Stimulation Attended  28655   [x] Instruction in HEP  [] Lumbar/Cervical Traction  96395   [x] Aquatic Therapy   72421 [x] Cold/hot pack    [] Massage   42297      [] Dry Needling, 1 or 2 muscles  02448   [] Biofeedback, first 15 minutes   64741  [] Biofeedback, additional 15 minutes   61260 [] Dry Needling, 3 or more muscles  57238     Frequency: 1 x/week for 6 visits    Todays Treatment:  Modalities:   Precautions:none  Exercises:  Exercise Reps/ Time Weight/ Level Comments   Prone laying  2 min   Lesson LBP   TRENT  2 min   Lesson LBP    Prone press ups  10 x   100% extension   Prone press  10 x 2   50% extension   Prone laying  2 min      Improved rising    Prone & sitting    Sitting with roll             Other:    Specific Instructions for next treatment: prone program     Treatment Charges: Mins Units   [x] Evaluation       [x]  Low       []  Moderate       []  High 30 1   [x]  Modalities 15 1   [x]  Ther Exercise     []  Manual Therapy     []  Ther Activities     []  Aquatics     [x]  Neuromuscular 15 1   []  Gait Training     []  Dry Needling           1-2 muscles     []  Dry Needling           3 or more muscles     [] Vasocompression     []  Other       TOTAL TREATMENT TIME: 60 min     Time in: 3:30 PM       Time out: 4:30 PM    Electronically signed by: Umang Bird PT

## 2021-03-18 ENCOUNTER — OFFICE VISIT (OUTPATIENT)
Dept: PODIATRY | Age: 31
End: 2021-03-18
Payer: COMMERCIAL

## 2021-03-18 VITALS — BODY MASS INDEX: 33.6 KG/M2 | HEIGHT: 71 IN | WEIGHT: 240 LBS

## 2021-03-18 DIAGNOSIS — M54.16 LUMBAR RADICULOPATHY: Primary | ICD-10-CM

## 2021-03-18 DIAGNOSIS — M79.671 PAIN IN RIGHT FOOT: ICD-10-CM

## 2021-03-18 DIAGNOSIS — M79.672 PAIN IN LEFT FOOT: ICD-10-CM

## 2021-03-18 PROCEDURE — G8417 CALC BMI ABV UP PARAM F/U: HCPCS | Performed by: PODIATRIST

## 2021-03-18 PROCEDURE — 99203 OFFICE O/P NEW LOW 30 MIN: CPT | Performed by: PODIATRIST

## 2021-03-18 PROCEDURE — G8484 FLU IMMUNIZE NO ADMIN: HCPCS | Performed by: PODIATRIST

## 2021-03-18 PROCEDURE — G8427 DOCREV CUR MEDS BY ELIG CLIN: HCPCS | Performed by: PODIATRIST

## 2021-03-18 PROCEDURE — 1036F TOBACCO NON-USER: CPT | Performed by: PODIATRIST

## 2021-03-18 ASSESSMENT — ENCOUNTER SYMPTOMS
SHORTNESS OF BREATH: 0
COLOR CHANGE: 0
NAUSEA: 0
DIARRHEA: 0
BACK PAIN: 1

## 2021-03-18 NOTE — PROGRESS NOTES
Ann Marie Humphrey is a 35 y.o. male who presents to the office today with chief complaint of pain to the bottom of both heels. Chief Complaint   Patient presents with    Foot Pain     b/l heel pain for the past year    Symptoms began about 1 year(s) ago. Patient denies injury to the feet. Patient states that the pain is greatest after a long period of time on his feet. Patient states that his feet feel like they fall asleep, they go numb, and they tingle. Patient states that this occurs when he is in bed sometimes. Pain is rated 10 out of 10 at it's worst and is described as intermittent. Treatments prior to today's visit include: None. Allergies   Allergen Reactions    Penicillins      All cillins       Past Medical History:   Diagnosis Date    Anxiety     Headache        Prior to Admission medications    Medication Sig Start Date End Date Taking? Authorizing Provider   traZODone (DESYREL) 50 MG tablet Take 1 tablet by mouth nightly as needed for Sleep 2/8/21  Yes Ole Silence, APRN - CNP   FLUoxetine (PROZAC) 40 MG capsule Take 1 capsule by mouth daily 12/8/20  Yes Ole Silence, APRN - CNP   butalbital-acetaminophen-caffeine (FIORICET, ESGIC) -98 MG per tablet Take 1 tablet by mouth 2 times daily as needed for Headaches 6/4/20  Yes Ole Silence, APRN - CNP   topiramate (TOPAMAX) 25 MG tablet Take 2 tablets by mouth 2 times daily 6/4/20  Yes Ole Silence, APRN - CNP   Multiple Vitamins-Minerals (MULTIVITAMIN ADULT PO) Take by mouth   Yes Historical Provider, MD   SUMAtriptan (IMITREX) 100 MG tablet Take 1 tablet by mouth once as needed for Migraine 6/4/20 2/19/21  Ole Silence, APRN - CNP       No past surgical history on file.     Family History   Problem Relation Age of Onset    Heart Disease Maternal Grandmother     Heart Disease Maternal Grandfather     Heart Attack Father         'minor heart attack' 40s        Social History     Tobacco Use    Smoking status: Former Smoker     Packs/day: 1.00     Years: 10.00     Pack years: 10.00     Types: Cigarettes     Quit date: 10/23/2016     Years since quittin.4    Smokeless tobacco: Never Used    Tobacco comment: quit oct 2016   Substance Use Topics    Alcohol use: Yes     Alcohol/week: 0.0 standard drinks     Comment: social       Review of Systems   Constitutional: Negative for activity change, appetite change, chills, diaphoresis, fatigue and fever. Respiratory: Negative for shortness of breath. Cardiovascular: Negative for leg swelling. Gastrointestinal: Negative for diarrhea and nausea. Endocrine: Negative for cold intolerance, heat intolerance and polyuria. Musculoskeletal: Positive for back pain. Negative for arthralgias, gait problem, joint swelling and myalgias. Skin: Negative for color change, pallor, rash and wound. Allergic/Immunologic: Negative for environmental allergies and food allergies. Neurological: Negative for dizziness, weakness, light-headedness and numbness. Hematological: Does not bruise/bleed easily. Psychiatric/Behavioral: Negative for behavioral problems, confusion and self-injury. The patient is not nervous/anxious. Vitals: There were no vitals filed for this visit. General: AAO x 3 in NAD. Integument: There are no rashes, ulcers, or breaks in the skin noted to the bilateral lower extremities. There is no induration, subcutaneous nodules, or tightening of the skin noted to the bilateral.     Toenails 1-5 of the right foot do not present with thickness, elongation, discoloration, brittleness, subungual debris. Toenails 1-5 of the left foot do not present with thickness, elongation, discoloration, brittleness, subungual debris. Interdigital maceration absent to web spaces 1-4, Bilateral.     There are no preulcerative lesions noted to the right foot. There are no preulcerative lesions noted to the left foot.      The skin to the bilateral feet is not thin and shiny. The skin to the bilateral feet is  warm, supple, and dry. Vascular: DP pulse of the right foot is  palpable. DP pulse of the left foot is  palpable. PT pulse of the right foot is  palpable. PT pulse of the left foot is  palpable. CFT is less than 3 secs to the digits of the right foot. CFT is less than 3 secs to the digits of the left foot. There is no edema noted to the bilateral foot or ankle. There is hair growth noted to the digits of the bilateral feet. There are no varicosities noted to the right foot/ankle. There are no varicosities noted to the left foot/ankle. Erythema is absent to the bilateral feet. Neurological: Reflexes are present to the right plantar foot and to the Achilles tendon. Reflexes are present to the left plantar foot and to the Achilles tendon. Epicritic sensation is  intact to the right foot. Epicritic sensation is  intact to the left foot. Musculoskeletal:  Muscle strength is +5/5 to all four muscle groups of the right lower extremity and +5/5 to all four muscle groups of the left lower extremity. There are no areas of subluxation, dislocation, or laxity noted to either lower extremity. Range of motion to the right ankle is  free of pain or grinding. Range of motion to the left ankle is  free of pain or grinding. Range of motion to the right subtalar joint is  free of pain or grinding. Range of motion to the left subtalar joint is  free of pain or grinding. No abnormalities, asymmetries, or misalignments are seen between the extremities. Weightbearing evaluation does not reveal rearfoot eversion, medial prominence of the talar head, loss of the medial longitudinal arch height, and too many toes sign bilaterally. The lesser digits of the right foot are not contracted. The lesser digits of the left foot are not contracted.      There is no prominence noted to the first metatarsal head without abduction of the hallux of the right foot. There is no prominence noted to the first metatarsal head without abduction of the hallux of the left foot. Shoe examination was performed. Biomechanical Exam: normal bilaterally. Asessment: Patient is a 35 y.o. male with:    Diagnosis Orders   1. Lumbar radiculopathy  [facility], [facility]   2. Pain in left foot  [facility]   3. Pain in right foot  [facility], [phone]:  1. Clinical evaluation of the patient. 2. Patient informed that I find no issues with his feet specifically. Patient informed that based on the pain he describes, I believe his issue is radiculopathy of his lower back. Patient states that he was sent to physical therapy for his back and had xrays taken of his lower back. These xrays show possible radiculopathy. Patient given a referral to a neurosurgeon for evaluation and treatment. 3. Contact office with any questions/problems/concerns. Return if symptoms worsen or fail to improve.    3/18/2021      Conrado Kaur DPM

## 2021-03-19 ENCOUNTER — OFFICE VISIT (OUTPATIENT)
Dept: INTERNAL MEDICINE CLINIC | Age: 31
End: 2021-03-19
Payer: COMMERCIAL

## 2021-03-19 VITALS
HEIGHT: 71 IN | OXYGEN SATURATION: 98 % | TEMPERATURE: 97.2 F | HEART RATE: 73 BPM | DIASTOLIC BLOOD PRESSURE: 66 MMHG | WEIGHT: 240 LBS | BODY MASS INDEX: 33.6 KG/M2 | SYSTOLIC BLOOD PRESSURE: 128 MMHG

## 2021-03-19 DIAGNOSIS — G89.29 CHRONIC MIDLINE LOW BACK PAIN WITH BILATERAL SCIATICA: Primary | ICD-10-CM

## 2021-03-19 DIAGNOSIS — M54.41 CHRONIC MIDLINE LOW BACK PAIN WITH BILATERAL SCIATICA: Primary | ICD-10-CM

## 2021-03-19 DIAGNOSIS — M54.42 CHRONIC MIDLINE LOW BACK PAIN WITH BILATERAL SCIATICA: Primary | ICD-10-CM

## 2021-03-19 PROCEDURE — G8417 CALC BMI ABV UP PARAM F/U: HCPCS | Performed by: NURSE PRACTITIONER

## 2021-03-19 PROCEDURE — G8484 FLU IMMUNIZE NO ADMIN: HCPCS | Performed by: NURSE PRACTITIONER

## 2021-03-19 PROCEDURE — 1036F TOBACCO NON-USER: CPT | Performed by: NURSE PRACTITIONER

## 2021-03-19 PROCEDURE — G8427 DOCREV CUR MEDS BY ELIG CLIN: HCPCS | Performed by: NURSE PRACTITIONER

## 2021-03-19 PROCEDURE — 99213 OFFICE O/P EST LOW 20 MIN: CPT | Performed by: NURSE PRACTITIONER

## 2021-03-19 ASSESSMENT — ENCOUNTER SYMPTOMS
DIARRHEA: 0
BACK PAIN: 1
BLOOD IN STOOL: 0
TROUBLE SWALLOWING: 0
VOMITING: 0
CONSTIPATION: 0
SHORTNESS OF BREATH: 0
WHEEZING: 0
SORE THROAT: 0
CHEST TIGHTNESS: 0
ABDOMINAL PAIN: 0
RHINORRHEA: 0
NAUSEA: 0
SINUS PAIN: 0
COUGH: 0

## 2021-03-19 ASSESSMENT — PATIENT HEALTH QUESTIONNAIRE - PHQ9
SUM OF ALL RESPONSES TO PHQ QUESTIONS 1-9: 0
SUM OF ALL RESPONSES TO PHQ QUESTIONS 1-9: 0

## 2021-03-19 NOTE — PROGRESS NOTES
Visit Information    Have you changed or started any medications since your last visit including any over-the-counter medicines, vitamins, or herbal medicines? no   Are you having any side effects from any of your medications? -  no  Have you stopped taking any of your medications? Is so, why? -  no    Have you seen any other physician or provider since your last visit? Yes - Records Obtained  Have you had any other diagnostic tests since your last visit? No  Have you been seen in the emergency room and/or had an admission to a hospital since we last saw you? No  Have you had your routine dental cleaning in the past 6 months? no    Have you activated your [employer] account? If not, what are your barriers?  Yes     Patient Care Team:  Prabhu Weiss MD as PCP - General (Internal Medicine)  Prabhu Weiss MD as PCP - [facility]    Medical History Review  Past Medical, Family, and Social History reviewed and does contribute to the patient presenting condition    Health Maintenance   Topic Date Due    Varicella vaccine (1 of 2 - 2-dose childhood series) Never done    Hepatitis B vaccine (3 of 3 - 3-dose primary series) 09/10/2002    COVID-19 Vaccine (1) Never done    Flu vaccine (1) Never done    DTaP/Tdap/Td vaccine (2 - Td) 06/25/2024    Hepatitis C screen  Completed    HIV screen  Completed    Hepatitis A vaccine  Aged Out    Hib vaccine  Aged Out    Meningococcal (ACWY) vaccine  Aged Out    Pneumococcal 0-64 years Vaccine  Aged Out         [address] [address]  Dept: [phone]  Dept Fax: [phone]    OfficeProgress/Follow Up Note  Date of patient's visit: 3/19/2021  Patient's Name:  Ritesh Reynaga YOB: 1990            Patient Care Team:  Prabhu Weiss MD as PCP - General (Internal Medicine)  Prabhu Weiss MD as PCP - [facility]    REASON FOR VISIT:  Routine outpatient follow up    HISTORY OF PRESENT ILLNESS:      Chief Complaint   Patient presents with    Back Pain    Foot Pain       History was obtained from the patient. Alan Cheng is a 35 y.o. male here today for chronic lumbar back pain, alleviated with rest. Pain is aggravated with ambulation. Patient reports he has been seen by PT provided with exercises for back and neck. States exercises have provided minimal relief. Podiatrist evaluated patient yesterday. States he was referred to neurosurgery for back pain and bilateral foot pain. Patient Active Problem List   Diagnosis    Facial injury    Former smoker    Panic attacks    Blurred vision    Episodic altered awareness    Migraine without aura and without status migrainosus, not intractable    Excessive daytime sleepiness    ELIZABETH (obstructive sleep apnea)       Health Maintenance Due   Topic Date Due    Varicella vaccine (1 of 2 - 2-dose childhood series) Never done    Hepatitis B vaccine (3 of 3 - 3-dose primary series) 09/10/2002    COVID-19 Vaccine (1) Never done    Flu vaccine (1) Never done       MEDICATIONS:      Current Outpatient Medications   Medication Sig Dispense Refill    traZODone (DESYREL) 50 MG tablet Take 1 tablet by mouth nightly as needed for Sleep 30 tablet 11    FLUoxetine (PROZAC) 40 MG capsule Take 1 capsule by mouth daily 30 capsule 11    butalbital-acetaminophen-caffeine (FIORICET, ESGIC) -40 MG per tablet Take 1 tablet by mouth 2 times daily as needed for Headaches 60 tablet 11    topiramate (TOPAMAX) 25 MG tablet Take 2 tablets by mouth 2 times daily 120 tablet 11    Multiple Vitamins-Minerals (MULTIVITAMIN ADULT PO) Take by mouth      SUMAtriptan (IMITREX) 100 MG tablet Take 1 tablet by mouth once as needed for Migraine 9 tablet 5     No current facility-administered medications for this visit.         ALLERGIES:      Allergies   Allergen Reactions    Penicillins      All cillins         SOCIAL HISTORY    Reviewed and no change from previous record. Александр Roa  reports that he quit smoking about 4 years ago. His smoking use included cigarettes. He has a 10.00 pack-year smoking history. He has never used smokeless tobacco.    FAMILY HISTORY:    Reviewed and No change from previous visit    REVIEW OF SYSTEMS:    Review of Systems   Constitutional: Negative for appetite change, diaphoresis, fatigue, fever and unexpected weight change. HENT: Negative for ear pain, postnasal drip, rhinorrhea, sinus pain, sore throat and trouble swallowing. Eyes: Negative for visual disturbance. Respiratory: Negative for cough, chest tightness, shortness of breath and wheezing. Cardiovascular: Negative for chest pain, palpitations and leg swelling. Gastrointestinal: Negative for abdominal pain, blood in stool, constipation, diarrhea, nausea and vomiting. Endocrine: Negative for polydipsia, polyphagia and polyuria. Genitourinary: Negative for difficulty urinating, dysuria and flank pain. Musculoskeletal: Positive for arthralgias and back pain. Negative for myalgias. Skin: Negative for rash and wound. Neurological: Positive for numbness (BLE). Negative for dizziness, syncope and weakness. All other systems reviewed and are negative. PHYSICAL EXAM:      Vitals:    03/19/21 1235   BP: 128/66   Pulse: 73   Temp: 97.2 °F (36.2 °C)   SpO2: 98%   Weight: 240 lb (108.9 kg)   Height: 5' 11\" (1.803 m)     BP Readings from Last 3 Encounters:   03/19/21 128/66   02/19/21 124/64   02/08/21 113/72      Wt Readings from Last 3 Encounters:   03/19/21 240 lb (108.9 kg)   03/18/21 240 lb (108.9 kg)   02/19/21 241 lb (109.3 kg)       Physical Exam  Vitals signs reviewed. Constitutional:       Appearance: Normal appearance. HENT:      Head: Normocephalic. Cardiovascular:      Rate and Rhythm: Normal rate and regular rhythm. Pulses: Normal pulses. Heart sounds: Normal heart sounds.    Pulmonary:      Effort: Pulmonary effort is normal. Breath sounds: Normal breath sounds. Abdominal:      General: Bowel sounds are normal.      Palpations: Abdomen is soft. Musculoskeletal: Normal range of motion. General: No swelling, tenderness or deformity. Skin:     General: Skin is warm and dry. Neurological:      General: No focal deficit present. Mental Status: He is alert and oriented to person, place, and time. Psychiatric:         Mood and Affect: Mood normal.         Behavior: Behavior normal.         Thought Content: Thought content normal.         Judgment: Judgment normal.          LABORATORY FINDINGS:    CBC:  Lab Results   Component Value Date    WBC 14.9 10/14/2019    HGB 14.5 10/14/2019     10/14/2019       BMP:    Lab Results   Component Value Date     10/14/2019    K 3.8 10/14/2019     10/14/2019    CO2 26 10/14/2019    BUN 16 10/14/2019    CREATININE 1.12 10/14/2019    GLUCOSE 99 10/14/2019       HEMOGLOBIN A1C:   Lab Results   Component Value Date    LABA1C 5.1 03/26/2016       FASTING LIPID PANEL:  Lab Results   Component Value Date    CHOL 185 03/26/2016    HDL 39 (L) 03/26/2016    TRIG 124 03/26/2016       ASSESSMENT AND PLAN:      1. Chronic midline low back pain with bilateral sciatica  - previous Xray show mild degeneration in spine, since patient continues to have radicular symptoms following PT, would like MRI for further investigation. - patient encouraged to keep his follow up with neurosurgery for back pain  - MRI LUMBAR SPINE [address]; Future      FOLLOW UP AND INSTRUCTIONS:   No follow-ups on file. Veloz Fudgdaquan received counseling on the following healthy behaviors: nutrition, exercise and medication adherence    Discussed use, benefit, and side effects of prescribed medications. Barriers to medication compliance addressed. All patient questions answered. Patient voiced understanding.      Patient given educational materials - see patient instructions    LEANDRA Collins - CNP SHAN MARTIN [facility]  3/19/2021, 12:44 PM    Please note that this chart was generated using voice recognition Dragon dictation software. Although everyeffort was made to ensure the accuracy of this automated transcription, some errors in transcription may have occurred.

## 2021-03-25 ENCOUNTER — HOSPITAL ENCOUNTER (OUTPATIENT)
Dept: PHYSICAL THERAPY | Age: 31
Setting detail: THERAPIES SERIES
Discharge: HOME | End: 2021-03-25
Payer: COMMERCIAL

## 2021-03-25 NOTE — PROGRESS NOTES
[address] [facility]  [address] [facility].  [address]         Phone: [phone]       Fax: [phone]     Physical Therapy No Show Note    Date:  3/25/2021  Patient: Dorian Dewitt  : 1990  MRN: [id number]  Physician: Arin Martinez MD   Insurance: [facility] Diagnosis: Lumbar pain, Cervical pain   Rehab Codes: (M54.5;M54.2)  Onset Date: 06-  Next Dr.'s appt.: TBD  Commets: No show     Electronically signed by: Rosa Miranda PT

## 2021-03-29 ENCOUNTER — HOSPITAL ENCOUNTER (OUTPATIENT)
Dept: MRI IMAGING | Facility: CLINIC | Age: 31
Discharge: HOME | End: 2021-03-31
Payer: COMMERCIAL

## 2021-03-29 DIAGNOSIS — M54.42 CHRONIC MIDLINE LOW BACK PAIN WITH BILATERAL SCIATICA: ICD-10-CM

## 2021-03-29 DIAGNOSIS — G89.29 CHRONIC MIDLINE LOW BACK PAIN WITH BILATERAL SCIATICA: ICD-10-CM

## 2021-03-29 DIAGNOSIS — M54.41 CHRONIC MIDLINE LOW BACK PAIN WITH BILATERAL SCIATICA: ICD-10-CM

## 2021-03-29 PROCEDURE — 72148 MRI LUMBAR SPINE W/O DYE: CPT

## 2021-04-01 ENCOUNTER — OFFICE VISIT (OUTPATIENT)
Dept: NEUROSURGERY | Age: 31
End: 2021-04-01
Payer: COMMERCIAL

## 2021-04-01 ENCOUNTER — HOSPITAL ENCOUNTER (OUTPATIENT)
Dept: PHYSICAL THERAPY | Age: 31
Setting detail: THERAPIES SERIES
Discharge: HOME | End: 2021-04-01
Payer: COMMERCIAL

## 2021-04-01 VITALS — SYSTOLIC BLOOD PRESSURE: 137 MMHG | HEART RATE: 62 BPM | RESPIRATION RATE: 16 BRPM | DIASTOLIC BLOOD PRESSURE: 81 MMHG

## 2021-04-01 DIAGNOSIS — M47.816 LUMBAR SPONDYLOSIS: ICD-10-CM

## 2021-04-01 DIAGNOSIS — M54.2 CERVICALGIA: ICD-10-CM

## 2021-04-01 DIAGNOSIS — R20.2 NUMBNESS AND TINGLING OF BOTH FEET: Primary | ICD-10-CM

## 2021-04-01 DIAGNOSIS — R20.0 NUMBNESS AND TINGLING OF BOTH FEET: Primary | ICD-10-CM

## 2021-04-01 PROCEDURE — G8427 DOCREV CUR MEDS BY ELIG CLIN: HCPCS | Performed by: NURSE PRACTITIONER

## 2021-04-01 PROCEDURE — 99204 OFFICE O/P NEW MOD 45 MIN: CPT | Performed by: NURSE PRACTITIONER

## 2021-04-01 PROCEDURE — G8417 CALC BMI ABV UP PARAM F/U: HCPCS | Performed by: NURSE PRACTITIONER

## 2021-04-01 PROCEDURE — 1036F TOBACCO NON-USER: CPT | Performed by: NURSE PRACTITIONER

## 2021-04-01 NOTE — PROGRESS NOTES
[facility]  [address] [place] [address].  [address]         Phone: [phone]       Fax: [phone]     Physical Therapy No Show Note    Date:  2021  Patient: Saumya Santiago  : 1990  MRN: [id number]  Physician: Andrea Wolff MD   Insurance: [facility] [address]   Medical Diagnosis: Lumbar pain, Cervical pain   Rehab Codes: (M54.5;M54.2)  Onset Date: 06-  Luzmaria Howell appt.: TBD  Vvisits     Cx/Ns 0/2  Commets: No show x 2    Electronically signed by: Murali Ferrer PT

## 2021-04-01 NOTE — PROGRESS NOTES
[facility]  [facility] [address] [facility] [address]  Dept: [phone]    Patient:  Adriane Berg  YOB: 1990  Date: 4/1/21    The patient is a 35 y.o. male who presents today for consult of the following problems:     Chief Complaint   Patient presents with    New Patient     left foot drop    Back Pain         HPI:     Adriane Berg is a 35 y.o. male on whom neurosurgical consultation was requested by Guille James MD for management of neck, back and leg pain. Pt has had low back pain for many years, typically while working and with increased activity. Has pain, numbness, and tingling to bilateral heels. Pain on average is 6-7/10, can reach 10/10 after working. Has been to 1 PT session so far, missed his 2nd appointment. Sitting and lying down improves the pain. Standing, walking, working worsen the pain. Has also started having some axial cervical discomfort. Denies any radiation into upper extremities, no numbness or tingling to arms or hands. Was initially evaluated by his PCP, referred for physical therapy and podiatry evaluation. Podiatrist did not feel it was foot related, suspected possible lumbar radiculopathy, sent for MRI and referred here for further evaluation. Groin pain: No  Saddle anesthesia: No  Hip Pain: Yes-sometimes  Bowel/bladder incontinence: No  Constipation or Urinary retention: No    LIMITATIONS    Pain significantly limiting from a daily standpoint. Assistive devices: none  Daily pharmacologic pain control include: advil  Back versus leg: back    MANAGEMENT     Prior Surgery: No  Prior to 1yr ago: In the last year:    Physical Therapy: Yes-1 session   Chiropractic Interventions: No   Injections: No  Improvement: n/a    Types of injections/responses: n/a    History:     Past Medical History:   Diagnosis Date    Anxiety     Headache      History reviewed. No pertinent surgical history. Family History   Problem Relation Age of Onset    Heart Disease Maternal Grandmother     Heart Disease Maternal Grandfather     Heart Attack Father         'minor heart attack' 40s      Current Outpatient Medications on File Prior to Visit   Medication Sig Dispense Refill    traZODone (DESYREL) 50 MG tablet Take 1 tablet by mouth nightly as needed for Sleep 30 tablet 11    FLUoxetine (PROZAC) 40 MG capsule Take 1 capsule by mouth daily 30 capsule 11    butalbital-acetaminophen-caffeine (FIORICET, ESGIC) -40 MG per tablet Take 1 tablet by mouth 2 times daily as needed for Headaches 60 tablet 11    topiramate (TOPAMAX) 25 MG tablet Take 2 tablets by mouth 2 times daily 120 tablet 11    Multiple Vitamins-Minerals (MULTIVITAMIN ADULT PO) Take by mouth      SUMAtriptan (IMITREX) 100 MG tablet Take 1 tablet by mouth once as needed for Migraine 9 tablet 5     No current facility-administered medications on file prior to visit. Social History     Tobacco Use    Smoking status: Former Smoker     Packs/day: 1.00     Years: 10.00     Pack years: 10.00     Types: Cigarettes     Quit date: 10/23/2016     Years since quittin.4    Smokeless tobacco: Never Used    Tobacco comment: quit oct 2016   Substance Use Topics    Alcohol use: Yes     Alcohol/week: 0.0 standard drinks     Comment: social    Drug use: No       Allergies   Allergen Reactions    Penicillins      All cillins       Review of Systems  Constitutional: Negative for activity change and appetite change. HENT: Negative for ear pain and facial swelling. Eyes: Negative for discharge and itching. Respiratory: Negative for choking and chest tightness. Cardiovascular: Negative for chest pain and leg swelling. Gastrointestinal: Negative for nausea and abdominal pain. Endocrine: Negative for cold intolerance and heat intolerance. Genitourinary: Negative for frequency and flank pain.    Musculoskeletal: Negative for myalgias and joint swelling. Skin: Negative for rash and wound. Allergic/Immunologic: Negative for environmental allergies and food allergies. Hematological: Negative for adenopathy. Does not bruise/bleed easily. Psychiatric/Behavioral: Negative for self-injury. The patient is not nervous/anxious. Physical Exam:      /81 (Site: Right Upper Arm, Position: Sitting, Cuff Size: Medium Adult)   Pulse 62   Resp 16   Estimated body mass index is 33.47 kg/m² as calculated from the following:    Height as of 3/19/21: 5' 11\" (1.803 m). Weight as of 3/19/21: 240 lb (108.9 kg). General:  Adriaen Berg is a 35y.o. year old male who appears his stated age. HEENT: Normocephalic atraumatic. Neck supple. Chest: regular rate; pulses equal  Abdomen: Soft nontender nondistended.    Ext: DP and PT pulses 2+, good cap refill  Neuro    Mentation  Appropriate affect  Registration intact  Orientation intact    Cranial Nerves:   Pupils equal and reactive to light  Extraocular motion intact  Face and shrug symmetric  Tongue midline  No dysarthria  v1-3 sensation symmetric, masseter tone symmetric  Hearing symmetric and intact    Sensation: intact    Motor  L deltoid 5/5; R deltoid 5/5  L biceps 5/5; R biceps 5/5  L triceps 5/5; R triceps 5/5  L wrist extension 5/5; R wrist extension 5/5  L intrinsics 5/5; R intrinsics 5/5     L iliopsoas 5/5 , R iliopsoas 5/5  L quadriceps 5/5; R quadriceps 5/5  L Dorsiflexion 5/5; R dorsiflexion 5/5  L Plantarflexion 5/5; R plantarflexion 5/5  L EHL 5/5; R EHL 5/5    Reflexes  L Brachioradialis 2+/4; R brachioradialis 2+/4  L Biceps 2+/4; R Biceps 2+/4  L Triceps 2+/4; R Triceps 2+/4  L Patellar 2+/4: R Patellar 2+/4  L Achilles 2+/4; R Achilles 2+/4    hoffmans L: neg  hoffmans R: neg  Clonus L: neg  Clonus R: neg  Babinski L: neg  Babinski R: neg    JOLENE: negative  Straight leg raise: negative  SI joint tenderness: negative    Studies Review:     MRI lumbar spine 3/29/2021 (images reviewed): FINDINGS:   BONES/ALIGNMENT: The vertebral body heights are maintained.  There is   age-appropriate bone marrow signal.  There is degenerative disc disease in   the lower lumbar spine with loss of disc signal.  There is mild disc space   narrowing at L5-S1.  There is no spondylolisthesis.       SPINAL CORD: The conus medullaris is normal in caliber and signal and   terminates at the T12-L1 level.  The cauda equina is unremarkable.       SOFT TISSUES: The posterior paraspinal soft tissues are unremarkable.  The   visualized abdominal structures are unremarkable.       L1-L2: There is no significant disc herniation, spinal canal stenosis or   neural foraminal narrowing.       L2-L3: There is no significant disc herniation, spinal canal stenosis or   neural foraminal narrowing.       L3-L4: There is no significant disc herniation, spinal canal stenosis or   neural foraminal narrowing.       L4-L5: There is a circumferential disc bulge with a small posterior annular   tear.  There is no canal stenosis or foraminal narrowing.       L5-S1: There is a circumferential disc bulge.  There is no canal stenosis or   foraminal narrowing. X-ray cervical spine 2/19/2021 (images reviewed):  Lateral view of the cervical spine visualizes the skull base through upper C7   level.  No gross vertebral body malalignment appreciated in the cervical   region.  Base of the dens appears intact.  Facets unremarkable.  Prevertebral   soft tissues unremarkable.  Posterior elements unremarkable. X-ray lumbar spine 2/19/2021 (images reviewed):  Lumbar spine demonstrates no compression fracture.  Less than 1 mm   retrolisthesis L3 on L4, similar to the prior study.  Less than 1 mm   retrolisthesis of L2 on L3.  Mild facet hypertrophy at the lumbosacral   junction.  Mild degenerative disc disease at the lumbosacral junction. Assessment and Plan:      1. Numbness and tingling of both feet    2. Lumbar spondylosis    3. Cervicalgia          Plan: Patient presents with 2-year history of axial cervical and lumbar pain, as well as pain, numbness, tingling to bilateral heels. Symptoms are worse after prolonged standing, walking, working. Patient has so far completed 1 physical therapy session, no additional conservative measures to date. MRI and x-ray images reviewed with patient, does have multilevel degenerative changes, however no corresponding central or foraminal stenosis to account for heel symptoms. In regards to cervical pain, patient does not currently have any symptoms concerning for myelopathy, radiculopathy. Would at this point recommend that he proceed with course of physical therapy, and obtain EMG bilateral lower extremities to further evaluate numbness and tingling to bilateral feet. We will see the patient back in 12 weeks for reevaluation. Followup: Return in about 3 months (around 7/1/2021), or if symptoms worsen or fail to improve. Prescriptions Ordered:  No orders of the defined types were placed in this encounter. Orders Placed:  Orders Placed This Encounter   Procedures    EMG     Standing Status:   Future     Standing Expiration Date:   4/1/2022     Order Specific Question:   Which body part? Answer:   bilateral lower extremities        Electronically signed by LEANDRA Gonsalez CNP on 4/1/2021 at 12:51 PM    Please note that this chart was generated using voice recognition Dragon dictation software. Although every effort was made to ensure the accuracy of this automated transcription, some errors in transcription may have occurred.

## 2021-04-08 ENCOUNTER — HOSPITAL ENCOUNTER (OUTPATIENT)
Dept: PHYSICAL THERAPY | Age: 31
Setting detail: THERAPIES SERIES
Discharge: HOME | End: 2021-04-08
Payer: COMMERCIAL

## 2021-06-08 RX ORDER — TOPIRAMATE 25 MG/1
TABLET ORAL
Qty: 120 TABLET | Refills: 0 | Status: SHIPPED | OUTPATIENT
Start: 2021-06-08 | End: 2021-08-09 | Stop reason: SDUPTHER

## 2021-06-08 NOTE — TELEPHONE ENCOUNTER
Pharmacy requesting refill of   Topiramate 25 mg..      Medication active on med list yes      Date of last fill: 6/4/2020  with 11 refills verified on 6/8/21  verified by BERTRAND GOODMAN      Date of last appointment 2/8/21    Next Visit Date:  8/9/2021

## 2021-06-14 RX ORDER — BUTALBITAL, ACETAMINOPHEN AND CAFFEINE 50; 325; 40 MG/1; MG/1; MG/1
TABLET ORAL
Qty: 60 TABLET | Refills: 0 | Status: SHIPPED | OUTPATIENT
Start: 2021-06-14 | End: 2021-08-02

## 2021-06-14 NOTE — TELEPHONE ENCOUNTER
0Pharmacy requesting refill of Butalbital.      Medication active on med list yes      Date of last fill: 6/4/2020 #60  with 11 refills verified on 6/14/21  verified by BERTRAND GOODAMN      Date of last appointment 2/8/21    Next Visit Date:  8/9/2021

## 2021-06-18 ENCOUNTER — TELEPHONE (OUTPATIENT)
Dept: INTERNAL MEDICINE CLINIC | Age: 31
End: 2021-06-18

## 2021-07-31 DIAGNOSIS — G43.009 MIGRAINE WITHOUT AURA AND WITHOUT STATUS MIGRAINOSUS, NOT INTRACTABLE: Primary | ICD-10-CM

## 2021-08-02 NOTE — TELEPHONE ENCOUNTER
Pharmacy requesting a  refill of:   Fioricet -40mg        Medication active on med list yes        Date of last prescription: 06/14/2021  with 0  refills verified on 08/02/2021    verified by KAY VALLES        Date of last appointment 02/08/2021     Next Visit Date: 08/09/2021

## 2021-08-03 RX ORDER — BUTALBITAL, ACETAMINOPHEN AND CAFFEINE 50; 325; 40 MG/1; MG/1; MG/1
TABLET ORAL
Qty: 60 TABLET | Refills: 1 | Status: SHIPPED | OUTPATIENT
Start: 2021-08-03

## 2021-08-06 ENCOUNTER — TELEPHONE (OUTPATIENT)
Dept: NEUROLOGY | Age: 31
End: 2021-08-06

## 2021-08-06 NOTE — TELEPHONE ENCOUNTER
Sarah Jenny had to cancel his appt for Monday because he is working out of town in [place]. He can't do virtual appt either since he is in [place]. He is having quite a bit of headache and mentions he has to take the Fioricet twice a day. he is taking Topamax 25 mg. two tablets bid. Is asking if there is something more to do.

## 2021-08-09 RX ORDER — TRAZODONE HYDROCHLORIDE 50 MG/1
100 TABLET ORAL NIGHTLY PRN
Qty: 60 TABLET | Refills: 5 | Status: SHIPPED | OUTPATIENT
Start: 2021-08-09 | End: 2022-03-07 | Stop reason: SDUPTHER

## 2021-08-09 RX ORDER — TOPIRAMATE 100 MG/1
100 TABLET, FILM COATED ORAL 2 TIMES DAILY
Qty: 60 TABLET | Refills: 5 | Status: SHIPPED | OUTPATIENT
Start: 2021-08-09 | End: 2022-03-07 | Stop reason: SDUPTHER

## 2021-08-09 NOTE — TELEPHONE ENCOUNTER
Prescription was sent for the increase in Topamax and I also increase the trazodone to 2 tablets at bedtime daily

## 2022-02-20 ENCOUNTER — APPOINTMENT (OUTPATIENT)
Dept: CT IMAGING | Age: 32
End: 2022-02-20
Payer: COMMERCIAL

## 2022-02-20 ENCOUNTER — HOSPITAL ENCOUNTER (EMERGENCY)
Age: 32
Discharge: HOME | End: 2022-02-21
Attending: STUDENT IN AN ORGANIZED HEALTH CARE EDUCATION/TRAINING PROGRAM
Payer: COMMERCIAL

## 2022-02-20 ENCOUNTER — APPOINTMENT (OUTPATIENT)
Dept: MRI IMAGING | Age: 32
End: 2022-02-20
Payer: COMMERCIAL

## 2022-02-20 VITALS
HEIGHT: 70 IN | RESPIRATION RATE: 16 BRPM | DIASTOLIC BLOOD PRESSURE: 70 MMHG | BODY MASS INDEX: 33.64 KG/M2 | HEART RATE: 70 BPM | WEIGHT: 235 LBS | TEMPERATURE: 98.3 F | SYSTOLIC BLOOD PRESSURE: 124 MMHG | OXYGEN SATURATION: 98 %

## 2022-02-20 DIAGNOSIS — M48.061 SPINAL STENOSIS OF LUMBAR REGION WITHOUT NEUROGENIC CLAUDICATION: Primary | ICD-10-CM

## 2022-02-20 LAB
-: NORMAL
ABSOLUTE EOS #: 0.2 K/UL (ref 0–0.4)
ABSOLUTE IMMATURE GRANULOCYTE: ABNORMAL K/UL (ref 0–0.3)
ABSOLUTE LYMPH #: 2.8 K/UL (ref 1–4.8)
ABSOLUTE MONO #: 0.8 K/UL (ref 0.1–1.3)
ANION GAP SERPL CALCULATED.3IONS-SCNC: 11 MMOL/L (ref 9–17)
BASOPHILS # BLD: 1 % (ref 0–2)
BASOPHILS ABSOLUTE: 0 K/UL (ref 0–0.2)
BUN BLDV-MCNC: 16 MG/DL (ref 6–20)
BUN/CREAT BLD: NORMAL (ref 9–20)
C-REACTIVE PROTEIN: 6.4 MG/L (ref 0–5)
CALCIUM SERPL-MCNC: 8.8 MG/DL (ref 8.6–10.4)
CHLORIDE BLD-SCNC: 103 MMOL/L (ref 98–107)
CO2: 22 MMOL/L (ref 20–31)
CREAT SERPL-MCNC: 0.95 MG/DL (ref 0.7–1.2)
DIFFERENTIAL TYPE: ABNORMAL
EOSINOPHILS RELATIVE PERCENT: 3 % (ref 0–4)
GFR AFRICAN AMERICAN: >60 ML/MIN
GFR NON-AFRICAN AMERICAN: >60 ML/MIN
GFR SERPL CREATININE-BSD FRML MDRD: NORMAL ML/MIN/{1.73_M2}
GFR SERPL CREATININE-BSD FRML MDRD: NORMAL ML/MIN/{1.73_M2}
GLUCOSE BLD-MCNC: 95 MG/DL (ref 70–99)
HCT VFR BLD CALC: 40.4 % (ref 41–53)
HEMOGLOBIN: 14 G/DL (ref 13.5–17.5)
IMMATURE GRANULOCYTES: ABNORMAL %
LYMPHOCYTES # BLD: 39 % (ref 24–44)
MCH RBC QN AUTO: 29.1 PG (ref 26–34)
MCHC RBC AUTO-ENTMCNC: 34.8 G/DL (ref 31–37)
MCV RBC AUTO: 83.7 FL (ref 80–100)
MONOCYTES # BLD: 11 % (ref 1–7)
NRBC AUTOMATED: ABNORMAL PER 100 WBC
PDW BLD-RTO: 13.5 % (ref 11.5–14.9)
PLATELET # BLD: 255 K/UL (ref 150–450)
PLATELET ESTIMATE: ABNORMAL
PMV BLD AUTO: 8 FL (ref 6–12)
POTASSIUM SERPL-SCNC: 3.8 MMOL/L (ref 3.7–5.3)
RBC # BLD: 4.82 M/UL (ref 4.5–5.9)
RBC # BLD: ABNORMAL 10*6/UL
REASON FOR REJECTION: NORMAL
SEDIMENTATION RATE, ERYTHROCYTE: 2 MM (ref 0–15)
SEG NEUTROPHILS: 46 % (ref 36–66)
SEGMENTED NEUTROPHILS ABSOLUTE COUNT: 3.5 K/UL (ref 1.3–9.1)
SODIUM BLD-SCNC: 136 MMOL/L (ref 135–144)
WBC # BLD: 7.4 K/UL (ref 3.5–11)
WBC # BLD: ABNORMAL 10*3/UL
ZZ NTE CLEAN UP: ORDERED TEST: NORMAL
ZZ NTE WITH NAME CLEAN UP: SPECIMEN SOURCE: NORMAL

## 2022-02-20 PROCEDURE — 80048 BASIC METABOLIC PNL TOTAL CA: CPT

## 2022-02-20 PROCEDURE — 72148 MRI LUMBAR SPINE W/O DYE: CPT

## 2022-02-20 PROCEDURE — 85025 COMPLETE CBC W/AUTO DIFF WBC: CPT

## 2022-02-20 PROCEDURE — 36415 COLL VENOUS BLD VENIPUNCTURE: CPT

## 2022-02-20 PROCEDURE — 72131 CT LUMBAR SPINE W/O DYE: CPT

## 2022-02-20 PROCEDURE — 99283 EMERGENCY DEPT VISIT LOW MDM: CPT

## 2022-02-20 PROCEDURE — 6360000002 HC RX W HCPCS: Performed by: STUDENT IN AN ORGANIZED HEALTH CARE EDUCATION/TRAINING PROGRAM

## 2022-02-20 PROCEDURE — 85652 RBC SED RATE AUTOMATED: CPT

## 2022-02-20 PROCEDURE — 96374 THER/PROPH/DIAG INJ IV PUSH: CPT

## 2022-02-20 PROCEDURE — 86140 C-REACTIVE PROTEIN: CPT

## 2022-02-20 PROCEDURE — 96372 THER/PROPH/DIAG INJ SC/IM: CPT

## 2022-02-20 PROCEDURE — 6370000000 HC RX 637 (ALT 250 FOR IP): Performed by: STUDENT IN AN ORGANIZED HEALTH CARE EDUCATION/TRAINING PROGRAM

## 2022-02-20 RX ORDER — CYCLOBENZAPRINE HCL 5 MG
5 TABLET ORAL 2 TIMES DAILY PRN
Qty: 10 TABLET | Refills: 0 | Status: SHIPPED | OUTPATIENT
Start: 2022-02-20 | End: 2022-03-02

## 2022-02-20 RX ORDER — ORPHENADRINE CITRATE 30 MG/ML
60 INJECTION INTRAMUSCULAR; INTRAVENOUS ONCE
Status: COMPLETED | OUTPATIENT
Start: 2022-02-21 | End: 2022-02-20

## 2022-02-20 RX ORDER — OXYCODONE HYDROCHLORIDE 5 MG/1
5 TABLET ORAL EVERY 6 HOURS PRN
Qty: 6 TABLET | Refills: 0 | Status: SHIPPED | OUTPATIENT
Start: 2022-02-20 | End: 2022-02-23

## 2022-02-20 RX ORDER — HYDROCODONE BITARTRATE AND ACETAMINOPHEN 5; 325 MG/1; MG/1
1 TABLET ORAL ONCE
Status: COMPLETED | OUTPATIENT
Start: 2022-02-21 | End: 2022-02-20

## 2022-02-20 RX ORDER — KETOROLAC TROMETHAMINE 30 MG/ML
30 INJECTION, SOLUTION INTRAMUSCULAR; INTRAVENOUS ONCE
Status: COMPLETED | OUTPATIENT
Start: 2022-02-20 | End: 2022-02-20

## 2022-02-20 RX ORDER — IBUPROFEN 400 MG/1
800 TABLET ORAL EVERY 6 HOURS PRN
Qty: 20 TABLET | Refills: 0 | Status: SHIPPED | OUTPATIENT
Start: 2022-02-20

## 2022-02-20 RX ADMIN — ORPHENADRINE CITRATE 60 MG: 30 INJECTION INTRAMUSCULAR; INTRAVENOUS at 23:55

## 2022-02-20 RX ADMIN — HYDROCODONE BITARTRATE AND ACETAMINOPHEN 1 TABLET: 5; 325 TABLET ORAL at 23:55

## 2022-02-20 RX ADMIN — KETOROLAC TROMETHAMINE 30 MG: 30 INJECTION, SOLUTION INTRAMUSCULAR at 21:02

## 2022-02-20 ASSESSMENT — ENCOUNTER SYMPTOMS
STRIDOR: 0
ABDOMINAL PAIN: 0
SHORTNESS OF BREATH: 0
NAUSEA: 0
BACK PAIN: 1
VOMITING: 0

## 2022-02-20 ASSESSMENT — PAIN SCALES - GENERAL
PAINLEVEL_OUTOF10: 7
PAINLEVEL_OUTOF10: 7

## 2022-02-21 NOTE — ED NOTES
Patient returns from MRI. Requesting additional pain medication.        Katina Mccann RN  02/20/22 1018

## 2022-02-21 NOTE — ED TRIAGE NOTES
Patient states for past several days has had lower back pain radiating down both buttocks and legs, left greater than right. Denies injury. States has had lumbar MRI in past year and was told \"discs are close to touching each other. \"  Ibuprofen taken approx. 0900 today.

## 2022-02-21 NOTE — ED PROVIDER NOTES
[address] ED  Emergency Department Encounter  Emergency Medicine Resident     Pt Name: Milda Lombard  MRN: [id number]  [place] 1990  Date of evaluation: 22  PCP:  Paolo Baltazar MD    [address] [place] [address]       Chief Complaint   Patient presents with    Back Pain       HISTORY OFPRESENT ILLNESS  (Location/Symptom, Timing/Onset, Context/Setting, Quality, Duration, Modifying Factors,Severity.)      Milda Lombard is a 28 y. o.yo male who presents with acute back pain. Patient reports that he does have a history of chronic back pain, however he had a sudden onset of lower back pain that is progressively worsened. He denies any trauma, denies any IV drug use, denies fever, chills. Patient also denies any chronic steroid usage. He states that he is having numbness or tingling down his bilateral leg, no gait abnormalities or weakness  Patient reports that he is still able to urinate without any urinary retention, no loss of bowel function. On review, patient did get MRI done on 3/2021 did demonstrate normal cauda equina, there is circumferential disc bulge around the L4-S1 region. PAST MEDICAL / SURGICAL / SOCIAL / FAMILY HISTORY      has a past medical history of Anxiety and Headache.     has no past surgical history on file. Social History     Socioeconomic History    Marital status:      Spouse name: Not on file    Number of children: Not on file    Years of education: Not on file    Highest education level: Not on file   Occupational History    Not on file   Tobacco Use    Smoking status: Former Smoker     Packs/day: 1.00     Years: 10.00     Pack years: 10.00     Types: Cigarettes     Quit date: 10/23/2016     Years since quittin.3    Smokeless tobacco: Never Used    Tobacco comment: quit oct 2016   Vaping Use    Vaping Use: Never used   Substance and Sexual Activity    Alcohol use:  Yes     Alcohol/week: 0.0 standard drinks     Comment: social    Drug tablet Take 1 tablet by mouth every 6 hours as needed for Pain for up to 3 days. Intended supply: 3 days. Take lowest dose possible to manage pain 2/20/22 2/23/22 Yes Erick Lara MD   ibuprofen (IBU) 400 MG tablet Take 2 tablets by mouth every 6 hours as needed for Pain 2/20/22  Yes Erick Lara MD   topiramate (TOPAMAX) 100 MG tablet Take 1 tablet by mouth 2 times daily 8/9/21 9/8/21  Ranelle Essex, APRN - CNP   traZODone (DESYREL) 50 MG tablet Take 2 tablets by mouth nightly as needed for Sleep 8/9/21   Ranelle Essex, APRN - CNP   butalbital-acetaminophen-caffeine (FIORICET, ESGIC) -40 MG per tablet TAKE 1 TABLET BY MOUTH EVERY 4 HOURS AS NEEDED FOR HEADACHE 8/3/21   Colette Naidu MD   FLUoxetine (PROZAC) 40 MG capsule Take 1 capsule by mouth daily 12/8/20   Ranelle Essex, APRN - CNP   SUMAtriptan (IMITREX) 100 MG tablet Take 1 tablet by mouth once as needed for Migraine 6/4/20 2/19/21  Ranelle Essex, APRN - CNP   Multiple Vitamins-Minerals (MULTIVITAMIN ADULT PO) Take by mouth    Historical Provider, MD       REVIEW OFSYSTEMS    (2-9 systems for level 4, 10 or more for level 5)      Review of Systems   Constitutional: Negative for fatigue and fever. Respiratory: Negative for shortness of breath and stridor. Cardiovascular: Negative for chest pain and leg swelling. Gastrointestinal: Negative for abdominal pain, nausea and vomiting. Genitourinary: Negative for dysuria and enuresis. Musculoskeletal: Positive for back pain. Skin: Negative for rash and wound. Neurological: Positive for numbness. PHYSICAL EXAM   (up to 7 for level 4, 8 or more forlevel 5)      INITIAL VITALS:   ED Triage Vitals [02/20/22 1947]   BP Temp Temp Source Pulse Resp SpO2 Height Weight   124/70 98.3 °F (36.8 °C) Oral 70 16 98 % 5' 10\" (1.778 m) 235 lb (106.6 kg)       Physical Exam  Constitutional:       Appearance: Normal appearance. HENT:      Head: Normocephalic and atraumatic. Mouth/Throat:      Mouth: Mucous membranes are moist.   Eyes:      Extraocular Movements: Extraocular movements intact. Pupils: Pupils are equal, round, and reactive to light. Cardiovascular:      Rate and Rhythm: Normal rate. Pulmonary:      Effort: Pulmonary effort is normal. No respiratory distress. Abdominal:      General: There is no distension. Tenderness: There is no abdominal tenderness. Musculoskeletal:         General: No swelling or tenderness. Cervical back: No rigidity or tenderness. Skin:     Capillary Refill: Capillary refill takes less than 2 seconds. Coloration: Skin is not jaundiced. Neurological:      Mental Status: He is alert. GCS: GCS eye subscore is 4. GCS verbal subscore is 5. GCS motor subscore is 6. Sensory: Sensory deficit present. Motor: No weakness or atrophy. Coordination: Coordination is intact. Gait: Gait is intact. Psychiatric:         Mood and Affect: Mood normal.         Behavior: Behavior normal.         DIFFERENTIAL  DIAGNOSIS     PLAN (LABS / IMAGING / EKG):  Orders Placed This Encounter   Procedures    CT LUMBAR SPINE WO CONTRAST    MRI LUMBAR SPINE WO CONTRAST    C-Reactive Protein    Basic Metabolic Panel    SPECIMEN REJECTION    CBC with Auto Differential    Sedimentation Rate    Measure post void residual       MEDICATIONS ORDERED:  Orders Placed This Encounter   Medications    ketorolac (TORADOL) injection 30 mg    orphenadrine (NORFLEX) injection 60 mg    HYDROcodone-acetaminophen (NORCO) 5-325 MG per tablet 1 tablet    cyclobenzaprine (FLEXERIL) 5 MG tablet     Sig: Take 1 tablet by mouth 2 times daily as needed for Muscle spasms     Dispense:  10 tablet     Refill:  0    oxyCODONE (ROXICODONE) 5 MG immediate release tablet     Sig: Take 1 tablet by mouth every 6 hours as needed for Pain for up to 3 days. Intended supply: 3 days.  Take lowest dose possible to manage pain     Dispense:  6 tablet Refill:  0    ibuprofen (IBU) 400 MG tablet     Sig: Take 2 tablets by mouth every 6 hours as needed for Pain     Dispense:  20 tablet     Refill:  0       Initial MDM/Plan: 28 y.o. male who presents with acute lumbar back pain. Patient does not appear to be in any acute distress. I did saw him ambulating without any gait abnormalities. No rashes seen. Plan for CBC, BMP, ESR sedimentation rate. Also get a post void residual.  Get CT lumbar spine to assess for any pathology. Patient given the Toradol for pain control  Plan to reassess him. DIAGNOSTIC RESULTS / EMERGENCYDEPARTMENT COURSE / MDM     LABS:  Labs Reviewed   C-REACTIVE PROTEIN - Abnormal; Notable for the following components:       Result Value    CRP 6.4 (*)     All other components within normal limits   CBC WITH AUTO DIFFERENTIAL - Abnormal; Notable for the following components:    Hematocrit 40.4 (*)     Monocytes 11 (*)     All other components within normal limits   BASIC METABOLIC PANEL   SPECIMEN REJECTION   SEDIMENTATION RATE         RADIOLOGY:  CT LUMBAR SPINE WO CONTRAST    Result Date: 2/20/2022  EXAMINATION: CT OF THE LUMBAR SPINE WITHOUT CONTRAST  2/20/2022 TECHNIQUE: CT of the lumbar spine was performed without the administration of intravenous contrast. Multiplanar reformatted images are provided for review. Adjustment of mA and/or kV according to patient size was utilized. Dose modulation, iterative reconstruction, and/or weight based adjustment of the mA/kV was utilized to reduce the radiation dose to as low as reasonably achievable. COMPARISON: Lumbar spine radiograph February 19, 2021 and MRI lumbar spine March 29, 2021 HISTORY: ORDERING SYSTEM PROVIDED HISTORY: acute lumbar pain TECHNOLOGIST PROVIDED HISTORY: acute lumbar pain Decision Support Exception - unselect if not a suspected or confirmed emergency medical condition->Emergency Medical Condition (MA) Reason for Exam: Low back pain with nerve pain down left leg.  Pt states no known recent trauma FINDINGS: BONES/ALIGNMENT: There is normal alignment of the spine. The vertebral body heights are maintained. No osseous destructive lesion is seen. DEGENERATIVE CHANGES: No significant degenerative changes of the lumbar spine. SOFT TISSUES/RETROPERITONEUM: No paraspinal mass is seen. Possible small posterior disc marginal osteophyte causing narrowing of the neural foramen at L5-S1 on the right. Possible disc marginal osteophyte causing neural foraminal narrowing L5-S1 on the right. Follow-up MRI may be helpful for further characterization. No cause for left-sided leg pain identified. MRI LUMBAR SPINE WO CONTRAST    Result Date: 2/20/2022  EXAMINATION: MRI OF THE LUMBAR SPINE WITHOUT CONTRAST, 2/20/2022 11:23 pm TECHNIQUE: Multiplanar multisequence MRI of the lumbar spine was performed without the administration of intravenous contrast. COMPARISON: March 29, 2021 HISTORY: ORDERING SYSTEM PROVIDED HISTORY: r/o cauda equina TECHNOLOGIST PROVIDED HISTORY: r/o cauda equina What is the sedation requirement?->None Decision Support Exception - unselect if not a suspected or confirmed emergency medical condition->Emergency Medical Condition (MA) Reason for Exam: Patient states for past several days has had lower back pain radiating down both buttocks and legs, left greater than right. Denies injury. States has had lumbar MRI in past year and was told \"discs are close to touching each other. \"  Pt walked into MRI room, denies urine/bowel incontinence, chronic back pain worse last couple days. FINDINGS: BONES/ALIGNMENT: There is a normal lumbar lordosis. No acute fracture or traumatic subluxation is identified. Assuming that the last well-formed disc represents L5-S1, the conus medullaris ends at L1 and is within normal limits. There is mild degenerative disc disease at L4-5 and mild to moderate degenerative disc disease at L5-S1.   High signal is present within the posterior annulus of L4-5 and L5-S1, consistent with high-intensity zones. SPINAL CORD: The conus terminates normally. SOFT TISSUES: No paraspinal mass identified. L1-L2: There is no significant disc herniation, spinal canal stenosis or neural foraminal narrowing. L2-L3: There is no significant disc herniation, spinal canal stenosis or neural foraminal narrowing. L3-L4: There is no significant disc herniation, spinal canal stenosis or neural foraminal narrowing. L4-L5: Disc bulge resulting in mild-to-moderate narrowing of the thecal sac. There is mild narrowing of the neural foramen. L5-S1: Lateralized disc bulge resulting in mild narrowing of the right neural foramen. No MRI evidence of cauda equina syndrome. Mild degenerative thecal sac narrowing and neural foraminal stenosis at L4-5 and L5-S1. EKG      All EKG's are interpreted by the Emergency Department Physicianwho either signs or Co-signs this chart in the absence of a cardiologist.    EMERGENCY DEPARTMENT COURSE:  ED Course as of 02/21/22 0017   Mon Feb 21, 2022   0016 Patient negative inflammatory markers, negative work-up, MRI was also negative for any cauda equina. Patient updated on results. Plan to discharge him with neurosurgery follow-up. Patient given pain control and muscle relaxer at discharge. Patient given strict return precaution to return to emergency room if he has worsening symptoms. He expresses understanding. [AN]      ED Course User Index  [AN] Dee Hernandez MD          PROCEDURES:  None    CONSULTS:  None    CRITICAL CARE:      FINAL IMPRESSION      1.  Spinal stenosis of lumbar region without neurogenic claudication          DISPOSITION / PLAN     DISPOSITION        PATIENT REFERRED TO:  [facility]  [address]  [address] [facility] [address]  [phone]    If symptoms worsen    Mariajose Foley MD  [address]  [phone]      As needed    Jarrod Cho MD  [address] [place] [id number]  [phone]      Please call to follow up for worsening lumbar stenosis      DISCHARGE MEDICATIONS:  Discharge Medication List as of 2/21/2022 12:04 AM      START taking these medications    Details   cyclobenzaprine (FLEXERIL) 5 MG tablet Take 1 tablet by mouth 2 times daily as needed for Muscle spasms, Disp-10 tablet, R-0Print      oxyCODONE (ROXICODONE) 5 MG immediate release tablet Take 1 tablet by mouth every 6 hours as needed for Pain for up to 3 days. Intended supply: 3 days.  Take lowest dose possible to manage pain, Disp-6 tablet, R-0Print      ibuprofen (IBU) 400 MG tablet Take 2 tablets by mouth every 6 hours as needed for Pain, Disp-20 tablet, R-0Print             Erwin Houser MD  Emergency Medicine Resident    (Please note that portions of this note were completed with a voice recognition program.Efforts were made to edit the dictations but occasionally words are mis-transcribed.)        Erwin Houser MD  Resident  02/21/22 [id number]

## 2022-02-21 NOTE — ED PROVIDER NOTES
EMERGENCY DEPARTMENT ENCOUNTER   ATTENDING ATTESTATION     Pt Name: Jose Manuel Hodges  MRN: [id number]  [place] 1990  Date of evaluation: 2/20/22       Jose Manuel Hodges is a 28 y.o. male who presents with Back Pain      MDM:   81-year-old male history of degenerative changes in the lumbar spine presenting for evaluation of low back pain over the past 2 to 3 days. Atraumatic. No fever chills. Does endorse associating bilateral radiating pain and progressing paresthesias over the bilateral thighs. No urinary symptoms or saddle anesthesias. Reports subjective weakness and trouble walking. Normal strength in bilateral lower extremities on my exam.  Diminished sensation over the lateral bilateral thighs. Postvoid residual is unremarkable. Will perform MRI to evaluate for cauda equina, conus medullaris, lumbar spine compression with patient's progressing bilateral symptoms. MRI shows no acute changes. Will have the patient follow-up with spine surgery as an outpatient. Vitals:   Vitals:    02/20/22 1947   BP: 124/70   Pulse: 70   Resp: 16   Temp: 98.3 °F (36.8 °C)   TempSrc: Oral   SpO2: 98%   Weight: 235 lb (106.6 kg)   Height: 5' 10\" (1.778 m)         I personally saw and examined the patient. I have reviewed and agree with the resident's findings, including all diagnostic interpretations and treatment plan as written. I was present for the key portions of any procedures performed and the inclusive time noted for any critical care statement. The care is provided during an unprecedented national emergency due to the novel coronavirus, COVID 19.   Leia Gibson MD  Attending Emergency Physician            Leia Gibson MD  02/21/22 44

## 2022-03-07 ENCOUNTER — OFFICE VISIT (OUTPATIENT)
Dept: NEUROLOGY | Age: 32
End: 2022-03-07
Payer: COMMERCIAL

## 2022-03-07 VITALS
BODY MASS INDEX: 32.9 KG/M2 | DIASTOLIC BLOOD PRESSURE: 82 MMHG | SYSTOLIC BLOOD PRESSURE: 124 MMHG | WEIGHT: 235 LBS | HEART RATE: 65 BPM | HEIGHT: 71 IN | TEMPERATURE: 97 F

## 2022-03-07 DIAGNOSIS — G47.33 OSA (OBSTRUCTIVE SLEEP APNEA): ICD-10-CM

## 2022-03-07 DIAGNOSIS — F41.0 PANIC ATTACKS: ICD-10-CM

## 2022-03-07 DIAGNOSIS — G43.009 MIGRAINE WITHOUT AURA AND WITHOUT STATUS MIGRAINOSUS, NOT INTRACTABLE: Primary | ICD-10-CM

## 2022-03-07 PROCEDURE — G8484 FLU IMMUNIZE NO ADMIN: HCPCS | Performed by: NURSE PRACTITIONER

## 2022-03-07 PROCEDURE — G8417 CALC BMI ABV UP PARAM F/U: HCPCS | Performed by: NURSE PRACTITIONER

## 2022-03-07 PROCEDURE — 1036F TOBACCO NON-USER: CPT | Performed by: NURSE PRACTITIONER

## 2022-03-07 PROCEDURE — 99214 OFFICE O/P EST MOD 30 MIN: CPT | Performed by: NURSE PRACTITIONER

## 2022-03-07 PROCEDURE — G8427 DOCREV CUR MEDS BY ELIG CLIN: HCPCS | Performed by: NURSE PRACTITIONER

## 2022-03-07 RX ORDER — TOPIRAMATE 100 MG/1
100 TABLET, FILM COATED ORAL 2 TIMES DAILY
Qty: 60 TABLET | Refills: 11 | Status: SHIPPED | OUTPATIENT
Start: 2022-03-07 | End: 2022-04-06

## 2022-03-07 RX ORDER — FREMANEZUMAB-VFRM 225 MG/1.5ML
225 INJECTION SUBCUTANEOUS
Qty: 1 PEN | Refills: 5 | Status: SHIPPED | OUTPATIENT
Start: 2022-03-07

## 2022-03-07 RX ORDER — RIZATRIPTAN BENZOATE 10 MG/1
10 TABLET ORAL
Qty: 12 TABLET | Refills: 11 | Status: SHIPPED | OUTPATIENT
Start: 2022-03-07 | End: 2022-03-07

## 2022-03-07 RX ORDER — TRAZODONE HYDROCHLORIDE 50 MG/1
150 TABLET ORAL NIGHTLY PRN
Qty: 60 TABLET | Refills: 11 | Status: SHIPPED | OUTPATIENT
Start: 2022-03-07

## 2022-03-07 RX ORDER — FLUOXETINE HYDROCHLORIDE 20 MG/1
20 CAPSULE ORAL DAILY
Qty: 30 CAPSULE | Refills: 5 | Status: SHIPPED | OUTPATIENT
Start: 2022-03-07

## 2022-03-07 NOTE — PROGRESS NOTES
[facility]            [address]          [facility], [address] [facility]          Dept: [phone]          Dept Fax: [phone]    E. Pierrette Jaeger, MD Adonica Barthel, MD Ahmed B. Hassan Cleaves, MD Vergie Saran, MD Manon Star, CNP            3/7/2022      HISTORY OF PRESENT ILLNESS:       I had the pleasure of seeing Dory López, who returns for continuing neurologic care. The patient was seen last on February 8, 2021 for treatment of episodes of altered awareness likely secondary to panic attacks with anxiety, migraine headaches with superimposed tension type headaches and obstructive sleep apnea which is mild. For prophylaxis of his headaches he was prescribed topamax 50 mg twice daily, prozac 40 mg daily and trazodone 50 mg daily. The patient contacted the office in August 2021 stating that he was having increased headache frequency and topamax was increased to 100 mg twice daily at that time. He is here today reporting that his headaches continued to worsen prior to today's visit and he is currently getting a daily headache for 2-3 weeks/month. These headaches are are a 8/10 in intensity. He has only had mild relief of his headaches when using imitrex for abortive therapy. Headache location: holoacranial  Headache quality: pounding, throbbing  Associated factors:  Nausea, photophobia  Intensity: 8/10  Headache chronicity: since he was a child  Headache frequency: 14 to 21 days/month  Aggravating factors : bright light  Relieving factors: partially with fioricet  Prophylactic medications: topamax, prozac, trazodone  Abortive medications: imitrex, fioricet  Previously used medications: prozac, trazodone    The patient also previously had episodes of altered awareness which were likely secondary to panic attacks associated with anxiety. The panic attacks were resolved with the use of prozac for mg daily.  He has been having episodes of \"zoning out\" while taking prozac. He previously stopped for 3-4 taking prozac for a period and \"zoning out\" which improved his awareness. He has since restarted the medication and his awareness has worsened. He also reports that when he stopped taking prozac his panic attacks had begun to worsen. He is agreeable to wean prozac at today's visit     He also has a mild obstructive sleep apnea which is mild and he has not been using a CPAP machine. For management of his obstructive sleep apnea he was prescribed trazodone 50 mg at bedtime daily. He contacted the office in August 2021 stating that trazodone was ineffective and it was increase to 100 mg at bedtime daily at this time. He reports today that the increased dose of trazodone was initially effective however he has since             Testing reviewed:    MRI brain 12/24/19         Impression   1. No acute infarct, intracranial hemorrhage, or significant mass effect.       2. No evidence of abnormal intracranial enhancement.       3. Few scattered small T2 hyperintense white matter lesions are nonspecific   in this age group.  This may represent sequela of migraines. Other etiologies   such as demyelination, or early chronic ischemic changes are not excluded but   felt to be less likely.              EEG 12/24/19  IMPRESSION:  This was a normal routine awake and drowsy EEG. There is no   epileptiform discharges or EEG seizures on this recording.            PAST MEDICAL HISTORY:         Diagnosis Date    Anxiety     Headache         PAST SURGICAL HISTORY:   History reviewed. No pertinent surgical history.      SOCIAL HISTORY:     Social History     Socioeconomic History    Marital status:      Spouse name: Not on file    Number of children: Not on file    Years of education: Not on file    Highest education level: Not on file   Occupational History    Not on file   Tobacco Use    Smoking status: Former Smoker     Packs/day: 1.00     Years: 10.00 Pack years: 10.00     Types: Cigarettes     Quit date: 10/23/2016     Years since quittin.3    Smokeless tobacco: Never Used    Tobacco comment: quit oct 2016   Vaping Use    Vaping Use: Never used   Substance and Sexual Activity    Alcohol use: Yes     Alcohol/week: 0.0 standard drinks     Comment: social    Drug use: No    Sexual activity: Yes     Partners: Female   Other Topics Concern    Not on file   Social History Narrative    Not on file     Social Determinants of Health     Financial Resource Strain:     Difficulty of Paying Living Expenses: Not on file   Food Insecurity:     Worried About Running Out of Food in the Last Year: Not on file    Emmanuelle of Food in the Last Year: Not on file   Transportation Needs:     Lack of Transportation (Medical): Not on file    Lack of Transportation (Non-Medical):  Not on file   Physical Activity:     Days of Exercise per Week: Not on file    Minutes of Exercise per Session: Not on file   Stress:     Feeling of Stress : Not on file   Social Connections:     Frequency of Communication with Friends and Family: Not on file    Frequency of Social Gatherings with Friends and Family: Not on file    Attends Quaker Services: Not on file    Active Member of [address] [place] [address] [employer] or Organizations: Not on file    Attends Club or Organization Meetings: Not on file    Marital Status: Not on file   Intimate Partner Violence:     Fear of Current or Ex-Partner: Not on file    Emotionally Abused: Not on file    Physically Abused: Not on file    Sexually Abused: Not on file   Housing Stability:     Unable to Pay for Housing in the Last Year: Not on file    Number of [place] in the Last Year: Not on file    Unstable Housing in the Last Year: Not on file       CURRENT MEDICATIONS:     Current Outpatient Medications   Medication Sig Dispense Refill    topiramate (TOPAMAX) 100 MG tablet Take 1 tablet by mouth 2 times daily 60 tablet 11    traZODone (DESYREL) 50 MG tablet Take 3 tablets by mouth nightly as needed for Sleep 60 tablet 11    Fremanezumab-vfrm (AJOVY) 225 MG/1.5ML SOAJ Inject 225 mg into the skin every 30 days 1 pen 5    rizatriptan (MAXALT) 10 MG tablet Take 1 tablet by mouth once as needed for Migraine May repeat in 2 hours if needed 12 tablet 11    FLUoxetine (PROZAC) 20 MG capsule Take 1 capsule by mouth daily 30 capsule 5    ibuprofen (IBU) 400 MG tablet Take 2 tablets by mouth every 6 hours as needed for Pain (Patient not taking: Reported on 3/7/2022) 20 tablet 0    butalbital-acetaminophen-caffeine (FIORICET, ESGIC) -40 MG per tablet TAKE 1 TABLET BY MOUTH EVERY 4 HOURS AS NEEDED FOR HEADACHE 60 tablet 1    Multiple Vitamins-Minerals (MULTIVITAMIN ADULT PO) Take by mouth (Patient not taking: Reported on 3/7/2022)       No current facility-administered medications for this visit. ALLERGIES:     Allergies   Allergen Reactions    Penicillins      All cillins                                 REVIEW OF SYSTEMS        All items selected indicate a positive finding. Those items not selected are negative.   Constitutional [] Weight loss/gain   [] Fatigue  [] Fever/Chills   HEENT [] Hearing Loss  [] Visual Disturbance  [] Tinnitus  [] Eye pain   Respiratory [] Shortness of Breath  [] Cough  [] Snoring   Cardiovascular [] Chest Pain  [] Palpitations  [] Lightheaded   GI [] Constipation  [] Diarrhea  [] Swallowing change  [] Nausea/vomiting    [] Urinary Frequency  [] Urinary Urgency   Musculoskeletal [] Neck pain  [] Back pain  [] Muscle pain  [] Restless legs   Dermatologic [] Skin changes   Neurologic [] Memory loss/confusion  [] Seizures  [] Trouble walking or imbalance  [] Dizziness  [x] Sleep disturbance  [] Weakness  [] Numbness  [] Tremors  [] Speech Difficulty  [x] Headaches  [x] Light Sensitivity  [] Sound Sensitivity   Endocrinology []Excessive thirst  []Excessive hunger   Psychiatric [x] Anxiety/Depression  [] Hallucination Allergy/immunology []Hives/environmental allergies   Hematologic/lymph [] Abnormal bleeding  [] Abnormal bruising         PHYSICAL EXAMINATION:       Vitals:    03/07/22 0718   BP: 124/82   Pulse: 65   Temp: 97 °F (36.1 °C)                                              .                                                                                                     General Appearance:  Alert, cooperative, no signs of distress, appears stated age   Head:  Normocephalic, no signs of trauma   Eyes:  Conjunctiva/corneas clear;  eyelids intact   Ears:  Normal external ear and canals   Nose: Nares normal, mucosa normal, no drainage    Throat: Lips and tongue normal; teeth normal;  gums normal   Neck: Supple, intact flexion, extension and rotation;   trachea midline;  no adenopathy;   thyroid: not enlarged;   no carotid pulse abnormality   Back:   Symmetric, no curvature, ROM adequate   Lungs:   Respirations unlabored   Heart:  Regular rate and rhythm           Extremities: Extremities normal, no cyanosis, no edema   Pulses: Symmetric over head and neck   Skin: Skin color, texture normal, no rashes, no lesions                                     NEUROLOGIC EXAMINATION    Neurologic Exam  Mental status    Alert and oriented x 3; intact memory with no confusion, speech or language problems; no hallucinations or delusions  Fund of information appropriate for level of education    Cranial nerves    II - visual fields intact to confrontation bilaterally  III, IV, VI - extra-ocular muscles full: no pupillary defect; no LEONIDAS, no nystagmus, no ptosis   V - normal facial sensation                                                               VII - normal facial symmetry                                                             VIII - intact hearing                                                                             IX, X - symmetrical palate                                                                  XI - symmetrical shoulder shrug                                                       XII - tongue midline without atrophy or fasciculation      Motor function  Normal muscle bulk and tone; strength 5/5 on all 4 extremities, no pronator drift      Sensory function Intact to light touch, pinprick, vibration, proprioception on all 4 extremities      Cerebellar Intact fine motor movement. No involuntary movements or tremors. No ataxia or dysmetria on finger to nose or heel to shin testing      Reflex function DTR 2+ on bilateral UE and LE, symmetric. Down going toes bilaterally      Gait                   normal base and arm swing                  Medical Decision Making: In summary, your patient, Sania Reddy exhibits the following, with associated plan:    1. Episodes of altered awareness which are resolved.  The results were likely secondary to panic attacks associated with anxiety. He has recently been having decreased awareness associated with prozac which improved when he stopped taking the medication  1. Wean prozac to 20 mg daily consider changing to Lexapro, depending on how the reduced dosage makes him feel  2. Migraine headache/tension type headaches which have worsened currently getting 14-21 headache days/month. Previous medications include trazodone, prozac and topamax. 1. Start ajovy 225 mg injections every 30 days   2. Continue Topamax 100 mg twice daily  3. Increase trazodone to 150 mg at bedtime daily  4. Prozac as above  5. Start maxalt 10 mg for abortive therapy  6. Continue Fioricet as rescue  3. Obstructive sleep apnea which is mild.  He is not using CPAP as it was not helping his sleep. The patient has been waking up frequently and having difficulty going back to sleep  1.  Increase to trazodone to 150 mg at bedtime daily            Signed: Chery Gautam CNP      *Please note that portions of this note were completed with a voice recognition program.  Although every effort was made to insure the accuracy of this automated transcription, some errors in transcription may have occurred, occasionally words and are mis-transcribed    Provider Attestation: The documentation recorded by the scribe accurately reflects the service I personally performed and the decisions made by myself. Portions of this note were transcribed by a scribe. I personally performed the history, physical exam, and medical decision-making and confirm the accuracy of the information in the transcribed note. Scribe Attestation:   By signing my name below, Claritza Gautam, attest that this documentation has been prepared under the direction and in the presence of Raul Wood CNP.

## 2022-03-15 ENCOUNTER — TELEPHONE (OUTPATIENT)
Dept: NEUROLOGY | Age: 32
End: 2022-03-15

## 2022-07-01 ENCOUNTER — APPOINTMENT (OUTPATIENT)
Dept: GENERAL RADIOLOGY | Age: 32
End: 2022-07-01
Payer: COMMERCIAL

## 2022-07-01 ENCOUNTER — HOSPITAL ENCOUNTER (EMERGENCY)
Age: 32
Discharge: HOME | End: 2022-07-01
Attending: STUDENT IN AN ORGANIZED HEALTH CARE EDUCATION/TRAINING PROGRAM
Payer: COMMERCIAL

## 2022-07-01 VITALS
OXYGEN SATURATION: 96 % | HEIGHT: 71 IN | BODY MASS INDEX: 32.9 KG/M2 | SYSTOLIC BLOOD PRESSURE: 148 MMHG | TEMPERATURE: 98.1 F | DIASTOLIC BLOOD PRESSURE: 81 MMHG | RESPIRATION RATE: 18 BRPM | WEIGHT: 235 LBS | HEART RATE: 69 BPM

## 2022-07-01 DIAGNOSIS — M77.11 LATERAL EPICONDYLITIS OF RIGHT ELBOW: Primary | ICD-10-CM

## 2022-07-01 PROCEDURE — 99283 EMERGENCY DEPT VISIT LOW MDM: CPT

## 2022-07-01 PROCEDURE — 73080 X-RAY EXAM OF ELBOW: CPT

## 2022-07-01 ASSESSMENT — PAIN DESCRIPTION - ORIENTATION: ORIENTATION: RIGHT

## 2022-07-01 ASSESSMENT — ENCOUNTER SYMPTOMS
DIARRHEA: 0
NAUSEA: 0
SHORTNESS OF BREATH: 0
FACIAL SWELLING: 0
EYE PAIN: 0
SORE THROAT: 0
COUGH: 0
COLOR CHANGE: 0
EYE REDNESS: 0
ABDOMINAL PAIN: 0
RHINORRHEA: 0
VOMITING: 0
BACK PAIN: 0

## 2022-07-01 ASSESSMENT — PAIN DESCRIPTION - LOCATION: LOCATION: ELBOW

## 2022-07-01 ASSESSMENT — LIFESTYLE VARIABLES
HOW OFTEN DO YOU HAVE A DRINK CONTAINING ALCOHOL: 2-4 TIMES A MONTH
HOW MANY STANDARD DRINKS CONTAINING ALCOHOL DO YOU HAVE ON A TYPICAL DAY: 7 TO 9

## 2022-07-01 ASSESSMENT — PAIN DESCRIPTION - FREQUENCY: FREQUENCY: CONTINUOUS

## 2022-07-01 ASSESSMENT — PAIN DESCRIPTION - ONSET: ONSET: ON-GOING

## 2022-07-01 ASSESSMENT — PAIN DESCRIPTION - DESCRIPTORS: DESCRIPTORS: SHARP;SHOOTING

## 2022-07-01 ASSESSMENT — PAIN SCALES - GENERAL: PAINLEVEL_OUTOF10: 7

## 2022-07-01 ASSESSMENT — PAIN DESCRIPTION - PAIN TYPE: TYPE: ACUTE PAIN

## 2022-07-01 ASSESSMENT — PAIN - FUNCTIONAL ASSESSMENT
PAIN_FUNCTIONAL_ASSESSMENT: ACTIVITIES ARE NOT PREVENTED
PAIN_FUNCTIONAL_ASSESSMENT: 0-10

## 2022-07-02 NOTE — ED PROVIDER NOTES
Excessive daytime sleepiness G47.19    ELIZABETH (obstructive sleep apnea) G47.33     SURGICAL HISTORY     History reviewed. No pertinent surgical history. CURRENT MEDICATIONS       Discharge Medication List as of 2022  9:14 PM      CONTINUE these medications which have NOT CHANGED    Details   topiramate (TOPAMAX) 100 MG tablet Take 1 tablet by mouth 2 times daily, Disp-60 tablet, R-11Normal      traZODone (DESYREL) 50 MG tablet Take 3 tablets by mouth nightly as needed for Sleep, Disp-60 tablet, R-11Normal      Fremanezumab-vfrm (AJOVY) 225 MG/1.5ML SOAJ Inject 225 mg into the skin every 30 days, Disp-1 pen, R-5Normal      rizatriptan (MAXALT) 10 MG tablet Take 1 tablet by mouth once as needed for Migraine May repeat in 2 hours if needed, Disp-12 tablet, R-11Normal      FLUoxetine (PROZAC) 20 MG capsule Take 1 capsule by mouth daily, Disp-30 capsule, R-5Normal      ibuprofen (IBU) 400 MG tablet Take 2 tablets by mouth every 6 hours as needed for Pain, Disp-20 tablet, R-0Print      butalbital-acetaminophen-caffeine (FIORICET, ESGIC) -40 MG per tablet TAKE 1 TABLET BY MOUTH EVERY 4 HOURS AS NEEDED FOR HEADACHE, Disp-60 tablet, R-1Normal      Multiple Vitamins-Minerals (MULTIVITAMIN ADULT PO) Take by mouthHistorical Med           ALLERGIES     is allergic to penicillins. FAMILY HISTORY     He indicated that the status of his father is unknown. He indicated that the status of his maternal grandmother is unknown. He indicated that the status of his maternal grandfather is unknown. SOCIAL HISTORY       Social History     Tobacco Use    Smoking status: Former Smoker     Packs/day: 1.00     Years: 10.00     Pack years: 10.00     Types: Cigarettes     Quit date: 10/23/2016     Years since quittin.6    Smokeless tobacco: Never Used    Tobacco comment: quit oct 2016   Vaping Use    Vaping Use: Never used   Substance Use Topics    Alcohol use:  Yes     Alcohol/week: 0.0 standard drinks     Comment: social  Drug use: No     PHYSICAL EXAM     INITIAL VITALS: BP (!) 148/81   Pulse 69   Temp 98.1 °F (36.7 °C) (Oral)   Resp 18   Ht 5' 11\" (1.803 m)   Wt 235 lb (106.6 kg)   SpO2 96%   BMI 32.78 kg/m²    Physical Exam  Vitals and nursing note reviewed. Constitutional:       Appearance: Normal appearance. HENT:      Head: Normocephalic and atraumatic. Nose: Nose normal.   Eyes:      Extraocular Movements: Extraocular movements intact. Pupils: Pupils are equal, round, and reactive to light. Cardiovascular:      Rate and Rhythm: Normal rate and regular rhythm. Pulmonary:      Effort: Pulmonary effort is normal. No respiratory distress. Breath sounds: Normal breath sounds. Abdominal:      General: Abdomen is flat. There is no distension. Palpations: Abdomen is soft. There is no mass. Musculoskeletal:         General: No swelling. Normal range of motion. Cervical back: Normal range of motion. No rigidity. Comments: Discomfort with palpation of right proximal forearm. Full ROM at the elbow. Neurovascularly intact. Soft compartments. No visible swelling or overlying skin changes. No cervical spine discomfort   Skin:     General: Skin is warm and dry. Neurological:      General: No focal deficit present. Mental Status: He is alert. Mental status is at baseline. Cranial Nerves: No cranial nerve deficit. MEDICAL DECISION MAKIN-year-old male presents for evaluation with about 6 weeks of progressive right elbow pain. X-rays obtained in the ER tonight are negative. Patient's history and physical exam is consistent with a lateral epicondylitis. Do not suspect infection. Will discharge with orthopedics follow-up.          CRITICAL CARE:       PROCEDURES:    Procedures    DIAGNOSTIC RESULTS   EKG:All EKG's are interpreted by the Emergency Department Physician who either signs or Co-signs this chart in the absence of a cardiologist.        RADIOLOGY:All plain film, CT, MRI, and formal ultrasound images (except ED bedside ultrasound) are read by the radiologist, see reports below, unless otherwisenoted in MDM or here. XR ELBOW RIGHT (MIN 3 VIEWS)   Final Result   Negative right elbow. LABS: All lab results were reviewed by myself, and all abnormals are listed below. Labs Reviewed - No data to display    EMERGENCY DEPARTMENTCOURSE:         Vitals:    Vitals:    07/01/22 2029   BP: (!) 148/81   Pulse: 69   Resp: 18   Temp: 98.1 °F (36.7 °C)   TempSrc: Oral   SpO2: 96%   Weight: 235 lb (106.6 kg)   Height: 5' 11\" (1.803 m)       The patient was given the following medications while in the emergency department:  No orders of the defined types were placed in this encounter. CONSULTS:  None    FINAL IMPRESSION      1. Lateral epicondylitis of right elbow          DISPOSITION/PLAN   DISPOSITION Decision To Discharge 07/01/2022 09:13:01 PM      PATIENT REFERRED TO:  Murali Carrillo MD  [address] [place] [address] [id number] [phone]    Call in 3 days      DISCHARGE MEDICATIONS:  Discharge Medication List as of 7/1/2022  9:14 PM        The care is provided during an unprecedented national emergency due to the novel coronavirus, COVID 19.   MD Jostin Hughes MD  07/02/22 6419

## 2022-07-29 ENCOUNTER — OFFICE VISIT (OUTPATIENT)
Dept: ORTHOPEDIC SURGERY | Age: 32
End: 2022-07-29
Payer: COMMERCIAL

## 2022-07-29 VITALS — HEIGHT: 71 IN | WEIGHT: 235 LBS | RESPIRATION RATE: 14 BRPM | BODY MASS INDEX: 32.9 KG/M2

## 2022-07-29 DIAGNOSIS — M77.11 LATERAL EPICONDYLITIS OF RIGHT ELBOW: Primary | ICD-10-CM

## 2022-07-29 PROCEDURE — 99203 OFFICE O/P NEW LOW 30 MIN: CPT | Performed by: ORTHOPAEDIC SURGERY

## 2022-07-29 PROCEDURE — 1036F TOBACCO NON-USER: CPT | Performed by: ORTHOPAEDIC SURGERY

## 2022-07-29 PROCEDURE — G8417 CALC BMI ABV UP PARAM F/U: HCPCS | Performed by: ORTHOPAEDIC SURGERY

## 2022-07-29 PROCEDURE — G8427 DOCREV CUR MEDS BY ELIG CLIN: HCPCS | Performed by: ORTHOPAEDIC SURGERY

## 2022-07-31 NOTE — PROGRESS NOTES
Orthopedic Elbow Encounter Note     Chief complaint: right elbow pain    HPI: William Glass is a 28 y.o. right-hand-dominant male presenting for evaluation of his right elbow. He has been having pain for about 2-1/2 months without precipitating trauma or injury. His pain is constant and primarily localized to the lateral aspect of the elbow. It radiates down into his hand and now up to the shoulder. 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 He denies having any associated numbness or tingling. He is attempted treatment with use of over-the-counter anti-inflammatories and a counterforce strap without significant improvement. Previous treatment:    NSAIDs: Motrin    Injections: No    Physical therapy: No    Surgeries: None    Review of Systems:     Constitution: no fever or chills   Pain level: 10/10  Musculoskeletal: As noted in the HPI   Neurologic: no neurologic symptoms    Past Medical Hx, Past Surgical Hx, Medications, Allergies, Social Hx: These were all reviewed. Please refer to Electronic Medical Records for details.      Physical Exam:     Resp 14   Ht 5' 11\" (1.803 m)   Wt 235 lb (106.6 kg)   BMI 32.78 kg/m²    General Appearance: alert, well appearing, and in no distress  Mental Status: alert, oriented to person, place, and time    Elbows:    Skin: warm and dry, no rash or erythema  Vasculature: 2+ radial pulses bilaterally  Sensation: Intact to light touch in radial, ulnar, and median nerve distributions bilaterally    ROM: (Degrees)    Right   A  Left   A     Flexion   135  Flexion   135   Extension  0  Extension  0    Pronation  65  Pronaton  65   Supination  75  Supination  75     Crepitation  No  Crepitation  No    Muscle strength:    Right       Left    Biceps   5    Biceps   5  Triceps   5    Triceps   5  Wrist flexion  5    Wrist flexion  5  Wrist extension 5    Wrist extension 5  Intrinsics  5    Intrinsics  5    Tenderness: Lateral epicondyle   Tenderness: None    Special tests    Right    Ulnar Nerve     Left  n    Cubital tunnel bend    n  n    Tinnel's at elbow    n        Biceps  n    Bicipital tenderness    n  n    Defect at biceps insertion   n        Instability  n    LCL instability     n  n    MCL instability     n  n    Moving valgus test    n  n    Milk sign     n  n    PLRI pivot     n        Epicondylitis  y    Resisted WE Pain    n  n    Resisted WF Pain    n  n    Resisted Supination Pain   n  n    Resisted Pronation Pain   n        Arthritis  n    Clunk      n  n    Pain at extremes of motion   n  n    Pain during arc of motion   n      Imaging:  X-rays of the right elbow completed on 7/1/2022 were viewed independently demonstrating normal joint space without obvious fracture, dislocation, subluxation or notable osseous abnormality. Impression/Plan:     Marcia Webb is a 28 y.o. old male with right elbow pain that is consistent with lateral epicondylitis. I had a discussion with the patient today with regards to the nature and extent of his problem. We discussed treatment options available to him. I recommend proceeding with conservative management involving icing, bracing with a wrist control splint, use of a topical anti-inflammatory, and a home stretching and eccentric strengthening program. he was provided with this program, he was instructed to obtain Voltaren gel over-the-counter applying it 4 times a day to the lateral epicondyle region, and he was provided with a wrist control splint. he is to wear the splint during the day while active. I'll have him follow up in my clinic as needed. He was encouraged to return or call at anytime with persistent or worsening symptoms.       This note is created with the assistance of a speech recognition program.  While intending to generate adocument that actually reflects the content of the visit, the document can still have some errors including those of syntax and sound a like substitutions which may escape proof reading. It such instances, actual meaningcan be extrapolated by contextual diversion.     NA = Not assessed  y = Yes  n = No

## 2022-11-07 ENCOUNTER — TELEPHONE (OUTPATIENT)
Dept: NEUROLOGY | Age: 32
End: 2022-11-07

## 2022-11-07 DIAGNOSIS — F41.0 PANIC ATTACKS: Primary | ICD-10-CM

## 2022-11-07 DIAGNOSIS — G47.33 OSA (OBSTRUCTIVE SLEEP APNEA): ICD-10-CM

## 2022-11-07 NOTE — TELEPHONE ENCOUNTER
Pt called in stating that he has been trying to get his Topamax, Trazodone and Prozac filled. [facility] is saying that he is out of refills. I looked in his chart and saw that we had sent in a year supply of both his Topamax and Trazodone on 3/7/22, and a 6 month supply of his Prozac on the same date. I stated that we could get the Prozac sent in. He said that the 20 mg was causing him more anxiety so he has been having to take the 40 mg again. He was wondering if we could increase back to the 40 mg? Also he was unsure of when he last got the Trazodone filled. He said that he was sure he could get the Topamax filled though. I called [facility] and spoke with Carleen Gottlieb. She pulled up his file and said they had used all of the refills on the Trazodone, the qty was only written for #60 so they used the refills quicker to fill for the written sig of TID. He does have refill remaining on his Topamax, they will get that ready for him. Please advise about the Prozac dose and then I can get both meds ready for refills.

## 2022-11-11 RX ORDER — FLUOXETINE HYDROCHLORIDE 20 MG/1
20 CAPSULE ORAL DAILY
Qty: 30 CAPSULE | Refills: 0 | Status: SHIPPED | OUTPATIENT
Start: 2022-11-11 | End: 2022-11-29 | Stop reason: ALTCHOICE

## 2022-11-11 RX ORDER — TRAZODONE HYDROCHLORIDE 50 MG/1
150 TABLET ORAL NIGHTLY PRN
Qty: 90 TABLET | Refills: 0 | Status: SHIPPED | OUTPATIENT
Start: 2022-11-11 | End: 2022-12-11

## 2022-11-11 NOTE — TELEPHONE ENCOUNTER
Can you please send in both scripts for the pt, as we did not get a reply before Reggie Rueda went on vacation. Thanks.

## 2022-11-21 NOTE — TELEPHONE ENCOUNTER
Maryjane Collazo called the office this morning to check to see if the Prozac dose could be increased back to 40 mg, as per previous 11/7/22 note. I let him know that I would send this to Quynh Stephens, however she was out of the office today. Patient verbally stated his understanding.

## 2022-11-29 RX ORDER — FLUOXETINE HYDROCHLORIDE 40 MG/1
40 CAPSULE ORAL DAILY
Qty: 30 CAPSULE | Refills: 3 | Status: SHIPPED | OUTPATIENT
Start: 2022-11-29

## 2022-12-01 RX ORDER — TOPIRAMATE 100 MG/1
100 TABLET, FILM COATED ORAL 2 TIMES DAILY
Qty: 60 TABLET | Refills: 2 | Status: SHIPPED | OUTPATIENT
Start: 2022-12-01 | End: 2022-12-31

## 2022-12-01 NOTE — TELEPHONE ENCOUNTER
Pharmacy requesting refill of Topiramate 100 mg .       Medication active on med list yes      Date of last Rx: 03/07/22 with 11 refills          verified by ALEXEY MOLINA      Date of last appointment 03/07/22    Next Visit Date:  3/16/2023    Can you please sign for [facility] LAMINE

## 2022-12-01 NOTE — TELEPHONE ENCOUNTER
Call placed to the patient and a generic message left on his VM that Thiago Abbott did send a new Rx to his pharmacy. I asked that he call if there were any problems.

## 2023-03-16 ENCOUNTER — OFFICE VISIT (OUTPATIENT)
Dept: NEUROLOGY | Age: 33
End: 2023-03-16
Payer: COMMERCIAL

## 2023-03-16 VITALS
WEIGHT: 253 LBS | HEIGHT: 71 IN | HEART RATE: 73 BPM | SYSTOLIC BLOOD PRESSURE: 122 MMHG | BODY MASS INDEX: 35.42 KG/M2 | DIASTOLIC BLOOD PRESSURE: 79 MMHG

## 2023-03-16 DIAGNOSIS — R51.9 PRESSURE IN HEAD: Primary | ICD-10-CM

## 2023-03-16 DIAGNOSIS — F41.0 PANIC ATTACKS: ICD-10-CM

## 2023-03-16 DIAGNOSIS — G47.33 OSA (OBSTRUCTIVE SLEEP APNEA): ICD-10-CM

## 2023-03-16 DIAGNOSIS — G43.009 MIGRAINE WITHOUT AURA AND WITHOUT STATUS MIGRAINOSUS, NOT INTRACTABLE: ICD-10-CM

## 2023-03-16 DIAGNOSIS — R04.0 MILD EPISTAXIS: ICD-10-CM

## 2023-03-16 PROCEDURE — 1036F TOBACCO NON-USER: CPT | Performed by: NURSE PRACTITIONER

## 2023-03-16 PROCEDURE — G8427 DOCREV CUR MEDS BY ELIG CLIN: HCPCS | Performed by: NURSE PRACTITIONER

## 2023-03-16 PROCEDURE — 99214 OFFICE O/P EST MOD 30 MIN: CPT | Performed by: NURSE PRACTITIONER

## 2023-03-16 PROCEDURE — G8484 FLU IMMUNIZE NO ADMIN: HCPCS | Performed by: NURSE PRACTITIONER

## 2023-03-16 PROCEDURE — G8417 CALC BMI ABV UP PARAM F/U: HCPCS | Performed by: NURSE PRACTITIONER

## 2023-03-16 RX ORDER — ACETAMINOPHEN 500 MG
500 TABLET ORAL EVERY 6 HOURS PRN
COMMUNITY

## 2023-03-16 RX ORDER — FLUOXETINE HYDROCHLORIDE 40 MG/1
40 CAPSULE ORAL DAILY
Qty: 30 CAPSULE | Refills: 11 | Status: SHIPPED | OUTPATIENT
Start: 2023-03-16

## 2023-03-16 RX ORDER — TOPIRAMATE 100 MG/1
100 TABLET, FILM COATED ORAL 2 TIMES DAILY
Qty: 60 TABLET | Refills: 2 | Status: SHIPPED | OUTPATIENT
Start: 2023-03-16 | End: 2023-04-15

## 2023-03-16 RX ORDER — TRAZODONE HYDROCHLORIDE 150 MG/1
150 TABLET ORAL NIGHTLY
Qty: 30 TABLET | Refills: 11 | Status: SHIPPED | OUTPATIENT
Start: 2023-03-16 | End: 2023-04-15

## 2023-03-16 NOTE — PROGRESS NOTES
[facility]            [address]          [facility], [address]          Dept: [phone]          Dept Fax: [phone]    E. Joli Langdon, MD Ahmed B. Danney Goes, MD Halina Saint, MD Guillermo Peck, CNP            3/16/2023      HISTORY OF PRESENT ILLNESS:       I had the pleasure of seeing Chemo Novak, who returns for continuing neurologic care. The patient was seen last on March 7, 202 for treatment of episodes of altered awareness likely secondary to panic attacks with anxiety, migraine headaches with superimposed tension type headaches and obstructive sleep apnea which is mild. The patient had episodes of altered awareness likely secondary to anxiety with panic attacks and was prescribed prozac 20 mg daily. He contacted the office in November 2022 requesting to increase prozac to 40 mg daily. His anxiety has improved with the increased dose of prozac. For migraine prophylaxis he was prescribed ajovy 225 mg injections every 30 days, topamax 100 mg twice daily, trazodone 150 mg nightly and prozac 40 mg daily. For abortive therapy he was prescribed maxalt and fioricet. He reports today that he has been having headaches which are not associated with any migrainous features. He notes that his migraine headaches have remained well controlled. He is currently having 2-3 headaches/week  tension headache which is worsened by positional changes. He notes frequent episodes of epistaxis which has been present for several years. His episodes of epistaxis have recently increased in frequency which can be triggered by lightly blowing his nose. He did not receive ajovy from his pharmacy.      Headache location: bifrontal, holoacranial  Headache quality: pounding, throbbing  Associated factors:  Nausea, photophobia  Intensity: 8/10  Headache chronicity: since he was a child  Headache frequency: 2-3/week, not migraines  Aggravating factors : bright light  Relieving factors: partially with fioricet  Prophylactic medications: topamax, prozac, trazodone  Abortive medications: imitrex, fioricet  Previously used medications: prozac, trazodone    He also has a mild obstructive sleep apnea and does not use CPAP as it provided him with no improvements. For management he was prescribed trazodone 150 mg nightly. He continued having difficulties staying asleep despite the increased dose of         Testing reviewed:    MRI brain 19         Impression   1. No acute infarct, intracranial hemorrhage, or significant mass effect. 2. No evidence of abnormal intracranial enhancement. 3. Few scattered small T2 hyperintense white matter lesions are nonspecific   in this age group. This may represent sequela of migraines. Other etiologies   such as demyelination, or early chronic ischemic changes are not excluded but   felt to be less likely. EEG 19  IMPRESSION:  This was a normal routine awake and drowsy EEG. There is no   epileptiform discharges or EEG seizures on this recording. PAST MEDICAL HISTORY:         Diagnosis Date    Anxiety     Headache         PAST SURGICAL HISTORY:   No past surgical history on file. SOCIAL HISTORY:     Social History     Socioeconomic History    Marital status:      Spouse name: Not on file    Number of children: Not on file    Years of education: Not on file    Highest education level: Not on file   Occupational History    Not on file   Tobacco Use    Smoking status: Former     Packs/day: 1.00     Years: 10.00     Pack years: 10.00     Types: Cigarettes     Quit date: 10/23/2016     Years since quittin.3    Smokeless tobacco: Never    Tobacco comments:     quit oct 2016   Vaping Use    Vaping Use: Never used   Substance and Sexual Activity    Alcohol use:  Yes     Alcohol/week: 0.0 standard drinks     Comment: social    Drug use: No    Sexual activity: Yes     Partners: Female   Other Topics Concern    Not on file   Social History Narrative    Not on file     Social Determinants of Health     Financial Resource Strain: Not on file   Food Insecurity: Not on file   Transportation Needs: Not on file   Physical Activity: Not on file   Stress: Not on file   Social Connections: Not on file   Intimate Partner Violence: Not on file   Housing Stability: Not on file       CURRENT MEDICATIONS:     Current Outpatient Medications   Medication Sig Dispense Refill    acetaminophen (TYLENOL) 500 MG tablet Take 500 mg by mouth every 6 hours as needed for Pain      FLUoxetine (PROZAC) 40 MG capsule Take 1 capsule by mouth daily 30 capsule 11    topiramate (TOPAMAX) 100 MG tablet Take 1 tablet by mouth 2 times daily 60 tablet 2    traZODone (DESYREL) 150 MG tablet Take 1 tablet by mouth nightly 30 tablet 11    ibuprofen (IBU) 400 MG tablet Take 2 tablets by mouth every 6 hours as needed for Pain (Patient taking differently: Take 200 mg by mouth every 6 hours as needed for Pain Four tablets as needed) 20 tablet 0    butalbital-acetaminophen-caffeine (FIORICET, ESGIC) -40 MG per tablet TAKE 1 TABLET BY MOUTH EVERY 4 HOURS AS NEEDED FOR HEADACHE (Patient not taking: Reported on 3/16/2023) 60 tablet 1    Multiple Vitamins-Minerals (MULTIVITAMIN ADULT PO) Take by mouth (Patient not taking: No sig reported)       No current facility-administered medications for this visit. ALLERGIES:     Allergies   Allergen Reactions    Penicillins      All cillins                                 REVIEW OF SYSTEMS        All items selected indicate a positive finding. Those items not selected are negative.   Constitutional [] Weight loss/gain   [] Fatigue  [] Fever/Chills   HEENT [] Hearing Loss  [] Visual Disturbance  [] Tinnitus  [] Eye pain   Respiratory [] Shortness of Breath  [] Cough  [] Snoring   Cardiovascular [] Chest Pain  [] Palpitations  [] Lightheaded   GI [] Constipation  [] Diarrhea  [] Swallowing change  [] Nausea/vomiting    [] Urinary Frequency  [] Urinary Urgency   Musculoskeletal [] Neck pain  [] Back pain  [] Muscle pain  [] Restless legs   Dermatologic [] Skin changes   Neurologic [] Memory loss/confusion  [] Seizures  [] Trouble walking or imbalance  [] Dizziness  [x] Sleep disturbance  [] Weakness  [] Numbness  [] Tremors  [] Speech Difficulty  [x] Headaches  [] Light Sensitivity  [] Sound Sensitivity   Endocrinology []Excessive thirst  []Excessive hunger   Psychiatric [x] Anxiety/Depression  [] Hallucination   Allergy/immunology []Hives/environmental allergies   Hematologic/lymph [] Abnormal bleeding  [] Abnormal bruising         PHYSICAL EXAMINATION:       Vitals:    03/16/23 0904   BP: 122/79   Pulse: 73                                              .                                                                                                    General Appearance:  Alert, cooperative, no signs of distress, appears stated age   Head:  Normocephalic, no signs of trauma   Eyes:  Conjunctiva/corneas clear;  eyelids intact   Ears:  Normal external ear and canals   Nose: Nares normal, mucosa normal, no drainage    Throat: Lips and tongue normal; teeth normal;  gums normal   Neck: Supple, intact flexion, extension and rotation;   trachea midline;  no adenopathy;   thyroid: not enlarged;   no carotid pulse abnormality   Back:   Symmetric, no curvature, ROM adequate   Lungs:   Respirations unlabored   Heart:  Regular rate and rhythm           Extremities: Extremities normal, no cyanosis, no edema   Pulses: Symmetric over head and neck   Skin: Skin color, texture normal, no rashes, no lesions                                     NEUROLOGIC EXAMINATION    Neurologic Exam  Mental status    Alert and oriented x 3; intact memory with no confusion, speech or language problems; no hallucinations or delusions  Fund of information appropriate for level of education    Cranial nerves    II - visual fields intact to confrontation bilaterally  III, IV, VI - extra-ocular muscles full: no pupillary defect; no LEONIDAS, no nystagmus, no ptosis   V - normal facial sensation                                                               VII - normal facial symmetry                                                             VIII - intact hearing                                                                             IX, X - symmetrical palate                                                                  XI - symmetrical shoulder shrug                                                       XII - tongue midline without atrophy or fasciculation      Motor function  Normal muscle bulk and tone; strength 5/5 on all 4 extremities, no pronator drift      Sensory function Intact to light touch, pinprick, vibration, proprioception on all 4 extremities      Cerebellar Intact fine motor movement. No involuntary movements or tremors. No ataxia or dysmetria on finger to nose or heel to shin testing      Reflex function DTR 2+ on bilateral UE and LE, symmetric. Down going toes bilaterally      Gait                   normal base and arm swing                  Medical Decision Making: In summary, your patient, Mario Rocha exhibits the following, with associated plan:    Episodes of altered awareness which are likely secondary to panic attacks associated with anxiety. Continue prozac 40 mg daily   Migraine headache with superimposed tension type headaches which have worsened currently having 2-3 headache days/week. He is currently having bifrontal pressure headaches which are not associated with any migrainous features. He also notes frequent episodes of epistaxis and sinus pressure which may contribute to his headaches. Previous medications include trazodone, prozac and topamax.    Continue Topamax 100 mg twice daily  Continue trazodone 150 mg nightly  Prozac as above  Continue Fioricet as rescue  CT sinuses ordered today to assess his frequent episodes of epistaxis  Referral to ENT ordered today for evaluation of his frequent episodes of epistaxis and sinus pressure was   Discussed use of flonase, claritin and Netti pot to improve his sinus pressure   Obstructive sleep apnea which is mild. He is not using CPAP as it was not helping his sleep. The patient has been waking up frequently and having difficulty going back to sleep  Continue trazodone 150 mg nightly  Recommended he begin taking OTC melatonin at 6 mg daily in combination with trazodone  Return for follow up visit in 6 weeks with Dr. Shi Hawk            Signed: Ana Sheffield CNP      *Please note that portions of this note were completed with a voice recognition program.  Although every effort was made to insure the accuracy of this automated transcription, some errors in transcription may have occurred, occasionally words and are mis-transcribed    Provider Attestation: The documentation recorded by the scribe accurately reflects the service I personally performed and the decisions made by myself. Portions of this note were transcribed by a scribe. I personally performed the history, physical exam, and medical decision-making and confirm the accuracy of the information in the transcribed note. Scribe Attestation:   By signing my name below, Alyssa Villagran, attest that this documentation has been prepared under the direction and in the presence of Ana Sheffield CNP.

## 2023-03-24 ENCOUNTER — HOSPITAL ENCOUNTER (OUTPATIENT)
Dept: CT IMAGING | Age: 33
Discharge: HOME | End: 2023-03-24
Payer: COMMERCIAL

## 2023-03-24 DIAGNOSIS — R04.0 MILD EPISTAXIS: ICD-10-CM

## 2023-03-24 DIAGNOSIS — R51.9 PRESSURE IN HEAD: ICD-10-CM

## 2023-03-24 PROCEDURE — 70486 CT MAXILLOFACIAL W/O DYE: CPT

## 2023-05-11 ENCOUNTER — APPOINTMENT (OUTPATIENT)
Dept: CT IMAGING | Age: 33
End: 2023-05-11
Payer: COMMERCIAL

## 2023-05-11 ENCOUNTER — APPOINTMENT (OUTPATIENT)
Dept: GENERAL RADIOLOGY | Age: 33
End: 2023-05-11
Payer: COMMERCIAL

## 2023-05-11 ENCOUNTER — HOSPITAL ENCOUNTER (EMERGENCY)
Age: 33
Discharge: HOME | End: 2023-05-12
Attending: STUDENT IN AN ORGANIZED HEALTH CARE EDUCATION/TRAINING PROGRAM
Payer: COMMERCIAL

## 2023-05-11 DIAGNOSIS — K52.9 COLITIS: ICD-10-CM

## 2023-05-11 DIAGNOSIS — R10.11 RIGHT UPPER QUADRANT ABDOMINAL PAIN: Primary | ICD-10-CM

## 2023-05-11 LAB
ABSOLUTE EOS #: 0.2 K/UL (ref 0–0.4)
ABSOLUTE LYMPH #: 4 K/UL (ref 1–4.8)
ABSOLUTE MONO #: 1 K/UL (ref 0.1–1.3)
BASOPHILS # BLD: 1 % (ref 0–2)
BASOPHILS ABSOLUTE: 0.1 K/UL (ref 0–0.2)
BILIRUBIN URINE: NEGATIVE
COLOR: YELLOW
COMMENT UA: ABNORMAL
EOSINOPHILS RELATIVE PERCENT: 2 % (ref 0–4)
GLUCOSE UR STRIP.AUTO-MCNC: NEGATIVE MG/DL
HCT VFR BLD AUTO: 41.2 % (ref 41–53)
HGB BLD-MCNC: 14.7 G/DL (ref 13.5–17.5)
INR PPP: 1.1
KETONES UR STRIP.AUTO-MCNC: ABNORMAL MG/DL
LEUKOCYTE ESTERASE UR QL STRIP.AUTO: NEGATIVE
LYMPHOCYTES # BLD: 45 % (ref 24–44)
MCH RBC QN AUTO: 29.6 PG (ref 26–34)
MCHC RBC AUTO-ENTMCNC: 35.6 G/DL (ref 31–37)
MCV RBC AUTO: 83.2 FL (ref 80–100)
MONOCYTES # BLD: 11 % (ref 1–7)
NITRITE UR QL STRIP.AUTO: NEGATIVE
PDW BLD-RTO: 13.8 % (ref 11.5–14.9)
PLATELET # BLD AUTO: 260 K/UL (ref 150–450)
PMV BLD AUTO: 7.9 FL (ref 6–12)
PROT UR STRIP.AUTO-MCNC: 6.5 MG/DL (ref 5–8)
PROT UR STRIP.AUTO-MCNC: NEGATIVE MG/DL
PROTHROMBIN TIME: 14.7 SEC (ref 11.8–14.6)
RBC # BLD: 4.95 M/UL (ref 4.5–5.9)
SEG NEUTROPHILS: 41 % (ref 36–66)
SEGMENTED NEUTROPHILS ABSOLUTE COUNT: 3.6 K/UL (ref 1.3–9.1)
SPECIFIC GRAVITY UA: 1.02 (ref 1–1.03)
TURBIDITY: CLEAR
URINE HGB: NEGATIVE
UROBILINOGEN, URINE: NORMAL
WBC # BLD AUTO: 8.8 K/UL (ref 3.5–11)

## 2023-05-11 PROCEDURE — 85610 PROTHROMBIN TIME: CPT

## 2023-05-11 PROCEDURE — 96375 TX/PRO/DX INJ NEW DRUG ADDON: CPT

## 2023-05-11 PROCEDURE — 83735 ASSAY OF MAGNESIUM: CPT

## 2023-05-11 PROCEDURE — 36415 COLL VENOUS BLD VENIPUNCTURE: CPT

## 2023-05-11 PROCEDURE — 80076 HEPATIC FUNCTION PANEL: CPT

## 2023-05-11 PROCEDURE — 2580000003 HC RX 258: Performed by: STUDENT IN AN ORGANIZED HEALTH CARE EDUCATION/TRAINING PROGRAM

## 2023-05-11 PROCEDURE — 96374 THER/PROPH/DIAG INJ IV PUSH: CPT

## 2023-05-11 PROCEDURE — 93005 ELECTROCARDIOGRAM TRACING: CPT | Performed by: STUDENT IN AN ORGANIZED HEALTH CARE EDUCATION/TRAINING PROGRAM

## 2023-05-11 PROCEDURE — 83690 ASSAY OF LIPASE: CPT

## 2023-05-11 PROCEDURE — 81003 URINALYSIS AUTO W/O SCOPE: CPT

## 2023-05-11 PROCEDURE — 6360000002 HC RX W HCPCS: Performed by: STUDENT IN AN ORGANIZED HEALTH CARE EDUCATION/TRAINING PROGRAM

## 2023-05-11 PROCEDURE — 99285 EMERGENCY DEPT VISIT HI MDM: CPT

## 2023-05-11 PROCEDURE — 74177 CT ABD & PELVIS W/CONTRAST: CPT

## 2023-05-11 PROCEDURE — 80048 BASIC METABOLIC PNL TOTAL CA: CPT

## 2023-05-11 PROCEDURE — 71045 X-RAY EXAM CHEST 1 VIEW: CPT

## 2023-05-11 PROCEDURE — 85025 COMPLETE CBC W/AUTO DIFF WBC: CPT

## 2023-05-11 PROCEDURE — 6360000004 HC RX CONTRAST MEDICATION: Performed by: STUDENT IN AN ORGANIZED HEALTH CARE EDUCATION/TRAINING PROGRAM

## 2023-05-11 RX ORDER — ONDANSETRON 2 MG/ML
4 INJECTION INTRAMUSCULAR; INTRAVENOUS ONCE
Status: COMPLETED | OUTPATIENT
Start: 2023-05-11 | End: 2023-05-11

## 2023-05-11 RX ORDER — 0.9 % SODIUM CHLORIDE 0.9 %
1000 INTRAVENOUS SOLUTION INTRAVENOUS ONCE
Status: COMPLETED | OUTPATIENT
Start: 2023-05-11 | End: 2023-05-12

## 2023-05-11 RX ORDER — 0.9 % SODIUM CHLORIDE 0.9 %
100 INTRAVENOUS SOLUTION INTRAVENOUS ONCE
Status: COMPLETED | OUTPATIENT
Start: 2023-05-12 | End: 2023-05-12

## 2023-05-11 RX ORDER — SODIUM CHLORIDE 0.9 % (FLUSH) 0.9 %
10 SYRINGE (ML) INJECTION PRN
Status: COMPLETED | OUTPATIENT
Start: 2023-05-11 | End: 2023-05-12

## 2023-05-11 RX ORDER — KETOROLAC TROMETHAMINE 30 MG/ML
15 INJECTION, SOLUTION INTRAMUSCULAR; INTRAVENOUS ONCE
Status: COMPLETED | OUTPATIENT
Start: 2023-05-11 | End: 2023-05-11

## 2023-05-11 RX ADMIN — ONDANSETRON 4 MG: 2 INJECTION INTRAMUSCULAR; INTRAVENOUS at 23:08

## 2023-05-11 RX ADMIN — KETOROLAC TROMETHAMINE 15 MG: 30 INJECTION, SOLUTION INTRAMUSCULAR; INTRAVENOUS at 23:08

## 2023-05-11 RX ADMIN — SODIUM CHLORIDE 1000 ML: 9 INJECTION, SOLUTION INTRAVENOUS at 23:07

## 2023-05-11 ASSESSMENT — ENCOUNTER SYMPTOMS
CHEST TIGHTNESS: 0
EYE REDNESS: 0
RHINORRHEA: 0
EYE DISCHARGE: 0
NAUSEA: 0
DIARRHEA: 0
SHORTNESS OF BREATH: 0
VOMITING: 0
ABDOMINAL PAIN: 1
SORE THROAT: 0

## 2023-05-11 ASSESSMENT — PAIN - FUNCTIONAL ASSESSMENT: PAIN_FUNCTIONAL_ASSESSMENT: 0-10

## 2023-05-11 ASSESSMENT — PAIN SCALES - GENERAL: PAINLEVEL_OUTOF10: 6

## 2023-05-12 VITALS
DIASTOLIC BLOOD PRESSURE: 78 MMHG | SYSTOLIC BLOOD PRESSURE: 135 MMHG | WEIGHT: 245 LBS | RESPIRATION RATE: 17 BRPM | BODY MASS INDEX: 34.3 KG/M2 | HEIGHT: 71 IN | OXYGEN SATURATION: 100 % | TEMPERATURE: 98 F | HEART RATE: 67 BPM

## 2023-05-12 LAB
ALBUMIN SERPL-MCNC: 4.4 G/DL (ref 3.5–5.2)
ALP SERPL-CCNC: 143 U/L (ref 40–129)
ALT SERPL-CCNC: 71 U/L (ref 5–41)
ANION GAP SERPL CALCULATED.3IONS-SCNC: 11 MMOL/L (ref 9–17)
AST SERPL-CCNC: 31 U/L
BILIRUB DIRECT SERPL-MCNC: 0.1 MG/DL
BILIRUB INDIRECT SERPL-MCNC: 0.4 MG/DL (ref 0–1)
BILIRUB SERPL-MCNC: 0.5 MG/DL (ref 0.3–1.2)
BUN SERPL-MCNC: 18 MG/DL (ref 6–20)
CALCIUM SERPL-MCNC: 8.9 MG/DL (ref 8.6–10.4)
CHLORIDE SERPL-SCNC: 104 MMOL/L (ref 98–107)
CO2 SERPL-SCNC: 21 MMOL/L (ref 20–31)
CREAT SERPL-MCNC: 1.25 MG/DL (ref 0.7–1.2)
EKG ATRIAL RATE: 55 BPM
EKG P AXIS: 3 DEGREES
EKG P-R INTERVAL: 152 MS
EKG Q-T INTERVAL: 446 MS
EKG QRS DURATION: 90 MS
EKG QTC CALCULATION (BAZETT): 426 MS
EKG R AXIS: 66 DEGREES
EKG T AXIS: 23 DEGREES
EKG VENTRICULAR RATE: 55 BPM
GFR SERPL CREATININE-BSD FRML MDRD: >60 ML/MIN/1.73M2
GLUCOSE SERPL-MCNC: 107 MG/DL (ref 70–99)
LIPASE SERPL-CCNC: 23 U/L (ref 13–60)
MAGNESIUM SERPL-MCNC: 2.1 MG/DL (ref 1.6–2.6)
POTASSIUM SERPL-SCNC: 3.7 MMOL/L (ref 3.7–5.3)
PROT SERPL-MCNC: 6.8 G/DL (ref 6.4–8.3)
SODIUM SERPL-SCNC: 136 MMOL/L (ref 135–144)

## 2023-05-12 PROCEDURE — 2580000003 HC RX 258: Performed by: STUDENT IN AN ORGANIZED HEALTH CARE EDUCATION/TRAINING PROGRAM

## 2023-05-12 PROCEDURE — 93010 ELECTROCARDIOGRAM REPORT: CPT | Performed by: INTERNAL MEDICINE

## 2023-05-12 PROCEDURE — 6370000000 HC RX 637 (ALT 250 FOR IP): Performed by: STUDENT IN AN ORGANIZED HEALTH CARE EDUCATION/TRAINING PROGRAM

## 2023-05-12 PROCEDURE — 6360000004 HC RX CONTRAST MEDICATION: Performed by: STUDENT IN AN ORGANIZED HEALTH CARE EDUCATION/TRAINING PROGRAM

## 2023-05-12 RX ORDER — CIPROFLOXACIN 500 MG/1
500 TABLET, FILM COATED ORAL ONCE
Status: COMPLETED | OUTPATIENT
Start: 2023-05-12 | End: 2023-05-12

## 2023-05-12 RX ORDER — METRONIDAZOLE 500 MG/1
500 TABLET ORAL ONCE
Status: COMPLETED | OUTPATIENT
Start: 2023-05-12 | End: 2023-05-12

## 2023-05-12 RX ORDER — METRONIDAZOLE 500 MG/1
500 TABLET ORAL 2 TIMES DAILY
Qty: 20 TABLET | Refills: 0 | Status: SHIPPED | OUTPATIENT
Start: 2023-05-12 | End: 2023-05-22

## 2023-05-12 RX ORDER — CIPROFLOXACIN 500 MG/1
500 TABLET, FILM COATED ORAL 2 TIMES DAILY
Qty: 20 TABLET | Refills: 0 | Status: SHIPPED | OUTPATIENT
Start: 2023-05-12 | End: 2023-05-22

## 2023-05-12 RX ORDER — ONDANSETRON 4 MG/1
4 TABLET, ORALLY DISINTEGRATING ORAL 3 TIMES DAILY PRN
Qty: 21 TABLET | Refills: 0 | Status: SHIPPED | OUTPATIENT
Start: 2023-05-12

## 2023-05-12 RX ORDER — IBUPROFEN 600 MG/1
600 TABLET ORAL 3 TIMES DAILY PRN
Qty: 30 TABLET | Refills: 0 | Status: SHIPPED | OUTPATIENT
Start: 2023-05-12

## 2023-05-12 RX ORDER — OXYMETAZOLINE HYDROCHLORIDE 0.05 G/100ML
2 SPRAY NASAL ONCE
Status: COMPLETED | OUTPATIENT
Start: 2023-05-12 | End: 2023-05-12

## 2023-05-12 RX ADMIN — OXYMETAZOLINE HCL 2 SPRAY: 0.05 SPRAY NASAL at 00:40

## 2023-05-12 RX ADMIN — CIPROFLOXACIN 500 MG: 500 TABLET, FILM COATED ORAL at 01:25

## 2023-05-12 RX ADMIN — METRONIDAZOLE 500 MG: 500 TABLET ORAL at 01:24

## 2023-05-12 RX ADMIN — SODIUM CHLORIDE, PRESERVATIVE FREE 10 ML: 5 INJECTION INTRAVENOUS at 00:29

## 2023-05-12 RX ADMIN — IOPAMIDOL 75 ML: 755 INJECTION, SOLUTION INTRAVENOUS at 00:30

## 2023-05-12 RX ADMIN — SODIUM CHLORIDE 100 ML: 9 INJECTION, SOLUTION INTRAVENOUS at 00:29

## 2023-05-12 ASSESSMENT — PAIN - FUNCTIONAL ASSESSMENT: PAIN_FUNCTIONAL_ASSESSMENT: 0-10

## 2023-05-12 ASSESSMENT — PAIN SCALES - GENERAL: PAINLEVEL_OUTOF10: 3

## 2023-05-12 ASSESSMENT — PAIN DESCRIPTION - LOCATION: LOCATION: ABDOMEN

## 2023-05-12 NOTE — ED NOTES
Discharge instructions reviewed with patient, noting all directions and education by provider. Patient verbalizes understanding of all information reviewed, gathered personal items, and transferred under own power off unit to lobby without incident.       Con Stein RN  23/76/76 [id number]

## 2023-05-12 NOTE — ED PROVIDER NOTES
EMERGENCY DEPARTMENT ENCOUNTER    Pt Name: James Warren  MRN: [id number]  [place] 1990  Date of evaluation: 5/11/23  CHIEF COMPLAINT       Chief Complaint   Patient presents with    Abdominal Pain    Nausea     HISTORY OF PRESENT ILLNESS   32 yo M w/ no significant medical history presents with abd pain    Patient states that over the last couple weeks he is having this right upper quadrant abdominal discomfort    He does seem to be worse occasionally with eating. It is a sharp pain. He denies any fevers chills constipation diarrhea. He is not having any dysuria hematuria frequency    No chest pain or pressure. No cough or shortness of breath                REVIEW OF SYSTEMS     Review of Systems   Constitutional:  Negative for chills and fever. HENT:  Negative for rhinorrhea and sore throat. Eyes:  Negative for discharge and redness. Respiratory:  Negative for chest tightness and shortness of breath. Cardiovascular:  Negative for chest pain. Gastrointestinal:  Positive for abdominal pain. Negative for diarrhea, nausea and vomiting. Genitourinary:  Negative for dysuria and frequency. Musculoskeletal:  Negative for arthralgias and myalgias. Skin:  Negative for rash. Neurological:  Negative for weakness and numbness. Psychiatric/Behavioral:  Negative for suicidal ideas. All other systems reviewed and are negative. PASTMEDICAL HISTORY     Past Medical History:   Diagnosis Date    Anxiety     Headache      Past Problem List  Patient Active Problem List   Diagnosis Code    Facial injury S09. 93XA    Former smoker Z87.891    Panic attacks F41.0    Blurred vision H53.8    Episodic altered awareness R40.4    Migraine without aura and without status migrainosus, not intractable G43.009    Excessive daytime sleepiness G47.19    ELIZABETH (obstructive sleep apnea) G47.33     SURGICAL HISTORY     No past surgical history on file.   CURRENT MEDICATIONS       Previous Medications    ACETAMINOPHEN

## 2023-05-12 NOTE — ED TRIAGE NOTES
Mode of arrival (squad #, walk in, police, etc) : walk-in        Chief complaint(s): abd pain, nausea        Arrival Note (brief scenario, treatment PTA, etc). : Pt reports abd pain x2 weeks. Pt reports nausea x4 weeks. C= \"Have you ever felt that you should Cut down on your drinking? \"  No  A= \"Have people Annoyed you by criticizing your drinking? \"  No  G= \"Have you ever felt bad or Guilty about your drinking? \"  No  E= \"Have you ever had a drink as an Eye-opener first thing in the morning to steady your nerves or to help a hangover? \"  No      Deferred []      Reason for deferring: N/A    *If yes to two or more: probable alcohol abuse. *

## 2023-05-26 ENCOUNTER — OFFICE VISIT (OUTPATIENT)
Dept: NEUROLOGY | Age: 33
End: 2023-05-26
Payer: COMMERCIAL

## 2023-05-26 VITALS
BODY MASS INDEX: 35 KG/M2 | SYSTOLIC BLOOD PRESSURE: 120 MMHG | HEART RATE: 82 BPM | HEIGHT: 71 IN | WEIGHT: 250 LBS | DIASTOLIC BLOOD PRESSURE: 78 MMHG

## 2023-05-26 DIAGNOSIS — G47.33 OSA (OBSTRUCTIVE SLEEP APNEA): Primary | ICD-10-CM

## 2023-05-26 DIAGNOSIS — G43.009 MIGRAINE WITHOUT AURA AND WITHOUT STATUS MIGRAINOSUS, NOT INTRACTABLE: ICD-10-CM

## 2023-05-26 DIAGNOSIS — F41.0 PANIC ATTACKS: ICD-10-CM

## 2023-05-26 PROCEDURE — 99214 OFFICE O/P EST MOD 30 MIN: CPT | Performed by: PSYCHIATRY & NEUROLOGY

## 2023-05-26 PROCEDURE — G8427 DOCREV CUR MEDS BY ELIG CLIN: HCPCS | Performed by: PSYCHIATRY & NEUROLOGY

## 2023-05-26 PROCEDURE — 1036F TOBACCO NON-USER: CPT | Performed by: PSYCHIATRY & NEUROLOGY

## 2023-05-26 PROCEDURE — G8417 CALC BMI ABV UP PARAM F/U: HCPCS | Performed by: PSYCHIATRY & NEUROLOGY

## 2023-05-26 RX ORDER — RIMEGEPANT SULFATE 75 MG/75MG
TABLET, ORALLY DISINTEGRATING ORAL
Qty: 10 TABLET | Refills: 5 | Status: SHIPPED | OUTPATIENT
Start: 2023-05-26

## 2023-05-26 RX ORDER — TOPIRAMATE 100 MG/1
100 TABLET, FILM COATED ORAL 2 TIMES DAILY
Qty: 60 TABLET | Refills: 11 | Status: SHIPPED | OUTPATIENT
Start: 2023-05-26 | End: 2023-06-25

## 2023-05-26 ASSESSMENT — ENCOUNTER SYMPTOMS
EYES NEGATIVE: 1
RESPIRATORY NEGATIVE: 1
GASTROINTESTINAL NEGATIVE: 1
ALLERGIC/IMMUNOLOGIC NEGATIVE: 1

## 2023-05-26 NOTE — PROGRESS NOTES
Active Problem patient has bene seen by Vitaliy Morfin CNP for episodes of altered awareness felt to be from anxiety and panic attacks . Common migraine headaches  . Sleep apnea on nasal CPAP . The condition is he reports that panic attacks are gone on prozac 20 mg po qd with no episodes for several months . He reports everything will start to spin needing to stare at something with this not going away  . Hedacaabundio are undergood control with lasts headache two months ago . He is on topamax 100 mg po bid having headaches every several months that will last from few days to few weeks . She has tried maxalt , imitrex and fioricet that have not helped . He has sleep apnea using nasal CPAP feeling better . He will go to bed at 9 to 10 PM falling asleep within few minutes sleeping 4 to 7 AM . There will be 3 awakenings able to fall back asleep . He is on desyrel 100 mg po qhs that consolidates sleep . He does feel better with nasal CPAP having some fatigue on awakening that viri go away . His mask has never timothy changed form original sleep study . Significant medications prozac 20 mg po qd ,  topamax 100 mg po bid , desyrel 100 mg po qhs . Testing MRI lumbar spine mild degenerative thecal sac narrowing and neural foraminal stenosis at L4-5 and L5-S1 , February 2022 . Polysomnogram apnea hypopea index 5.6 episodes per hour with oxyhemoglobin desaturation to 87 % , February 2020 . Auto CPAP 6-10 cm H20      Past Medical History:   Diagnosis Date    Anxiety     Headache        No past surgical history on file.     Family History   Problem Relation Age of Onset    Heart Disease Maternal Grandmother     Heart Disease Maternal Grandfather     Heart Attack Father         'minor heart attack' 40s        Social History     Socioeconomic History    Marital status:      Spouse name: None    Number of children: None    Years of education: None    Highest education level: None   Tobacco Use    Smoking status: Former

## 2023-06-07 ENCOUNTER — TELEPHONE (OUTPATIENT)
Dept: NEUROLOGY | Age: 33
End: 2023-06-07

## 2023-06-09 ENCOUNTER — OFFICE VISIT (OUTPATIENT)
Dept: GASTROENTEROLOGY | Age: 33
End: 2023-06-09
Payer: COMMERCIAL

## 2023-06-09 VITALS
HEIGHT: 71 IN | SYSTOLIC BLOOD PRESSURE: 134 MMHG | DIASTOLIC BLOOD PRESSURE: 89 MMHG | WEIGHT: 254 LBS | BODY MASS INDEX: 35.56 KG/M2

## 2023-06-09 DIAGNOSIS — K21.9 GASTROESOPHAGEAL REFLUX DISEASE, UNSPECIFIED WHETHER ESOPHAGITIS PRESENT: ICD-10-CM

## 2023-06-09 DIAGNOSIS — K52.9 COLITIS: Primary | ICD-10-CM

## 2023-06-09 DIAGNOSIS — R10.11 RUQ PAIN: ICD-10-CM

## 2023-06-09 DIAGNOSIS — E66.01 SEVERE OBESITY (BMI 35.0-39.9) WITH COMORBIDITY (HCC): ICD-10-CM

## 2023-06-09 DIAGNOSIS — Z79.1 LONG TERM (CURRENT) USE OF NON-STEROIDAL ANTI-INFLAMMATORIES (NSAID): ICD-10-CM

## 2023-06-09 DIAGNOSIS — R12 HEARTBURN: ICD-10-CM

## 2023-06-09 PROCEDURE — 1036F TOBACCO NON-USER: CPT | Performed by: INTERNAL MEDICINE

## 2023-06-09 PROCEDURE — G8427 DOCREV CUR MEDS BY ELIG CLIN: HCPCS | Performed by: INTERNAL MEDICINE

## 2023-06-09 PROCEDURE — 99204 OFFICE O/P NEW MOD 45 MIN: CPT | Performed by: INTERNAL MEDICINE

## 2023-06-09 PROCEDURE — APPSS60 APP SPLIT SHARED TIME 46-60 MINUTES: Performed by: NURSE PRACTITIONER

## 2023-06-09 PROCEDURE — G8417 CALC BMI ABV UP PARAM F/U: HCPCS | Performed by: INTERNAL MEDICINE

## 2023-06-09 RX ORDER — SODIUM, POTASSIUM,MAG SULFATES 17.5-3.13G
SOLUTION, RECONSTITUTED, ORAL ORAL
Qty: 1 EACH | Refills: 0 | Status: SHIPPED | OUTPATIENT
Start: 2023-06-09

## 2023-06-09 RX ORDER — BISACODYL 5 MG/1
TABLET, DELAYED RELEASE ORAL
Qty: 4 TABLET | Refills: 0 | Status: SHIPPED | OUTPATIENT
Start: 2023-06-09

## 2023-06-09 ASSESSMENT — ENCOUNTER SYMPTOMS
RESPIRATORY NEGATIVE: 1
ANAL BLEEDING: 1
CONSTIPATION: 1
RECTAL PAIN: 1
NAUSEA: 1
VOMITING: 1
ALLERGIC/IMMUNOLOGIC NEGATIVE: 1
ABDOMINAL PAIN: 1
EYES NEGATIVE: 1
ABDOMINAL DISTENTION: 1
DIARRHEA: 1

## 2023-06-09 NOTE — PROGRESS NOTES
of chronic GERD. He is known to have sleep apnea, he does not use CPAP on regular basis. Long discussion with the patient and wife regarding reflux and to use CPAP on daily basis. Also he may need ultrasound of the gallbladder arranged. To further evaluate CT findings of colitis and may need colonoscopy. Also will arrange EGD to check esophageal pathology. Also it appears that this patient may have IBS symptoms which appears to be chronic for 10 to 15 years. Discussed regarding this and reassured. Brochures given. Explained symptomatic management. Spent 30 minutes providing patient education and counseling. Thank you for allowing me to participate in the care of Mr. Jagdish Jackson. For any further questions please do not hesitate to contact me. Note is dictated utilizing voice recognition software. Unfortunately this leads to occasional typographical errors. Please contact our office if you have any questions. I have reviewed and agree with the MA/SHEAN ROS.      Chasity Grace MD, Raz Napier  Board Certified in Gastroenterology and [address] [place] [address] Gastroenterology  Office #: [phone]

## 2023-06-19 ENCOUNTER — HOSPITAL ENCOUNTER (OUTPATIENT)
Dept: PREADMISSION TESTING | Age: 33
Discharge: HOME | End: 2023-06-23

## 2023-06-19 VITALS — HEIGHT: 71 IN | WEIGHT: 254 LBS | BODY MASS INDEX: 35.56 KG/M2

## 2023-06-21 ENCOUNTER — TELEPHONE (OUTPATIENT)
Dept: NEUROLOGY | Age: 33
End: 2023-06-21

## 2023-06-21 NOTE — TELEPHONE ENCOUNTER
Chart shows original order for CPAP was sent to [address] [place] [address].  5/26/23 office note has an attached note stating that the order for CPAP supplies and office note would be faxed to [address] [place] [address]. Call placed to the patient to see if he had been contacted by DME, however received his VM, no message left. Call placed to [address] [place] [address] to see if they received the recent order and office note. Confirmed that they didn't receive anything. Confirmed fax number and successfully faxed both order and office note to [phone]. Call placed to the patient and a message left on his VM asking for a return call.

## 2023-06-21 NOTE — TELEPHONE ENCOUNTER
----- Message from Cherylene Love, LPN sent at 3/61/1349  8:26 AM EDT -----    ----- Message -----  From: Jay Duverney, MD  Sent: 5/26/2023   7:02 PM EDT  To: [facility] Clinical Support Pool    Message to [place] Matters . Patient has not had new headgear ordered since sleep  study 2 to 3 years ago fo be through [facility] . 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  Please let patient know

## 2023-06-23 ENCOUNTER — HOSPITAL ENCOUNTER (OUTPATIENT)
Dept: ULTRASOUND IMAGING | Age: 33
Discharge: HOME | End: 2023-06-23
Payer: COMMERCIAL

## 2023-06-23 DIAGNOSIS — R10.11 RUQ PAIN: ICD-10-CM

## 2023-06-23 PROCEDURE — 76705 ECHO EXAM OF ABDOMEN: CPT

## 2023-06-29 ENCOUNTER — ANESTHESIA EVENT (OUTPATIENT)
Dept: ENDOSCOPY | Age: 33
End: 2023-06-29
Payer: COMMERCIAL

## 2023-06-30 ENCOUNTER — HOSPITAL ENCOUNTER (OUTPATIENT)
Age: 33
Setting detail: OUTPATIENT SURGERY
Discharge: HOME | End: 2023-06-30
Attending: INTERNAL MEDICINE | Admitting: INTERNAL MEDICINE
Payer: COMMERCIAL

## 2023-06-30 ENCOUNTER — ANESTHESIA (OUTPATIENT)
Dept: ENDOSCOPY | Age: 33
End: 2023-06-30
Payer: COMMERCIAL

## 2023-06-30 VITALS
WEIGHT: 254 LBS | TEMPERATURE: 98 F | OXYGEN SATURATION: 96 % | DIASTOLIC BLOOD PRESSURE: 77 MMHG | RESPIRATION RATE: 27 BRPM | BODY MASS INDEX: 35.56 KG/M2 | HEIGHT: 71 IN | SYSTOLIC BLOOD PRESSURE: 157 MMHG | HEART RATE: 67 BPM

## 2023-06-30 DIAGNOSIS — K52.9 COLITIS: ICD-10-CM

## 2023-06-30 PROCEDURE — 3700000000 HC ANESTHESIA ATTENDED CARE: Performed by: INTERNAL MEDICINE

## 2023-06-30 PROCEDURE — 6360000002 HC RX W HCPCS: Performed by: NURSE ANESTHETIST, CERTIFIED REGISTERED

## 2023-06-30 PROCEDURE — 7100000010 HC PHASE II RECOVERY - FIRST 15 MIN: Performed by: INTERNAL MEDICINE

## 2023-06-30 PROCEDURE — 3609012400 HC EGD TRANSORAL BIOPSY SINGLE/MULTIPLE: Performed by: INTERNAL MEDICINE

## 2023-06-30 PROCEDURE — 43239 EGD BIOPSY SINGLE/MULTIPLE: CPT | Performed by: INTERNAL MEDICINE

## 2023-06-30 PROCEDURE — 2500000003 HC RX 250 WO HCPCS: Performed by: NURSE ANESTHETIST, CERTIFIED REGISTERED

## 2023-06-30 PROCEDURE — 2709999900 HC NON-CHARGEABLE SUPPLY: Performed by: INTERNAL MEDICINE

## 2023-06-30 PROCEDURE — 3609010300 HC COLONOSCOPY W/BIOPSY SINGLE/MULTIPLE: Performed by: INTERNAL MEDICINE

## 2023-06-30 PROCEDURE — 2580000003 HC RX 258: Performed by: ANESTHESIOLOGY

## 2023-06-30 PROCEDURE — 88305 TISSUE EXAM BY PATHOLOGIST: CPT

## 2023-06-30 PROCEDURE — 7100000011 HC PHASE II RECOVERY - ADDTL 15 MIN: Performed by: INTERNAL MEDICINE

## 2023-06-30 PROCEDURE — 3700000001 HC ADD 15 MINUTES (ANESTHESIA): Performed by: INTERNAL MEDICINE

## 2023-06-30 PROCEDURE — 45380 COLONOSCOPY AND BIOPSY: CPT | Performed by: INTERNAL MEDICINE

## 2023-06-30 RX ORDER — PROPOFOL 10 MG/ML
INJECTION, EMULSION INTRAVENOUS PRN
Status: DISCONTINUED | OUTPATIENT
Start: 2023-06-30 | End: 2023-06-30 | Stop reason: SDUPTHER

## 2023-06-30 RX ORDER — SODIUM CHLORIDE, SODIUM LACTATE, POTASSIUM CHLORIDE, CALCIUM CHLORIDE 600; 310; 30; 20 MG/100ML; MG/100ML; MG/100ML; MG/100ML
INJECTION, SOLUTION INTRAVENOUS CONTINUOUS
Status: DISCONTINUED | OUTPATIENT
Start: 2023-06-30 | End: 2023-06-30 | Stop reason: HOSPADM

## 2023-06-30 RX ORDER — LIDOCAINE HYDROCHLORIDE 20 MG/ML
INJECTION, SOLUTION EPIDURAL; INFILTRATION; INTRACAUDAL; PERINEURAL PRN
Status: DISCONTINUED | OUTPATIENT
Start: 2023-06-30 | End: 2023-06-30 | Stop reason: SDUPTHER

## 2023-06-30 RX ORDER — SODIUM CHLORIDE 0.9 % (FLUSH) 0.9 %
5-40 SYRINGE (ML) INJECTION PRN
Status: DISCONTINUED | OUTPATIENT
Start: 2023-06-30 | End: 2023-06-30 | Stop reason: HOSPADM

## 2023-06-30 RX ORDER — PROPOFOL 10 MG/ML
INJECTION, EMULSION INTRAVENOUS CONTINUOUS PRN
Status: DISCONTINUED | OUTPATIENT
Start: 2023-06-30 | End: 2023-06-30 | Stop reason: SDUPTHER

## 2023-06-30 RX ORDER — SODIUM CHLORIDE 9 MG/ML
INJECTION, SOLUTION INTRAVENOUS PRN
Status: DISCONTINUED | OUTPATIENT
Start: 2023-06-30 | End: 2023-06-30 | Stop reason: HOSPADM

## 2023-06-30 RX ORDER — MIDAZOLAM HYDROCHLORIDE 1 MG/ML
INJECTION INTRAMUSCULAR; INTRAVENOUS PRN
Status: DISCONTINUED | OUTPATIENT
Start: 2023-06-30 | End: 2023-06-30 | Stop reason: SDUPTHER

## 2023-06-30 RX ORDER — SODIUM CHLORIDE 0.9 % (FLUSH) 0.9 %
5-40 SYRINGE (ML) INJECTION EVERY 12 HOURS SCHEDULED
Status: DISCONTINUED | OUTPATIENT
Start: 2023-06-30 | End: 2023-06-30 | Stop reason: HOSPADM

## 2023-06-30 RX ORDER — LIDOCAINE HYDROCHLORIDE 10 MG/ML
1 INJECTION, SOLUTION EPIDURAL; INFILTRATION; INTRACAUDAL; PERINEURAL
Status: DISCONTINUED | OUTPATIENT
Start: 2023-06-30 | End: 2023-06-30 | Stop reason: HOSPADM

## 2023-06-30 RX ADMIN — LIDOCAINE HYDROCHLORIDE 80 MG: 20 INJECTION, SOLUTION EPIDURAL; INFILTRATION; INTRACAUDAL; PERINEURAL at 09:30

## 2023-06-30 RX ADMIN — PROPOFOL 30 MG: 10 INJECTION, EMULSION INTRAVENOUS at 09:46

## 2023-06-30 RX ADMIN — PROPOFOL 150 MCG/KG/MIN: 10 INJECTION, EMULSION INTRAVENOUS at 09:32

## 2023-06-30 RX ADMIN — PROPOFOL 30 MG: 10 INJECTION, EMULSION INTRAVENOUS at 09:48

## 2023-06-30 RX ADMIN — SODIUM CHLORIDE, POTASSIUM CHLORIDE, SODIUM LACTATE AND CALCIUM CHLORIDE: 600; 310; 30; 20 INJECTION, SOLUTION INTRAVENOUS at 08:58

## 2023-06-30 RX ADMIN — PROPOFOL 70 MG: 10 INJECTION, EMULSION INTRAVENOUS at 09:33

## 2023-06-30 RX ADMIN — MIDAZOLAM 2 MG: 1 INJECTION INTRAMUSCULAR; INTRAVENOUS at 09:30

## 2023-06-30 ASSESSMENT — ENCOUNTER SYMPTOMS
SHORTNESS OF BREATH: 0
WHEEZING: 1
BACK PAIN: 1
SORE THROAT: 0
COUGH: 1

## 2023-06-30 ASSESSMENT — PAIN SCALES - GENERAL: PAINLEVEL_OUTOF10: 0

## 2023-06-30 ASSESSMENT — PAIN - FUNCTIONAL ASSESSMENT: PAIN_FUNCTIONAL_ASSESSMENT: 0-10

## 2023-07-03 LAB — SURGICAL PATHOLOGY REPORT: NORMAL

## 2023-07-21 ENCOUNTER — OFFICE VISIT (OUTPATIENT)
Dept: GASTROENTEROLOGY | Age: 33
End: 2023-07-21

## 2023-07-21 VITALS
HEART RATE: 58 BPM | DIASTOLIC BLOOD PRESSURE: 83 MMHG | BODY MASS INDEX: 34.44 KG/M2 | SYSTOLIC BLOOD PRESSURE: 120 MMHG | WEIGHT: 246 LBS | HEIGHT: 71 IN

## 2023-07-21 DIAGNOSIS — K76.0 FATTY LIVER: Primary | ICD-10-CM

## 2023-07-21 DIAGNOSIS — K21.9 GASTROESOPHAGEAL REFLUX DISEASE, UNSPECIFIED WHETHER ESOPHAGITIS PRESENT: ICD-10-CM

## 2023-07-21 DIAGNOSIS — R12 HEARTBURN: ICD-10-CM

## 2023-07-21 RX ORDER — PANTOPRAZOLE SODIUM 40 MG/1
40 TABLET, DELAYED RELEASE ORAL
Qty: 30 TABLET | Refills: 5 | Status: SHIPPED | OUTPATIENT
Start: 2023-07-21

## 2023-07-21 ASSESSMENT — ENCOUNTER SYMPTOMS
ABDOMINAL DISTENTION: 0
TROUBLE SWALLOWING: 0
WHEEZING: 0
CONSTIPATION: 1
ANAL BLEEDING: 0
VOMITING: 0
CHOKING: 0
SINUS PRESSURE: 0
DIARRHEA: 1
ABDOMINAL PAIN: 1
VOICE CHANGE: 0
BLOOD IN STOOL: 1
NAUSEA: 1
SORE THROAT: 0
COUGH: 0
RECTAL PAIN: 1
BACK PAIN: 0

## 2023-07-21 NOTE — PROGRESS NOTES
GI CLINIC FOLLOW UP    INTERVAL HISTORY:   No referring provider defined for this encounter. Chief Complaint   Patient presents with    Follow Up After Procedure     Patient is here today to follow up from her EGD and colonoscopy. HISTORY OF PRESENT ILLNESS:     Patient being seen for follow-up EGD and colonoscopy. Patient had EGD to evaluate epigastric discomfort, GERD. Colonoscopy performed to evaluate abdominal pain, abnormal CT. Colonoscopy had poor preparation. No lesions seen. Random biopsies negative. EGD revealed antral inflammation consistent with NSAID use. Biopsies negative for H. pylori. Clinically unchanged. Takes Pepcid as needed for GERD. Reports this is not helping him. Symptoms worse at night. No nausea, vomiting. Tolerating diet. Reports intermittent constipation. Recently started taking fiber supplementation. Denies any melena, hematochezia. No abdominal pain. `    Past Medical,Family, and Social History reviewed and does contribute to the patient presentingcondition. Patient's PMH/PSH,SH,PSYCH Hx, MEDs, ALLERGIES, and ROS were all reviewed and updated in the appropriate sections. PAST MEDICAL HISTORY:  Past Medical History:   Diagnosis Date    Abdominal pain     Anxiety     Bloating     Constipation     GERD (gastroesophageal reflux disease)     Headache     Migraines     Panic attacks     Poor venous access     Rectal bleeding     Sleep apnea     MILD, HAS CPAP BUT DOESN'T USE.     Sleep apnea     wears CPAP       Past Surgical History:   Procedure Laterality Date    COLONOSCOPY N/A 6/30/2023    COLONOSCOPY WITH BIOPSY performed by Judy Martino MD at [address] [facility] N/A 6/30/2023    EGD BIOPSY performed by Judy Martino MD at [address]:    Current Outpatient Medications:     pantoprazole (PROTONIX) 40 MG tablet, Take 1 tablet by mouth every morning (before breakfast), Disp: 30

## 2023-08-14 NOTE — PATIENT INSTRUCTIONS
Patient Education        Low Back Pain: Exercises  Introduction  Here are some examples of exercises for you to try. The exercises may be suggested for a condition or for rehabilitation. Start each exercise slowly. Ease off the exercises if you start to have pain. You will be told when to start these exercises and which ones will work best for you. How to do the exercises  Press-up   1. Lie on your stomach, supporting your body with your forearms. 2. Press your elbows down into the floor to raise your upper back. As you do this, relax your stomach muscles and allow your back to arch without using your back muscles. As your press up, do not let your hips or pelvis come off the floor. 3. Hold for 15 to 30 seconds, then relax. 4. Repeat 2 to 4 times. Alternate arm and leg (bird dog) exercise   Do this exercise slowly. Try to keep your body straight at all times, and do not let one hip drop lower than the other. 1. Start on the floor, on your hands and knees. 2. Tighten your belly muscles. 3. Raise one leg off the floor, and hold it straight out behind you. Be careful not to let your hip drop down, because that will twist your trunk. 4. Hold for about 6 seconds, then lower your leg and switch to the other leg. 5. Repeat 8 to 12 times on each leg. 6. Over time, work up to holding for 10 to 30 seconds each time. 7. If you feel stable and secure with your leg raised, try raising the opposite arm straight out in front of you at the same time. Knee-to-chest exercise   1. Lie on your back with your knees bent and your feet flat on the floor. 2. Bring one knee to your chest, keeping the other foot flat on the floor (or keeping the other leg straight, whichever feels better on your lower back). 3. Keep your lower back pressed to the floor. Hold for at least 15 to 30 seconds. 4. Relax, and lower the knee to the starting position. 5. Repeat with the other leg. Repeat 2 to 4 times with each leg. 6. To get more stretch, put your other leg flat on the floor while pulling your knee to your chest.    Curl-ups   1. Lie on the floor on your back with your knees bent at a 90-degree angle. Your feet should be flat on the floor, about 12 inches from your buttocks. 2. Cross your arms over your chest. If this bothers your neck, try putting your hands behind your neck (not your head), with your elbows spread apart. 3. Slowly tighten your belly muscles and raise your shoulder blades off the floor. 4. Keep your head in line with your body, and do not press your chin to your chest.  5. Hold this position for 1 or 2 seconds, then slowly lower yourself back down to the floor. 6. Repeat 8 to 12 times. Pelvic tilt exercise   1. Lie on your back with your knees bent. 2. \"Brace\" your stomach. This means to tighten your muscles by pulling in and imagining your belly button moving toward your spine. You should feel like your back is pressing to the floor and your hips and pelvis are rocking back. 3. Hold for about 6 seconds while you breathe smoothly. 4. Repeat 8 to 12 times. Heel dig bridging   1. Lie on your back with both knees bent and your ankles bent so that only your heels are digging into the floor. Your knees should be bent about 90 degrees. 2. Then push your heels into the floor, squeeze your buttocks, and lift your hips off the floor until your shoulders, hips, and knees are all in a straight line. 3. Hold for about 6 seconds as you continue to breathe normally, and then slowly lower your hips back down to the floor and rest for up to 10 seconds. 4. Do 8 to 12 repetitions. Hamstring stretch in doorway   1. Lie on your back in a doorway, with one leg through the open door. 2. Slide your leg up the wall to straighten your knee. You should feel a gentle stretch down the back of your leg. 3. Hold the stretch for at least 15 to 30 seconds. Do not arch your back, point your toes, or bend either knee. Keep one heel touching the floor and the other heel touching the wall. 4. Repeat with your other leg. 5. Do 2 to 4 times for each leg. Hip flexor stretch   1. Kneel on the floor with one knee bent and one leg behind you. Place your forward knee over your foot. Keep your other knee touching the floor. 2. Slowly push your hips forward until you feel a stretch in the upper thigh of your rear leg. 3. Hold the stretch for at least 15 to 30 seconds. Repeat with your other leg. 4. Do 2 to 4 times on each side. Wall sit   1. Stand with your back 10 to 12 inches away from a wall. 2. Lean into the wall until your back is flat against it. 3. Slowly slide down until your knees are slightly bent, pressing your lower back into the wall. 4. Hold for about 6 seconds, then slide back up the wall. 5. Repeat 8 to 12 times. Follow-up care is a key part of your treatment and safety. Be sure to make and go to all appointments, and call your doctor if you are having problems. It's also a good idea to know your test results and keep a list of the medicines you take. Where can you learn more? Go to https://Vizibility.VANDOLAY. org and sign in to your [employer] account. Enter I528 in the [facility] box to learn more about \"Low Back Pain: Exercises. \"     If you do not have an account, please click on the \"Sign Up Now\" link. Current as of: March 2, 2020               Content Version: 12.6  © 7158-2535 [employer]. Care instructions adapted under license by [facility] ([facility]). If you have questions about a medical condition or this instruction, always ask your healthcare professional. [address] any warranty or liability for your use of this information.          Patient Education        Neck: Exercises  Introduction 3. Take a deep breath, and as you breathe out, lower your elbows down and behind your back. You will feel your shoulder blades slide down and together, and at the same time you will feel a stretch across your chest and the front of your shoulders. 4. Hold for about 6 seconds, and then relax for up to 10 seconds. 5. Repeat 8 to 12 times. Strengthening: Hands on head   1. Move your head backward, forward, and side to side against gentle pressure from your hands, holding each position for about 6 seconds. 2. Repeat 8 to 12 times. Follow-up care is a key part of your treatment and safety. Be sure to make and go to all appointments, and call your doctor if you are having problems. It's also a good idea to know your test results and keep a list of the medicines you take. Where can you learn more? Go to https://Biosceptrepepiceweb.[employer]. org and sign in to your [employer] /l?k â€¢ splik/ account. Enter P975 in the [employer] box to learn more about \"Neck: Exercises. \"     If you do not have an account, please click on the \"Sign Up Now\" link. Current as of: March 2, 2020               Content Version: 12.6  © 5290-4746 [employer]. Care instructions adapted under license by [facility] ([facility]). If you have questions about a medical condition or this instruction, always ask your healthcare professional. [place] [address] any warranty or liability for your use of this information. Patient Education        Heel Pain: Care Instructions  Your Care Instructions  You can have heel pain from an injury or from everyday overuse, such as running or walking a lot. Plantar fasciitis is the most common cause of heel pain. In this condition, the bottom of your foot from the front of the heel to the base of the toes is sore and hard to walk on. Your heel can get better with rest, anti-inflammatory pain medicines, and stretching exercises. Follow-up care is a key part of your treatment and safety. Be sure to make and go to all appointments, and call your doctor if you are having problems. It's also a good idea to know your test results and keep a list of the medicines you take. How can you care for yourself at home? · Rest your feet often. Reduce your activity to a level that lets you avoid pain. If possible, do not run or walk on hard surfaces. · Take anti-inflammatory medicines to reduce heel pain. These include ibuprofen (Advil, Motrin) and naproxen (Aleve). Read and follow all instructions on the label. · Put ice or a cold pack on your heel for 10 to 20 minutes at a time. Try to do this every 1 to 2 hours for the next 3 days (when you are awake). Put a thin cloth between the ice and your skin. · If ice isn't helping after 2 or 3 days, try heat, such as a heating pad set on low. · If your doctor says it is okay, try these calf stretches. Tight calf muscles can cause heel pain or make it worse. ? Stand about 1 foot from a wall. Place the palms of both hands against the wall at chest level and lean forward against the wall. Put the leg you want to stretch about a step behind your other leg. Keep your back heel on the floor and bend your front knee until you feel a stretch in the back leg. Hold this position for 15 to 30 seconds. Repeat the exercise 2 to 4 times a session. Do 3 to 4 sessions a day. ? Sit down on the floor or a mat with your feet stretched in front of you. Roll up a towel lengthwise, and loop it over the ball of your foot. Holding the towel at both ends, gently pull the towel toward you to stretch your foot. Hold this position for 15 to 30 seconds. Repeat the exercise 2 to 4 times a session. Do 3 to 4 sessions a day. · Wear a night splint if your doctor suggests it. A night splint holds your foot with the toes pointed up. This position gives the bottom of your foot a constant, gentle stretch. · Wear shoes with good arch support. Athletic shoes or shoes with a well-cushioned sole are good choices. · Try a heel lift, heel cup or shoe insert (orthotic) to help cushion your heel. You can buy these at many shoe stores. Use them in both shoes, even if only one foot hurts. · Maintain a healthy weight. This puts less strain on your feet. When should you call for help? Call your doctor now or seek immediate medical care if:    · You have heel pain with fever, redness, or warmth in your heel.     · You have numbness or tingling in your heel. Watch closely for changes in your health, and be sure to contact your doctor if:    · You cannot put weight on the sore foot.     · Your heel pain lasts more than 2 weeks. Where can you learn more? Go to https://Digiscendpepiceweb.Sidecar. org and sign in to your [employer] account. Enter S299 in the [employer] box to learn more about \"Heel Pain: Care Instructions. \"     If you do not have an account, please click on the \"Sign Up Now\" link. Current as of: June 26, 2019               Content Version: 12.6  © 4846-3061 Done In :60 Seconds. Care instructions adapted under license by [facility] ([facility]). If you have questions about a medical condition or this instruction, always ask your healthcare professional. [place] [address] any warranty or liability for your use of this information. Patient Education         How to Do Calf Stretches While Sitting (01:04)  Your health professional recommends that you watch this short online health video. Stretch the muscles in the back of your lower leg with this seated calf stretch. How to watch the video    Scan the QR code   OR Visit the website    https://hwi. se/r/Gbqn20e3vmtvr   Current as of: March 2, 2020               Content Version: 12.6  © 3531-1363 [employer]orporated. Care instructions adapted under license by [facility] ([facility]). If you have questions about a medical condition or this instruction, always ask your healthcare professional. [address] any warranty or liability for your use of this information. Acitretin Pregnancy And Lactation Text: This medication is Pregnancy Category X and should not be given to women who are pregnant or may become pregnant in the future. This medication is excreted in breast milk.

## 2023-11-20 ENCOUNTER — OFFICE VISIT (OUTPATIENT)
Dept: FAMILY MEDICINE CLINIC | Age: 33
End: 2023-11-20
Payer: COMMERCIAL

## 2023-11-20 VITALS
HEART RATE: 75 BPM | OXYGEN SATURATION: 98 % | SYSTOLIC BLOOD PRESSURE: 110 MMHG | DIASTOLIC BLOOD PRESSURE: 75 MMHG | TEMPERATURE: 97.3 F

## 2023-11-20 DIAGNOSIS — J06.9 UPPER RESPIRATORY INFECTION WITH COUGH AND CONGESTION: Primary | ICD-10-CM

## 2023-11-20 PROCEDURE — 99213 OFFICE O/P EST LOW 20 MIN: CPT

## 2023-11-20 RX ORDER — BROMPHENIRAMINE MALEATE, PSEUDOEPHEDRINE HYDROCHLORIDE, AND DEXTROMETHORPHAN HYDROBROMIDE 2; 30; 10 MG/5ML; MG/5ML; MG/5ML
5 SYRUP ORAL 4 TIMES DAILY PRN
Qty: 118 ML | Refills: 0 | Status: SHIPPED | OUTPATIENT
Start: 2023-11-20 | End: 2023-11-27

## 2023-11-20 RX ORDER — DOXYCYCLINE HYCLATE 100 MG
100 TABLET ORAL 2 TIMES DAILY
Qty: 20 TABLET | Refills: 0 | Status: SHIPPED | OUTPATIENT
Start: 2023-11-20 | End: 2023-11-30

## 2023-11-20 ASSESSMENT — ENCOUNTER SYMPTOMS
HEMOPTYSIS: 0
HOARSE VOICE: 1
ABDOMINAL PAIN: 0
SINUS PRESSURE: 1
WHEEZING: 0
EYE ITCHING: 0
RHINORRHEA: 1
SORE THROAT: 1
EYE PAIN: 0
SHORTNESS OF BREATH: 1
COUGH: 1
CHEST TIGHTNESS: 0

## 2023-11-20 NOTE — PROGRESS NOTES
desired.  -Instructed to increase fluid intake.   -Suggested humidifier and mist therapy.  -Avoid irritants such as smoke. -Patient agreeable to treatment plan.  -Educational materials provided on AVS.    Return if symptoms worsen or fail to improve. Orders Placed This Encounter   Medications    doxycycline hyclate (VIBRA-TABS) 100 MG tablet     Sig: Take 1 tablet by mouth 2 times daily for 10 days     Dispense:  20 tablet     Refill:  0    brompheniramine-pseudoephedrine-DM 2-30-10 MG/5ML syrup     Sig: Take 5 mLs by mouth 4 times daily as needed for Cough     Dispense:  118 mL     Refill:  0         Patient given educational materials - see patient instructions. Discussed use, benefit, and side effects of prescribed medications. All patient questions answered. Pt voiced understanding.     Electronically signed by LEANDRA Mcclure NP on 11/20/2023 at 9:17 AM

## 2023-12-27 ENCOUNTER — TELEPHONE (OUTPATIENT)
Dept: GASTROENTEROLOGY | Age: 33
End: 2023-12-27

## 2023-12-27 DIAGNOSIS — R10.11 RUQ PAIN: ICD-10-CM

## 2023-12-27 DIAGNOSIS — K76.0 FATTY LIVER: Primary | ICD-10-CM

## 2024-01-12 RX ORDER — PANTOPRAZOLE SODIUM 40 MG/1
TABLET, DELAYED RELEASE ORAL
Qty: 30 TABLET | Refills: 0 | Status: SHIPPED | OUTPATIENT
Start: 2024-01-12

## 2024-02-07 ENCOUNTER — HOSPITAL ENCOUNTER (EMERGENCY)
Age: 34
Discharge: HOME | End: 2024-02-07
Attending: EMERGENCY MEDICINE
Payer: COMMERCIAL

## 2024-02-07 ENCOUNTER — APPOINTMENT (OUTPATIENT)
Dept: CT IMAGING | Age: 34
End: 2024-02-07
Payer: COMMERCIAL

## 2024-02-07 VITALS
HEART RATE: 89 BPM | DIASTOLIC BLOOD PRESSURE: 36 MMHG | WEIGHT: 250 LBS | SYSTOLIC BLOOD PRESSURE: 115 MMHG | TEMPERATURE: 98.2 F | BODY MASS INDEX: 35 KG/M2 | RESPIRATION RATE: 18 BRPM | HEIGHT: 71 IN | OXYGEN SATURATION: 99 %

## 2024-02-07 DIAGNOSIS — R10.30 LOWER ABDOMINAL PAIN: Primary | ICD-10-CM

## 2024-02-07 LAB
ALBUMIN SERPL-MCNC: 4.3 G/DL (ref 3.5–5.2)
ALP SERPL-CCNC: 147 U/L (ref 40–129)
ALT SERPL-CCNC: 37 U/L (ref 5–41)
ANION GAP SERPL CALCULATED.3IONS-SCNC: 12 MMOL/L (ref 9–17)
AST SERPL-CCNC: 17 U/L
BASOPHILS # BLD: 0.1 K/UL (ref 0–0.2)
BASOPHILS NFR BLD: 1 % (ref 0–2)
BILIRUB SERPL-MCNC: 0.5 MG/DL (ref 0.3–1.2)
BILIRUB UR QL STRIP: NEGATIVE
BUN SERPL-MCNC: 17 MG/DL (ref 6–20)
CALCIUM SERPL-MCNC: 9.1 MG/DL (ref 8.6–10.4)
CHLORIDE SERPL-SCNC: 103 MMOL/L (ref 98–107)
CLARITY UR: CLEAR
CO2 SERPL-SCNC: 23 MMOL/L (ref 20–31)
COLOR UR: YELLOW
COMMENT: ABNORMAL
CREAT SERPL-MCNC: 1 MG/DL (ref 0.7–1.2)
EOSINOPHIL # BLD: 0.1 K/UL (ref 0–0.4)
EOSINOPHILS RELATIVE PERCENT: 2 % (ref 0–4)
ERYTHROCYTE [DISTWIDTH] IN BLOOD BY AUTOMATED COUNT: 13.3 % (ref 11.5–14.9)
GFR SERPL CREATININE-BSD FRML MDRD: >60 ML/MIN/1.73M2
GLUCOSE SERPL-MCNC: 108 MG/DL (ref 70–99)
GLUCOSE UR STRIP-MCNC: NEGATIVE MG/DL
HCT VFR BLD AUTO: 46.8 % (ref 41–53)
HGB BLD-MCNC: 15.9 G/DL (ref 13.5–17.5)
HGB UR QL STRIP.AUTO: NEGATIVE
KETONES UR STRIP-MCNC: NEGATIVE MG/DL
LEUKOCYTE ESTERASE UR QL STRIP: NEGATIVE
LIPASE SERPL-CCNC: 19 U/L (ref 13–60)
LYMPHOCYTES NFR BLD: 2.6 K/UL (ref 1–4.8)
LYMPHOCYTES RELATIVE PERCENT: 39 % (ref 24–44)
MCH RBC QN AUTO: 29.2 PG (ref 26–34)
MCHC RBC AUTO-ENTMCNC: 33.9 G/DL (ref 31–37)
MCV RBC AUTO: 86.2 FL (ref 80–100)
MONOCYTES NFR BLD: 0.5 K/UL (ref 0.1–1.3)
MONOCYTES NFR BLD: 8 % (ref 1–7)
NEUTROPHILS NFR BLD: 50 % (ref 36–66)
NEUTS SEG NFR BLD: 3.3 K/UL (ref 1.3–9.1)
NITRITE UR QL STRIP: NEGATIVE
PH UR STRIP: 7.5 [PH] (ref 5–8)
PLATELET # BLD AUTO: 243 K/UL (ref 150–450)
PMV BLD AUTO: 8 FL (ref 6–12)
POTASSIUM SERPL-SCNC: 4 MMOL/L (ref 3.7–5.3)
PROT SERPL-MCNC: 7 G/DL (ref 6.4–8.3)
PROT UR STRIP-MCNC: NEGATIVE MG/DL
RBC # BLD AUTO: 5.43 M/UL (ref 4.5–5.9)
SODIUM SERPL-SCNC: 138 MMOL/L (ref 135–144)
SP GR UR STRIP: 1.05 (ref 1–1.03)
UROBILINOGEN UR STRIP-ACNC: NORMAL EU/DL (ref 0–1)
WBC OTHER # BLD: 6.6 K/UL (ref 3.5–11)

## 2024-02-07 PROCEDURE — 6360000004 HC RX CONTRAST MEDICATION: Performed by: EMERGENCY MEDICINE

## 2024-02-07 PROCEDURE — 99285 EMERGENCY DEPT VISIT HI MDM: CPT

## 2024-02-07 PROCEDURE — 99285 EMERGENCY DEPT VISIT HI MDM: CPT | Performed by: EMERGENCY MEDICINE

## 2024-02-07 PROCEDURE — 36415 COLL VENOUS BLD VENIPUNCTURE: CPT

## 2024-02-07 PROCEDURE — 2580000003 HC RX 258: Performed by: EMERGENCY MEDICINE

## 2024-02-07 PROCEDURE — 6360000002 HC RX W HCPCS: Performed by: EMERGENCY MEDICINE

## 2024-02-07 PROCEDURE — 83690 ASSAY OF LIPASE: CPT

## 2024-02-07 PROCEDURE — 81003 URINALYSIS AUTO W/O SCOPE: CPT

## 2024-02-07 PROCEDURE — 96372 THER/PROPH/DIAG INJ SC/IM: CPT | Performed by: EMERGENCY MEDICINE

## 2024-02-07 PROCEDURE — 80053 COMPREHEN METABOLIC PANEL: CPT

## 2024-02-07 PROCEDURE — 85025 COMPLETE CBC W/AUTO DIFF WBC: CPT

## 2024-02-07 PROCEDURE — 74177 CT ABD & PELVIS W/CONTRAST: CPT

## 2024-02-07 PROCEDURE — 96372 THER/PROPH/DIAG INJ SC/IM: CPT

## 2024-02-07 RX ORDER — SODIUM CHLORIDE 0.9 % (FLUSH) 0.9 %
10 SYRINGE (ML) INJECTION PRN
Status: DISCONTINUED | OUTPATIENT
Start: 2024-02-07 | End: 2024-02-07 | Stop reason: HOSPADM

## 2024-02-07 RX ORDER — DICYCLOMINE HYDROCHLORIDE 10 MG/ML
20 INJECTION INTRAMUSCULAR ONCE
Status: COMPLETED | OUTPATIENT
Start: 2024-02-07 | End: 2024-02-07

## 2024-02-07 RX ORDER — 0.9 % SODIUM CHLORIDE 0.9 %
100 INTRAVENOUS SOLUTION INTRAVENOUS ONCE
Status: COMPLETED | OUTPATIENT
Start: 2024-02-07 | End: 2024-02-07

## 2024-02-07 RX ORDER — IBUPROFEN 800 MG/1
800 TABLET ORAL EVERY 8 HOURS PRN
Qty: 30 TABLET | Refills: 0 | Status: SHIPPED | OUTPATIENT
Start: 2024-02-07

## 2024-02-07 RX ORDER — CYCLOBENZAPRINE HCL 10 MG
10 TABLET ORAL 3 TIMES DAILY PRN
Qty: 21 TABLET | Refills: 0 | Status: SHIPPED | OUTPATIENT
Start: 2024-02-07 | End: 2024-02-17

## 2024-02-07 RX ADMIN — SODIUM CHLORIDE 100 ML: 9 INJECTION, SOLUTION INTRAVENOUS at 11:07

## 2024-02-07 RX ADMIN — SODIUM CHLORIDE, PRESERVATIVE FREE 10 ML: 5 INJECTION INTRAVENOUS at 11:07

## 2024-02-07 RX ADMIN — IOPAMIDOL 75 ML: 755 INJECTION, SOLUTION INTRAVENOUS at 11:07

## 2024-02-07 RX ADMIN — DICYCLOMINE HYDROCHLORIDE 20 MG: 10 INJECTION, SOLUTION INTRAMUSCULAR at 10:31

## 2024-02-07 ASSESSMENT — ENCOUNTER SYMPTOMS
SORE THROAT: 0
BACK PAIN: 0
DIARRHEA: 0
ABDOMINAL PAIN: 1
COUGH: 0
VOMITING: 0
EYE REDNESS: 0
EYE PAIN: 0
RHINORRHEA: 0
NAUSEA: 0
CHEST TIGHTNESS: 0
FACIAL SWELLING: 0
SHORTNESS OF BREATH: 0
TROUBLE SWALLOWING: 0
COLOR CHANGE: 0
BLOOD IN STOOL: 0
WHEEZING: 0
SINUS PRESSURE: 0
EYE DISCHARGE: 0
CONSTIPATION: 0

## 2024-02-07 ASSESSMENT — PAIN SCALES - GENERAL: PAINLEVEL_OUTOF10: 6

## 2024-02-07 ASSESSMENT — PAIN DESCRIPTION - LOCATION: LOCATION: ABDOMEN

## 2024-02-07 ASSESSMENT — PAIN DESCRIPTION - ORIENTATION: ORIENTATION: LOWER

## 2024-02-07 ASSESSMENT — LIFESTYLE VARIABLES
HOW MANY STANDARD DRINKS CONTAINING ALCOHOL DO YOU HAVE ON A TYPICAL DAY: 3 OR 4
HOW OFTEN DO YOU HAVE A DRINK CONTAINING ALCOHOL: 2-4 TIMES A MONTH

## 2024-02-07 ASSESSMENT — PAIN DESCRIPTION - DESCRIPTORS: DESCRIPTORS: DISCOMFORT

## 2024-04-17 RX ORDER — PANTOPRAZOLE SODIUM 40 MG/1
TABLET, DELAYED RELEASE ORAL
Qty: 30 TABLET | Refills: 0 | Status: SHIPPED | OUTPATIENT
Start: 2024-04-17

## 2024-04-22 RX ORDER — FLUOXETINE HYDROCHLORIDE 40 MG/1
40 CAPSULE ORAL DAILY
Qty: 30 CAPSULE | Refills: 11 | Status: SHIPPED | OUTPATIENT
Start: 2024-04-22

## 2024-04-22 NOTE — TELEPHONE ENCOUNTER
Patient requesting refill of fluoxetine.      Medication active on med list yes      Date of last fill: 3/16/23  with 11 refills verified on 4/22/24  verified by MAXINE RN      Date of last appointment 5/26/23    Next Visit Date:  Visit date not found

## 2024-05-11 RX ORDER — PANTOPRAZOLE SODIUM 40 MG/1
TABLET, DELAYED RELEASE ORAL
Qty: 30 TABLET | Refills: 0 | Status: SHIPPED | OUTPATIENT
Start: 2024-05-11

## 2024-05-13 RX ORDER — TOPIRAMATE 100 MG/1
100 TABLET, FILM COATED ORAL 2 TIMES DAILY
Qty: 60 TABLET | Refills: 5 | Status: SHIPPED | OUTPATIENT
Start: 2024-05-13

## 2024-05-13 NOTE — TELEPHONE ENCOUNTER
Pharmacy requesting refill of Topamax 100mg.      Medication active on med list yes      Date of last Rx: 5/26/2023 with 11 refills          verified by KAY GLOVER      Date of last appointment 5/26/2023    Next Visit Date:  7/25/2024

## 2024-05-15 ENCOUNTER — APPOINTMENT (OUTPATIENT)
Dept: GENERAL RADIOLOGY | Age: 34
End: 2024-05-15
Payer: COMMERCIAL

## 2024-05-15 ENCOUNTER — HOSPITAL ENCOUNTER (EMERGENCY)
Age: 34
Discharge: HOME | End: 2024-05-15
Attending: EMERGENCY MEDICINE
Payer: COMMERCIAL

## 2024-05-15 VITALS
OXYGEN SATURATION: 96 % | SYSTOLIC BLOOD PRESSURE: 109 MMHG | DIASTOLIC BLOOD PRESSURE: 65 MMHG | RESPIRATION RATE: 20 BRPM | HEIGHT: 71 IN | WEIGHT: 230 LBS | TEMPERATURE: 98.2 F | BODY MASS INDEX: 32.2 KG/M2 | HEART RATE: 78 BPM

## 2024-05-15 DIAGNOSIS — J02.0 STREPTOCOCCAL SORE THROAT: Primary | ICD-10-CM

## 2024-05-15 LAB
FLUAV RNA RESP QL NAA+PROBE: NOT DETECTED
FLUBV RNA RESP QL NAA+PROBE: NOT DETECTED
SARS-COV-2 RNA RESP QL NAA+PROBE: NOT DETECTED
SOURCE: NORMAL
SPECIMEN DESCRIPTION: NORMAL
SPECIMEN SOURCE: ABNORMAL
STREP A, MOLECULAR: POSITIVE

## 2024-05-15 PROCEDURE — 99284 EMERGENCY DEPT VISIT MOD MDM: CPT

## 2024-05-15 PROCEDURE — 71045 X-RAY EXAM CHEST 1 VIEW: CPT

## 2024-05-15 PROCEDURE — 87651 STREP A DNA AMP PROBE: CPT

## 2024-05-15 PROCEDURE — 87636 SARSCOV2 & INF A&B AMP PRB: CPT

## 2024-05-15 PROCEDURE — 6370000000 HC RX 637 (ALT 250 FOR IP): Performed by: PHYSICIAN ASSISTANT

## 2024-05-15 RX ORDER — ACETAMINOPHEN 325 MG/1
650 TABLET ORAL ONCE
Status: COMPLETED | OUTPATIENT
Start: 2024-05-15 | End: 2024-05-15

## 2024-05-15 RX ORDER — AZITHROMYCIN 500 MG/1
500 TABLET, FILM COATED ORAL DAILY
Qty: 5 TABLET | Refills: 0 | Status: SHIPPED | OUTPATIENT
Start: 2024-05-15 | End: 2024-05-20

## 2024-05-15 RX ORDER — IBUPROFEN 800 MG/1
800 TABLET ORAL ONCE
Status: COMPLETED | OUTPATIENT
Start: 2024-05-15 | End: 2024-05-15

## 2024-05-15 RX ADMIN — IBUPROFEN 800 MG: 800 TABLET, FILM COATED ORAL at 11:52

## 2024-05-15 RX ADMIN — ACETAMINOPHEN 650 MG: 325 TABLET ORAL at 11:52

## 2024-05-15 ASSESSMENT — PAIN - FUNCTIONAL ASSESSMENT: PAIN_FUNCTIONAL_ASSESSMENT: 0-10

## 2024-05-15 ASSESSMENT — LIFESTYLE VARIABLES
HOW OFTEN DO YOU HAVE A DRINK CONTAINING ALCOHOL: MONTHLY OR LESS
HOW MANY STANDARD DRINKS CONTAINING ALCOHOL DO YOU HAVE ON A TYPICAL DAY: 1 OR 2

## 2024-05-15 ASSESSMENT — PAIN DESCRIPTION - LOCATION: LOCATION: THROAT

## 2024-05-15 ASSESSMENT — PAIN SCALES - GENERAL: PAINLEVEL_OUTOF10: 8

## 2024-05-15 ASSESSMENT — PAIN DESCRIPTION - DESCRIPTORS: DESCRIPTORS: SORE

## 2024-05-15 NOTE — ED TRIAGE NOTES
Mode of arrival (squad #, walk in, police, etc) : Walk in        Chief complaint(s): Pharyngitis, nasal congestion        Arrival Note (brief scenario, treatment PTA, etc).: Pt c/o above symptoms since 5/12/24. Pt states cough drops and ibuprofen have not helped relieve the pain. Pt A&Ox4.        C= \"Have you ever felt that you should Cut down on your drinking?\"  No  A= \"Have people Annoyed you by criticizing your drinking?\"  No  G= \"Have you ever felt bad or Guilty about your drinking?\"  No  E= \"Have you ever had a drink as an Eye-opener first thing in the morning to steady your nerves or to help a hangover?\"  No      Deferred []      Reason for deferring: N/A    *If yes to two or more: probable alcohol abuse.*

## 2024-05-15 NOTE — ED PROVIDER NOTES
[facility] ED  eMERGENCY dEPARTMENT eNCOUnter   Independent Attestation     Pt Name: Jeremi Pierce  MRN: [id number]  Birthdate 1990  Date of evaluation: 5/15/24   Jeremi Pierce is a 33 y.o. male who presents with Pharyngitis and Nasal Congestion    Vitals:   Vitals:    05/15/24 1129 05/15/24 1218   BP: 109/65    Pulse: 78    Resp: 20    Temp: 98.2 °F (36.8 °C)    TempSrc: Oral    SpO2:  96%   Weight: 104.3 kg (230 lb)    Height: 1.803 m (5' 11\")      Impression:   1. Streptococcal sore throat      I was personally available for consultation in the Emergency Department. I have reviewed the chart and agree with the documentation as recorded by the MLP, including the assessment, treatment plan and disposition.  Varghese Curry MD  Attending Emergency  Physician                  Varghese Curry MD  05/15/24 6645    
78 20 -- 1.803 m (5' 11\") 104.3 kg (230 lb)       Physical Exam   Nursing note and vitals reviewed.  Constitutional: Oriented to person, place, and time and well-developed, well-nourished.   Head: Normocephalic and atraumatic.   Ear: External ears normal. TM non-erythematous bilaterally with no canal erythema or swelling.  No mastoid tenderness.  No drainage.   Nose: Nose normal and midline.   Eyes: Conjunctivae and EOM are normal. Pupils are equal, round, and reactive to light.   Neck: Normal range of motion. Neck supple.   Throat: Posterior pharynx is injected, tonsils 1+ with no exudates, airway is patent, uvula is midline. Controlling secretions. Normal phonation. No peritonsillar abscess.   Cardiovascular: Normal rate, regular rhythm, normal heart sounds and intact distal pulses.    Pulmonary/Chest: Effort normal and breath sounds normal. No respiratory distress. No wheezes. No rales. No rhonchi.  No chest tenderness.   Abdominal: Soft. Bowel sounds are normal. No distension and no mass. There is no tenderness. There is no rebound and no guarding.   Skin: Skin is warm and dry. No rash noted. No erythema. No pallor.           DIAGNOSTIC RESULTS       RADIOLOGY:   All plain film, CT, MRI, and formal ultrasound images (except ED bedside ultrasound) are read by the radiologist and the images and interpretations are directly viewed by the emergency physician.   XR CHEST PORTABLE   Final Result   No radiographic evidence of acute cardiopulmonary pathology.                     LABS:  Labs Reviewed   RAPID STREP SCREEN - Abnormal; Notable for the following components:       Result Value    Strep A, Molecular POSITIVE (*)     All other components within normal limits   COVID-19 & INFLUENZA COMBO       All other labs were within normal range or not returned as of this dictation.    EMERGENCY DEPARTMENT COURSE and DIFFERENTIAL DIAGNOSIS/MDM:   Vitals:    Vitals:    05/15/24 1129 05/15/24 1218   BP: 109/65    Pulse: 78

## 2024-05-15 NOTE — DISCHARGE INSTRUCTIONS
Recommend close follow up with the PCP.  Return to the ED if you develop any worsening sore throat, chest pain, shortness of breath, fevers, headaches, vomiting, abdominal pain or any other concerning symptoms.

## 2024-06-14 RX ORDER — PANTOPRAZOLE SODIUM 40 MG/1
TABLET, DELAYED RELEASE ORAL
Qty: 30 TABLET | Refills: 0 | Status: SHIPPED | OUTPATIENT
Start: 2024-06-14

## 2024-07-12 RX ORDER — PANTOPRAZOLE SODIUM 40 MG/1
TABLET, DELAYED RELEASE ORAL
Qty: 30 TABLET | Refills: 0 | Status: SHIPPED | OUTPATIENT
Start: 2024-07-12

## 2024-08-13 ENCOUNTER — HOSPITAL ENCOUNTER (EMERGENCY)
Age: 34
Discharge: HOME | End: 2024-08-13
Attending: EMERGENCY MEDICINE
Payer: COMMERCIAL

## 2024-08-13 VITALS
OXYGEN SATURATION: 98 % | SYSTOLIC BLOOD PRESSURE: 149 MMHG | BODY MASS INDEX: 34.87 KG/M2 | TEMPERATURE: 97.8 F | HEART RATE: 71 BPM | DIASTOLIC BLOOD PRESSURE: 91 MMHG | RESPIRATION RATE: 18 BRPM | WEIGHT: 250 LBS

## 2024-08-13 DIAGNOSIS — M54.50 ACUTE MIDLINE LOW BACK PAIN WITHOUT SCIATICA: Primary | ICD-10-CM

## 2024-08-13 PROCEDURE — 6370000000 HC RX 637 (ALT 250 FOR IP): Performed by: EMERGENCY MEDICINE

## 2024-08-13 PROCEDURE — 96372 THER/PROPH/DIAG INJ SC/IM: CPT

## 2024-08-13 PROCEDURE — 6360000002 HC RX W HCPCS: Performed by: EMERGENCY MEDICINE

## 2024-08-13 PROCEDURE — 99284 EMERGENCY DEPT VISIT MOD MDM: CPT

## 2024-08-13 RX ORDER — TIZANIDINE 4 MG/1
4 TABLET ORAL ONCE
Status: COMPLETED | OUTPATIENT
Start: 2024-08-13 | End: 2024-08-13

## 2024-08-13 RX ORDER — LIDOCAINE 4 G/G
1 PATCH TOPICAL ONCE
Status: DISCONTINUED | OUTPATIENT
Start: 2024-08-13 | End: 2024-08-13 | Stop reason: HOSPADM

## 2024-08-13 RX ORDER — LIDOCAINE 50 MG/G
1 PATCH TOPICAL EVERY 24 HOURS
Qty: 30 PATCH | Refills: 0 | Status: SHIPPED | OUTPATIENT
Start: 2024-08-13 | End: 2024-09-12

## 2024-08-13 RX ORDER — DEXAMETHASONE SODIUM PHOSPHATE 4 MG/ML
4 INJECTION, SOLUTION INTRA-ARTICULAR; INTRALESIONAL; INTRAMUSCULAR; INTRAVENOUS; SOFT TISSUE ONCE
Status: COMPLETED | OUTPATIENT
Start: 2024-08-13 | End: 2024-08-13

## 2024-08-13 RX ORDER — KETOROLAC TROMETHAMINE 30 MG/ML
30 INJECTION, SOLUTION INTRAMUSCULAR; INTRAVENOUS ONCE
Status: COMPLETED | OUTPATIENT
Start: 2024-08-13 | End: 2024-08-13

## 2024-08-13 RX ORDER — METHYLPREDNISOLONE 4 MG/1
TABLET ORAL
Qty: 1 KIT | Refills: 0 | Status: SHIPPED | OUTPATIENT
Start: 2024-08-13 | End: 2024-08-19

## 2024-08-13 RX ADMIN — KETOROLAC TROMETHAMINE 30 MG: 30 INJECTION, SOLUTION INTRAMUSCULAR at 10:23

## 2024-08-13 RX ADMIN — DEXAMETHASONE SODIUM PHOSPHATE 4 MG: 4 INJECTION INTRA-ARTICULAR; INTRALESIONAL; INTRAMUSCULAR; INTRAVENOUS; SOFT TISSUE at 10:24

## 2024-08-13 RX ADMIN — TIZANIDINE 4 MG: 4 TABLET ORAL at 10:31

## 2024-08-13 ASSESSMENT — ENCOUNTER SYMPTOMS
EYE REDNESS: 0
TROUBLE SWALLOWING: 0
WHEEZING: 0
EYE DISCHARGE: 0
DIARRHEA: 0
COUGH: 0
COLOR CHANGE: 0
BACK PAIN: 1
SINUS PRESSURE: 0
NAUSEA: 0
VOMITING: 0
BLOOD IN STOOL: 0
SHORTNESS OF BREATH: 0
CONSTIPATION: 0
ABDOMINAL PAIN: 0
FACIAL SWELLING: 0
EYE PAIN: 0
SORE THROAT: 0
CHEST TIGHTNESS: 0
RHINORRHEA: 0

## 2024-08-13 ASSESSMENT — PAIN SCALES - GENERAL
PAINLEVEL_OUTOF10: 4
PAINLEVEL_OUTOF10: 4

## 2024-08-13 ASSESSMENT — LIFESTYLE VARIABLES
HOW MANY STANDARD DRINKS CONTAINING ALCOHOL DO YOU HAVE ON A TYPICAL DAY: 1 OR 2
HOW OFTEN DO YOU HAVE A DRINK CONTAINING ALCOHOL: MONTHLY OR LESS

## 2024-08-13 NOTE — ED PROVIDER NOTES
eMERGENCY dEPARTMENT eNCOUnter      Pt Name: Jeremi Pierce  MRN: [id number]  Birthdate 1990  Date of evaluation: 8/13/24      CHIEF COMPLAINT       Chief Complaint   Patient presents with    Back Pain         HISTORY OF PRESENT ILLNESS    Jeremi Pierce is a 33 y.o. male who presents complaining of back pain.  Patient is here stating that yesterday he started having lower midline back pain.  Patient went to the urgent care got a shot of Toradol and a prescription of Flexeril and states that is not helping at all.  Patient states that with certain motions he does get that sharp stabbing pain in his lower back.  Patient denies any numbness tingling weakness or radiation of the pain into his legs.  Patient has no bowel or bladder incontinence or retention.      REVIEW OF SYSTEMS       Review of Systems   Constitutional:  Negative for activity change, appetite change, chills, diaphoresis and fever.   HENT:  Negative for congestion, ear pain, facial swelling, nosebleeds, rhinorrhea, sinus pressure, sore throat and trouble swallowing.    Eyes:  Negative for pain, discharge and redness.   Respiratory:  Negative for cough, chest tightness, shortness of breath and wheezing.    Cardiovascular:  Negative for chest pain, palpitations and leg swelling.   Gastrointestinal:  Negative for abdominal pain, blood in stool, constipation, diarrhea, nausea and vomiting.   Genitourinary:  Negative for difficulty urinating, dysuria, flank pain, frequency, genital sores and hematuria.   Musculoskeletal:  Positive for back pain. Negative for arthralgias, gait problem, joint swelling, myalgias and neck pain.   Skin:  Negative for color change, pallor, rash and wound.   Neurological:  Negative for dizziness, tremors, seizures, syncope, speech difficulty, weakness, numbness and headaches.   Psychiatric/Behavioral:  Negative for confusion, decreased concentration, hallucinations, self-injury, sleep disturbance and suicidal ideas.

## 2024-08-17 RX ORDER — PANTOPRAZOLE SODIUM 40 MG/1
TABLET, DELAYED RELEASE ORAL
Qty: 30 TABLET | Refills: 0 | OUTPATIENT
Start: 2024-08-17

## 2024-08-19 ENCOUNTER — TELEPHONE (OUTPATIENT)
Dept: GASTROENTEROLOGY | Age: 34
End: 2024-08-19

## 2024-08-19 RX ORDER — PANTOPRAZOLE SODIUM 40 MG/1
TABLET, DELAYED RELEASE ORAL
Qty: 30 TABLET | Refills: 0 | OUTPATIENT
Start: 2024-08-19

## 2024-08-19 NOTE — TELEPHONE ENCOUNTER
Patient lvm in regards to med refill, has not been seen since 2023 by Dr Mccauley, will need new provider & OV

## 2024-08-30 ENCOUNTER — OFFICE VISIT (OUTPATIENT)
Dept: FAMILY MEDICINE CLINIC | Age: 34
End: 2024-08-30

## 2024-08-30 VITALS
SYSTOLIC BLOOD PRESSURE: 134 MMHG | WEIGHT: 240 LBS | DIASTOLIC BLOOD PRESSURE: 89 MMHG | HEART RATE: 73 BPM | OXYGEN SATURATION: 98 % | TEMPERATURE: 97.3 F | BODY MASS INDEX: 33.47 KG/M2

## 2024-08-30 DIAGNOSIS — K21.9 GASTROESOPHAGEAL REFLUX DISEASE, UNSPECIFIED WHETHER ESOPHAGITIS PRESENT: ICD-10-CM

## 2024-08-30 DIAGNOSIS — Z76.0 MEDICATION REFILL: Primary | ICD-10-CM

## 2024-08-30 RX ORDER — CYCLOBENZAPRINE HCL 10 MG
TABLET ORAL
COMMUNITY
Start: 2024-08-12

## 2024-08-30 RX ORDER — KETOROLAC TROMETHAMINE 30 MG/ML
INJECTION, SOLUTION INTRAMUSCULAR; INTRAVENOUS
COMMUNITY
Start: 2020-01-06

## 2024-08-30 RX ORDER — PANTOPRAZOLE SODIUM 40 MG/1
40 TABLET, DELAYED RELEASE ORAL DAILY
Qty: 30 TABLET | Refills: 0 | Status: SHIPPED | OUTPATIENT
Start: 2024-08-30 | End: 2024-09-29

## 2024-08-30 ASSESSMENT — ENCOUNTER SYMPTOMS
NAUSEA: 0
CHANGE IN BOWEL HABIT: 0
COUGH: 0
VOMITING: 0
GASTROINTESTINAL NEGATIVE: 1
ABDOMINAL PAIN: 0

## 2024-08-30 NOTE — PROGRESS NOTES
[facility]  [address]  [address]  [facility] [address]  Dept: [phone]  Dept Fax: [phone]    Jeremi Pierce is a 33 y.o. male who presents to the urgent care today for his medical conditions/complaints as notedbelow.  Jeremi Pierce is c/o of Medication Refill (PT needs a refill on pantoprazole 40mg )      HPI:     33 yr old male presents for medication refill on protonix, follows with GI, Dr. Mccartney left practice, has yet to set up new appt with Dr. Weiss  Has new PCP appt in sept  Feels well, has 8 protonix left yet    Medication Refill  This is a new problem. The current episode started today. The problem occurs constantly. The problem has been unchanged. Pertinent negatives include no abdominal pain, anorexia, change in bowel habit, coughing, fever, nausea or vomiting. Nothing aggravates the symptoms. He has tried nothing for the symptoms. The treatment provided no relief.       Past Medical History:   Diagnosis Date    Abdominal pain     Anxiety     Bloating     Constipation     GERD (gastroesophageal reflux disease)     Headache     Migraines     Panic attacks     Poor venous access     Rectal bleeding     Sleep apnea     MILD, HAS CPAP BUT DOESN'T USE.    Sleep apnea     wears CPAP        Current Outpatient Medications   Medication Sig Dispense Refill    ketorolac (TORADOL) 60 MG/2ML injection Inject 2 mL by intramuscular route.      lidocaine (LIDODERM) 5 % Place 1 patch onto the skin every 24 hours Place 1 patch onto the skin daily 12 hours on, 12 hours off. 30 patch 0    pantoprazole (PROTONIX) 40 MG tablet TAKE 1 TABLET BY MOUTH ONCE DAILY IN THE MORNING BEFORE BREAKFAST 30 tablet 0    topiramate (TOPAMAX) 100 MG tablet Take 1 tablet by mouth twice daily 60 tablet 5    FLUoxetine (PROZAC) 40 MG capsule Take 1 capsule by mouth daily 30 capsule 11    ibuprofen (ADVIL;MOTRIN) 800 MG tablet Take 1  There is no distension.      Palpations: Abdomen is soft. There is no mass.      Tenderness: There is no abdominal tenderness. There is no guarding or rebound.      Hernia: No hernia is present.   Musculoskeletal:      Cervical back: Neck supple.      Comments: Gait steady   Skin:     General: Skin is warm and dry.   Neurological:      General: No focal deficit present.      Mental Status: He is alert.   Psychiatric:         Mood and Affect: Mood normal.       /89   Pulse 73   Temp 97.3 °F (36.3 °C)   Wt 108.9 kg (240 lb)   SpO2 98%   BMI 33.47 kg/m²     Assessment:   Assessment & Plan    Diagnosis Orders   1. Medication refill        2. Gastroesophageal reflux disease, unspecified whether esophagitis present            Plan:      Here for protonix refill, has 8 left, follows with GI, Dr. Almendarez left practice and now needs to f/u with new GI Dr.   Will run out of Protonix prior to appt in Sept  1 month refill provided  GERD diet reviewed  Continue to take protonix daily at least 30 minutes prior to breakfast  Reviewed over-the-counter treatments for symptom management.  Return worse  Pt verbalized agreement and understanding of POC    Return for f/u GI.    No orders of the defined types were placed in this encounter.        Patient given educational materials - see patient instructions.  Discussed use, benefit, and side effects of prescribed medications.  All patient questions answered.  Pt voicedunderstanding.    Electronically signed by LEANDRA Sabillon CNP on 8/30/2024 at 11:26 AM

## 2024-09-13 ENCOUNTER — TELEPHONE (OUTPATIENT)
Dept: INTERNAL MEDICINE CLINIC | Age: 34
End: 2024-09-13

## 2024-09-23 RX ORDER — PANTOPRAZOLE SODIUM 40 MG/1
40 TABLET, DELAYED RELEASE ORAL DAILY
Qty: 30 TABLET | Refills: 0 | OUTPATIENT
Start: 2024-09-23

## 2024-09-27 ENCOUNTER — TELEPHONE (OUTPATIENT)
Dept: INTERNAL MEDICINE CLINIC | Age: 34
End: 2024-09-27

## 2024-11-08 RX ORDER — TOPIRAMATE 100 MG/1
100 TABLET, FILM COATED ORAL 2 TIMES DAILY
Qty: 60 TABLET | Refills: 0 | OUTPATIENT
Start: 2024-11-08

## 2024-11-20 DIAGNOSIS — G47.33 OSA (OBSTRUCTIVE SLEEP APNEA): ICD-10-CM

## 2024-11-20 NOTE — TELEPHONE ENCOUNTER
Patient called into the office about his trazodone prescription that was previously prescribed by Caprice Huff CNP. The last prescription that was sent into the pharmacy by Caprice was on 3/16/2023 for a year supply. I asked the patient if he had still been receiving this medication through a different provider and he stated that he had not. I advised him that if he had been taking it as prescribed he should have run out in 3/2024. I also advised him that I would reach out to the pharmacy to clarify.    I contacted [facility] on [address] in [place], [place] and spoke with Yvette. She states that the last prescription that was picked up by the patient was on 1/23/2024 and it has not been picked up or transferred anywhere else since.     Patient has not been seen in the office since 5/2023 by Dr. Jones and does not have an appointment scheduled until 4/3/2025.    He is still wanting this medication. Please advise.

## 2024-11-22 NOTE — TELEPHONE ENCOUNTER
Pt called back in asking about this. He was wondering if he was able to get this sent in?    Please advise, thanks.

## 2024-11-25 ENCOUNTER — HOSPITAL ENCOUNTER (EMERGENCY)
Age: 34
Discharge: HOME | End: 2024-11-25
Attending: STUDENT IN AN ORGANIZED HEALTH CARE EDUCATION/TRAINING PROGRAM
Payer: COMMERCIAL

## 2024-11-25 ENCOUNTER — APPOINTMENT (OUTPATIENT)
Dept: GENERAL RADIOLOGY | Age: 34
End: 2024-11-25
Payer: COMMERCIAL

## 2024-11-25 VITALS
HEIGHT: 71 IN | TEMPERATURE: 98.1 F | HEART RATE: 73 BPM | OXYGEN SATURATION: 98 % | BODY MASS INDEX: 32.2 KG/M2 | DIASTOLIC BLOOD PRESSURE: 83 MMHG | RESPIRATION RATE: 22 BRPM | WEIGHT: 230 LBS | SYSTOLIC BLOOD PRESSURE: 144 MMHG

## 2024-11-25 DIAGNOSIS — J98.11 ATELECTASIS: ICD-10-CM

## 2024-11-25 DIAGNOSIS — R07.89 ATYPICAL CHEST PAIN: Primary | ICD-10-CM

## 2024-11-25 LAB
ANION GAP SERPL CALCULATED.3IONS-SCNC: 15 MMOL/L (ref 9–16)
BASOPHILS # BLD: 0.1 K/UL (ref 0–0.2)
BASOPHILS NFR BLD: 1 % (ref 0–2)
BUN SERPL-MCNC: 14 MG/DL (ref 6–20)
CALCIUM SERPL-MCNC: 9 MG/DL (ref 8.6–10.4)
CHLORIDE SERPL-SCNC: 103 MMOL/L (ref 98–107)
CO2 SERPL-SCNC: 20 MMOL/L (ref 20–31)
CREAT SERPL-MCNC: 1.2 MG/DL (ref 0.7–1.2)
EOSINOPHIL # BLD: 0.1 K/UL (ref 0–0.4)
EOSINOPHILS RELATIVE PERCENT: 1 % (ref 0–4)
ERYTHROCYTE [DISTWIDTH] IN BLOOD BY AUTOMATED COUNT: 13.3 % (ref 11.5–14.9)
GFR, ESTIMATED: 82 ML/MIN/1.73M2
GLUCOSE SERPL-MCNC: 128 MG/DL (ref 74–99)
HCT VFR BLD AUTO: 44.6 % (ref 41–53)
HGB BLD-MCNC: 14.9 G/DL (ref 13.5–17.5)
LYMPHOCYTES NFR BLD: 3.2 K/UL (ref 1–4.8)
LYMPHOCYTES RELATIVE PERCENT: 31 % (ref 24–44)
MAGNESIUM SERPL-MCNC: 1.9 MG/DL (ref 1.6–2.6)
MCH RBC QN AUTO: 29.1 PG (ref 26–34)
MCHC RBC AUTO-ENTMCNC: 33.4 G/DL (ref 31–37)
MCV RBC AUTO: 87.2 FL (ref 80–100)
MONOCYTES NFR BLD: 0.8 K/UL (ref 0.1–1.3)
MONOCYTES NFR BLD: 8 % (ref 1–7)
NEUTROPHILS NFR BLD: 59 % (ref 36–66)
NEUTS SEG NFR BLD: 6.4 K/UL (ref 1.3–9.1)
PLATELET # BLD AUTO: 288 K/UL (ref 150–450)
PMV BLD AUTO: 8 FL (ref 6–12)
POTASSIUM SERPL-SCNC: 3.7 MMOL/L (ref 3.7–5.3)
RBC # BLD AUTO: 5.12 M/UL (ref 4.5–5.9)
SODIUM SERPL-SCNC: 138 MMOL/L (ref 136–145)
TROPONIN I SERPL HS-MCNC: 9 NG/L (ref 0–22)
WBC OTHER # BLD: 10.6 K/UL (ref 3.5–11)

## 2024-11-25 PROCEDURE — 84484 ASSAY OF TROPONIN QUANT: CPT

## 2024-11-25 PROCEDURE — 2500000003 HC RX 250 WO HCPCS: Performed by: STUDENT IN AN ORGANIZED HEALTH CARE EDUCATION/TRAINING PROGRAM

## 2024-11-25 PROCEDURE — 93005 ELECTROCARDIOGRAM TRACING: CPT | Performed by: STUDENT IN AN ORGANIZED HEALTH CARE EDUCATION/TRAINING PROGRAM

## 2024-11-25 PROCEDURE — 83735 ASSAY OF MAGNESIUM: CPT

## 2024-11-25 PROCEDURE — 71046 X-RAY EXAM CHEST 2 VIEWS: CPT

## 2024-11-25 PROCEDURE — 96365 THER/PROPH/DIAG IV INF INIT: CPT

## 2024-11-25 PROCEDURE — 36415 COLL VENOUS BLD VENIPUNCTURE: CPT

## 2024-11-25 PROCEDURE — 80048 BASIC METABOLIC PNL TOTAL CA: CPT

## 2024-11-25 PROCEDURE — 99285 EMERGENCY DEPT VISIT HI MDM: CPT

## 2024-11-25 PROCEDURE — 85025 COMPLETE CBC W/AUTO DIFF WBC: CPT

## 2024-11-25 RX ORDER — MAGNESIUM SULFATE HEPTAHYDRATE 40 MG/ML
2000 INJECTION, SOLUTION INTRAVENOUS ONCE
Status: COMPLETED | OUTPATIENT
Start: 2024-11-25 | End: 2024-11-25

## 2024-11-25 RX ORDER — NAPROXEN 500 MG/1
500 TABLET ORAL 2 TIMES DAILY PRN
Qty: 28 TABLET | Refills: 0 | Status: SHIPPED | OUTPATIENT
Start: 2024-11-25 | End: 2024-12-09

## 2024-11-25 RX ADMIN — MAGNESIUM SULFATE HEPTAHYDRATE 2000 MG: 40 INJECTION, SOLUTION INTRAVENOUS at 19:19

## 2024-11-25 ASSESSMENT — HEART SCORE: ECG: NORMAL

## 2024-11-25 ASSESSMENT — ENCOUNTER SYMPTOMS
VOMITING: 0
NAUSEA: 0
SHORTNESS OF BREATH: 0
ABDOMINAL PAIN: 0
COUGH: 0

## 2024-11-25 NOTE — ED NOTES
The following labs labeled with pt sticker and tubed to lab:     [x] Blue     [x] Lavender   [] on ice  [x] Green/yellow  [] Green/black [] on ice  [] Yellow  [] Red  [] Pink      [] COVID-19 swab    [] Rapid  [] PCR  [] Peds Viral Panel     [] Urine Sample  [] Pelvic Cultures  [] Blood Cultures          Was A Bandage Applied: Yes

## 2024-11-25 NOTE — ED PROVIDER NOTES
1          Diagnoses Considered but Do Not Suspect: PE        3)  Treatment and Disposition          Patient repeat assessment, shared decision making, and disposition discussion: Lab work, chest x-ray completed.  Likely atelectasis bilaterally.  Did have patient using incentive spirometer, he was only able to pull about 1700 cc.  After a few other breaths he was able to get up over 2 L.  He states this actually did help.  Atelectasis, possible pleurisy, musculoskeletal component to patient's pain.  Magnesium was low normal and supplemented.  Will plan to discharge to follow-up with the PCP.      I have reviewed discharge instructions with the patient.  The patient verbalized understanding.      MIPS:  [None]    Social determinants of health impacting treatment or disposition:  none    Code Status Discussion:  Did not have Code status discussion since patient being discharged      PROCEDURES:  none    CONSULTS:  None    CRITICAL CARE:  There was a high probability of clinically significant/life threatening deterioration in this patient's condition which required my urgent intervention.  Total critical care time was 10 minutes.  This excludes any time for separately reportable procedures.     FINAL IMPRESSION     1. Atypical chest pain    2. Atelectasis          DISPOSITION / PLAN   DISPOSITION Decision To Discharge 11/25/2024 07:27:30 PM           Evaluation and treatment course in the ED, and plan of care upon discharge was discussed in length with the patient/patient representative. The patient/patient representative understands that at this time there is no evidence for a more malignant underlying process, but also understands that early in the process of an illness or injury, an emergency department work-up can be falsely reassuring. Patient/patient representative had no further questions prior to being discharged and understands that worsening, changing or persistent symptoms should prompt a immediate call or

## 2024-11-25 NOTE — ED TRIAGE NOTES
Mode of arrival (squad #, walk in, police, etc) : walk in        Chief complaint(s): Chest pain        Arrival Note (brief scenario, treatment PTA, etc).: Chest pain, tightness and fluttering x 1.5 weeks. Patient states his has bilateral hand numbness and left leg numbness with the episodes. Pt denies any recent illness, states he has SOB intermittently.         C= \"Have you ever felt that you should Cut down on your drinking?\"  No  A= \"Have people Annoyed you by criticizing your drinking?\"  No  G= \"Have you ever felt bad or Guilty about your drinking?\"  No  E= \"Have you ever had a drink as an Eye-opener first thing in the morning to steady your nerves or to help a hangover?\"  No      Deferred []      Reason for deferring: N/A    *If yes to two or more: probable alcohol abuse.*

## 2024-11-26 LAB
EKG ATRIAL RATE: 66 BPM
EKG P AXIS: -24 DEGREES
EKG P-R INTERVAL: 124 MS
EKG Q-T INTERVAL: 410 MS
EKG QRS DURATION: 82 MS
EKG QTC CALCULATION (BAZETT): 429 MS
EKG R AXIS: -16 DEGREES
EKG T AXIS: -16 DEGREES
EKG VENTRICULAR RATE: 66 BPM

## 2024-11-26 PROCEDURE — 93010 ELECTROCARDIOGRAM REPORT: CPT | Performed by: INTERNAL MEDICINE

## 2024-11-26 RX ORDER — TRAZODONE HYDROCHLORIDE 150 MG/1
150 TABLET ORAL NIGHTLY
Qty: 30 TABLET | Refills: 11 | Status: SHIPPED | OUTPATIENT
Start: 2024-11-26 | End: 2025-11-21

## 2024-11-26 NOTE — DISCHARGE INSTRUCTIONS
Please follow-up and establish care with a primary care provider  Please take the naproxen as prescribed  Return to the ER with any severe worsening of symptoms that you cannot control  Please use the incentive spirometer as prescribed-10 deep breaths every hour while you are awake until you are able to do it without any difficulty whatsoever and your symptoms must be gone

## 2024-11-26 NOTE — ED NOTES
Report given to Sondra MEANS RN from ED .   Report method in person   The following was reviewed with receiving RN:   Current vital signs:  BP (!) 140/81   Pulse 67   Temp 98.1 °F (36.7 °C) (Oral)   Resp 21   Ht 1.803 m (5' 11\")   Wt 104.3 kg (230 lb)   SpO2 98%   BMI 32.08 kg/m²                      Any medication or safety alerts were reviewed. Any pending diagnostics and notifications were also reviewed, as well as any safety concerns or issues, abnormal labs, abnormal imaging, and abnormal assessment findings. Questions were answered.

## 2025-01-27 ENCOUNTER — OFFICE VISIT (OUTPATIENT)
Dept: INTERNAL MEDICINE CLINIC | Age: 35
End: 2025-01-27
Payer: COMMERCIAL

## 2025-01-27 VITALS
OXYGEN SATURATION: 97 % | HEIGHT: 71 IN | BODY MASS INDEX: 35.28 KG/M2 | DIASTOLIC BLOOD PRESSURE: 74 MMHG | WEIGHT: 252 LBS | SYSTOLIC BLOOD PRESSURE: 136 MMHG | HEART RATE: 73 BPM

## 2025-01-27 DIAGNOSIS — R07.9 CHEST PAIN, UNSPECIFIED TYPE: ICD-10-CM

## 2025-01-27 DIAGNOSIS — M48.07 NEURAL FORAMINAL STENOSIS OF LUMBOSACRAL SPINE: ICD-10-CM

## 2025-01-27 DIAGNOSIS — E66.812 CLASS 2 SEVERE OBESITY DUE TO EXCESS CALORIES WITH SERIOUS COMORBIDITY AND BODY MASS INDEX (BMI) OF 35.0 TO 35.9 IN ADULT: ICD-10-CM

## 2025-01-27 DIAGNOSIS — G89.29 CHRONIC MIDLINE LOW BACK PAIN, UNSPECIFIED WHETHER SCIATICA PRESENT: ICD-10-CM

## 2025-01-27 DIAGNOSIS — Z76.89 ENCOUNTER TO ESTABLISH CARE: ICD-10-CM

## 2025-01-27 DIAGNOSIS — E66.01 CLASS 2 SEVERE OBESITY DUE TO EXCESS CALORIES WITH SERIOUS COMORBIDITY AND BODY MASS INDEX (BMI) OF 35.0 TO 35.9 IN ADULT: ICD-10-CM

## 2025-01-27 DIAGNOSIS — R42 DIZZINESS: ICD-10-CM

## 2025-01-27 DIAGNOSIS — E55.9 VITAMIN D DEFICIENCY: ICD-10-CM

## 2025-01-27 DIAGNOSIS — G47.33 OSA (OBSTRUCTIVE SLEEP APNEA): ICD-10-CM

## 2025-01-27 DIAGNOSIS — G43.009 MIGRAINE WITHOUT AURA AND WITHOUT STATUS MIGRAINOSUS, NOT INTRACTABLE: Primary | ICD-10-CM

## 2025-01-27 DIAGNOSIS — M54.50 CHRONIC MIDLINE LOW BACK PAIN, UNSPECIFIED WHETHER SCIATICA PRESENT: ICD-10-CM

## 2025-01-27 DIAGNOSIS — E66.01 SEVERE OBESITY (BMI 35.0-39.9) WITH COMORBIDITY: ICD-10-CM

## 2025-01-27 PROCEDURE — 99204 OFFICE O/P NEW MOD 45 MIN: CPT

## 2025-01-27 RX ORDER — MAGNESIUM 30 MG
30 TABLET ORAL 2 TIMES DAILY
COMMUNITY

## 2025-01-27 SDOH — ECONOMIC STABILITY: FOOD INSECURITY: WITHIN THE PAST 12 MONTHS, THE FOOD YOU BOUGHT JUST DIDN'T LAST AND YOU DIDN'T HAVE MONEY TO GET MORE.: PATIENT DECLINED

## 2025-01-27 SDOH — ECONOMIC STABILITY: FOOD INSECURITY: WITHIN THE PAST 12 MONTHS, YOU WORRIED THAT YOUR FOOD WOULD RUN OUT BEFORE YOU GOT MONEY TO BUY MORE.: PATIENT DECLINED

## 2025-01-27 ASSESSMENT — PATIENT HEALTH QUESTIONNAIRE - PHQ9
SUM OF ALL RESPONSES TO PHQ QUESTIONS 1-9: 0
1. LITTLE INTEREST OR PLEASURE IN DOING THINGS: NOT AT ALL
SUM OF ALL RESPONSES TO PHQ9 QUESTIONS 1 & 2: 0
SUM OF ALL RESPONSES TO PHQ QUESTIONS 1-9: 0
2. FEELING DOWN, DEPRESSED OR HOPELESS: NOT AT ALL

## 2025-01-27 NOTE — PROGRESS NOTES
\"Have you been to the ER, urgent care clinic since your last visit?  Hospitalized since your last visit?\"    Anju 11/25/24 Chest Pain    “Have you seen or consulted any other health care providers outside our system since your last visit?”    NO

## 2025-01-27 NOTE — PROGRESS NOTES
[facility]  [address] [address]  Dept: [phone]  Dept Fax: [phone]    OFFICE VISIT NOTE  Date of patient's visit: 1/30/2025  Patient's Name:  Jeremi Pierce YOB: 1990            Patient Care Team:  Ortiz Anderson MD as PCP - General (Internal Medicine)  Ortiz Anderson MD as PCP - Empaneled Provider  _________________________________________    ________________________________________________  Chief Complaint:   New Patient, Abdominal Pain (Unable to localize pain, pain comes and goes, eating does not effect it, makes him nauseous ), Back Pain, and Dizziness (Last incident was about 1 month ago, blurred vision, tingling sensation throughout body, no LOC )    _______________________________________________  History of Presenting Illness:  History was obtained from the patient. Jeremi Pierce is a 34 y.o. male.     Establish care with me.     Dizziness   States more at work  Not positional  No chest pain today, but sometimes associated with occasional chest cramping.    No palpitations.    Mildly elevated ALP  Fatty liver on ultrasound.    Hyperlipidemia.  Active lifestyle and daily exercise.    Chronic back pain.  Tolerable.  Last x-ray lumbar spine showed mild degenerative changes at L5-S1.    ELIZABETH.  Was on CPAP in the past  Needs new equipment.  Follows up with sleep specialist.    Lab Results   Component Value Date    HGB 16.0 01/29/2025    LABA1C 5.3 01/29/2025    CHOL 254 (H) 01/29/2025    HDL 36 (L) 01/29/2025     (H) 01/29/2025    TRIG 241 (H) 01/29/2025    VITD25 17.3 (L) 01/29/2025    CREATININE 1.3 (H) 01/29/2025     _____________________________________________________  Past Medical/Surgical History:        Diagnosis Date    Abdominal pain     Anxiety     Bloating     Constipation     GERD (gastroesophageal reflux disease)     Headache     Migraines     Panic attacks     Poor venous access     Rectal

## 2025-01-29 ENCOUNTER — HOSPITAL ENCOUNTER (OUTPATIENT)
Dept: GENERAL RADIOLOGY | Age: 35
Discharge: HOME | End: 2025-01-31
Payer: COMMERCIAL

## 2025-01-29 ENCOUNTER — HOSPITAL ENCOUNTER (OUTPATIENT)
Age: 35
Discharge: HOME | End: 2025-01-29
Payer: COMMERCIAL

## 2025-01-29 ENCOUNTER — HOSPITAL ENCOUNTER (OUTPATIENT)
Age: 35
Discharge: HOME | End: 2025-01-31
Payer: COMMERCIAL

## 2025-01-29 DIAGNOSIS — E55.9 VITAMIN D DEFICIENCY: ICD-10-CM

## 2025-01-29 DIAGNOSIS — E66.01 CLASS 2 SEVERE OBESITY DUE TO EXCESS CALORIES WITH SERIOUS COMORBIDITY AND BODY MASS INDEX (BMI) OF 35.0 TO 35.9 IN ADULT: ICD-10-CM

## 2025-01-29 DIAGNOSIS — E66.01 SEVERE OBESITY (BMI 35.0-39.9) WITH COMORBIDITY: ICD-10-CM

## 2025-01-29 DIAGNOSIS — R07.9 CHEST PAIN, UNSPECIFIED TYPE: ICD-10-CM

## 2025-01-29 DIAGNOSIS — E66.812 CLASS 2 SEVERE OBESITY DUE TO EXCESS CALORIES WITH SERIOUS COMORBIDITY AND BODY MASS INDEX (BMI) OF 35.0 TO 35.9 IN ADULT: ICD-10-CM

## 2025-01-29 DIAGNOSIS — G47.33 OSA (OBSTRUCTIVE SLEEP APNEA): ICD-10-CM

## 2025-01-29 DIAGNOSIS — G89.29 CHRONIC MIDLINE LOW BACK PAIN, UNSPECIFIED WHETHER SCIATICA PRESENT: ICD-10-CM

## 2025-01-29 DIAGNOSIS — M54.50 CHRONIC MIDLINE LOW BACK PAIN, UNSPECIFIED WHETHER SCIATICA PRESENT: ICD-10-CM

## 2025-01-29 LAB
25(OH)D3 SERPL-MCNC: 17.3 NG/ML (ref 30–100)
ALBUMIN SERPL-MCNC: 4.3 G/DL (ref 3.5–5.2)
ALP SERPL-CCNC: 147 U/L (ref 40–129)
ALT SERPL-CCNC: 51 U/L (ref 10–50)
ANION GAP SERPL CALCULATED.3IONS-SCNC: 10 MMOL/L (ref 9–16)
AST SERPL-CCNC: 22 U/L (ref 10–50)
BASOPHILS # BLD: 0.1 K/UL (ref 0–0.2)
BASOPHILS NFR BLD: 1 % (ref 0–2)
BILIRUB SERPL-MCNC: 0.4 MG/DL (ref 0–1.2)
BUN SERPL-MCNC: 18 MG/DL (ref 6–20)
CALCIUM SERPL-MCNC: 9.4 MG/DL (ref 8.6–10.4)
CHLORIDE SERPL-SCNC: 106 MMOL/L (ref 98–107)
CHOLEST SERPL-MCNC: 254 MG/DL (ref 0–199)
CHOLESTEROL/HDL RATIO: 7.1
CO2 SERPL-SCNC: 26 MMOL/L (ref 20–31)
CREAT SERPL-MCNC: 1.3 MG/DL (ref 0.7–1.2)
EOSINOPHIL # BLD: 0.1 K/UL (ref 0–0.4)
EOSINOPHILS RELATIVE PERCENT: 2 % (ref 0–4)
ERYTHROCYTE [DISTWIDTH] IN BLOOD BY AUTOMATED COUNT: 13.2 % (ref 11.5–14.9)
EST. AVERAGE GLUCOSE BLD GHB EST-MCNC: 105 MG/DL
GFR, ESTIMATED: 74 ML/MIN/1.73M2
GLUCOSE SERPL-MCNC: 112 MG/DL (ref 74–99)
HBA1C MFR BLD: 5.3 % (ref 4–6)
HCT VFR BLD AUTO: 47.6 % (ref 41–53)
HDLC SERPL-MCNC: 36 MG/DL
HGB BLD-MCNC: 16 G/DL (ref 13.5–17.5)
LDLC SERPL CALC-MCNC: 170 MG/DL (ref 0–100)
LYMPHOCYTES NFR BLD: 2.9 K/UL (ref 1–4.8)
LYMPHOCYTES RELATIVE PERCENT: 41 % (ref 24–44)
MCH RBC QN AUTO: 28.9 PG (ref 26–34)
MCHC RBC AUTO-ENTMCNC: 33.7 G/DL (ref 31–37)
MCV RBC AUTO: 85.7 FL (ref 80–100)
MONOCYTES NFR BLD: 0.6 K/UL (ref 0.1–1.3)
MONOCYTES NFR BLD: 9 % (ref 1–7)
NEUTROPHILS NFR BLD: 47 % (ref 36–66)
NEUTS SEG NFR BLD: 3.4 K/UL (ref 1.3–9.1)
PLATELET # BLD AUTO: 262 K/UL (ref 150–450)
PMV BLD AUTO: 8.1 FL (ref 6–12)
POTASSIUM SERPL-SCNC: 4.2 MMOL/L (ref 3.7–5.3)
PROT SERPL-MCNC: 7.3 G/DL (ref 6.6–8.7)
RBC # BLD AUTO: 5.56 M/UL (ref 4.5–5.9)
SODIUM SERPL-SCNC: 142 MMOL/L (ref 136–145)
TRIGL SERPL-MCNC: 241 MG/DL (ref 0–149)
WBC OTHER # BLD: 7.1 K/UL (ref 3.5–11)

## 2025-01-29 PROCEDURE — 82306 VITAMIN D 25 HYDROXY: CPT

## 2025-01-29 PROCEDURE — 72072 X-RAY EXAM THORAC SPINE 3VWS: CPT

## 2025-01-29 PROCEDURE — 80061 LIPID PANEL: CPT

## 2025-01-29 PROCEDURE — 83036 HEMOGLOBIN GLYCOSYLATED A1C: CPT

## 2025-01-29 PROCEDURE — 80053 COMPREHEN METABOLIC PANEL: CPT

## 2025-01-29 PROCEDURE — 36415 COLL VENOUS BLD VENIPUNCTURE: CPT

## 2025-01-29 PROCEDURE — 85025 COMPLETE CBC W/AUTO DIFF WBC: CPT

## 2025-01-30 DIAGNOSIS — E55.9 VITAMIN D DEFICIENCY: Primary | ICD-10-CM

## 2025-01-30 RX ORDER — ERGOCALCIFEROL 1.25 MG/1
50000 CAPSULE, LIQUID FILLED ORAL WEEKLY
Qty: 12 CAPSULE | Refills: 0 | Status: SHIPPED | OUTPATIENT
Start: 2025-01-30

## 2025-01-30 ASSESSMENT — ENCOUNTER SYMPTOMS
WHEEZING: 0
ABDOMINAL PAIN: 0
ALLERGIC/IMMUNOLOGIC NEGATIVE: 1
DIARRHEA: 0
VOMITING: 0
COUGH: 0
SHORTNESS OF BREATH: 0
CONSTIPATION: 0
BACK PAIN: 0
NAUSEA: 0

## 2025-02-04 ENCOUNTER — HOSPITAL ENCOUNTER (OUTPATIENT)
Age: 35
Discharge: HOME | End: 2025-02-06
Payer: COMMERCIAL

## 2025-02-04 DIAGNOSIS — R42 DIZZINESS: ICD-10-CM

## 2025-02-04 DIAGNOSIS — R07.9 CHEST PAIN, UNSPECIFIED TYPE: ICD-10-CM

## 2025-02-04 DIAGNOSIS — E66.812 CLASS 2 SEVERE OBESITY DUE TO EXCESS CALORIES WITH SERIOUS COMORBIDITY AND BODY MASS INDEX (BMI) OF 35.0 TO 35.9 IN ADULT: ICD-10-CM

## 2025-02-04 DIAGNOSIS — E66.01 CLASS 2 SEVERE OBESITY DUE TO EXCESS CALORIES WITH SERIOUS COMORBIDITY AND BODY MASS INDEX (BMI) OF 35.0 TO 35.9 IN ADULT: ICD-10-CM

## 2025-02-04 PROCEDURE — 93306 TTE W/DOPPLER COMPLETE: CPT

## 2025-02-05 LAB
ECHO AO ASC DIAM: 3 CM
ECHO AO ROOT DIAM: 2.9 CM
ECHO AV AREA PEAK VELOCITY: 2.8 CM2
ECHO AV AREA VTI: 2.7 CM2
ECHO AV MEAN GRADIENT: 4 MMHG
ECHO AV MEAN VELOCITY: 1 M/S
ECHO AV PEAK GRADIENT: 6 MMHG
ECHO AV PEAK VELOCITY: 1.3 M/S
ECHO AV VELOCITY RATIO: 0.85
ECHO AV VTI: 28.8 CM
ECHO EST RA PRESSURE: 3 MMHG
ECHO LA AREA 2C: 18.2 CM2
ECHO LA AREA 4C: 19.3 CM2
ECHO LA DIAMETER: 4.6 CM
ECHO LA MAJOR AXIS: 5.9 CM
ECHO LA MINOR AXIS: 5.2 CM
ECHO LA TO AORTIC ROOT RATIO: 1.59
ECHO LA VOL BP: 53 ML (ref 18–58)
ECHO LA VOL MOD A2C: 51 ML (ref 18–58)
ECHO LA VOL MOD A4C: 49 ML (ref 18–58)
ECHO LV E' LATERAL VELOCITY: 11.9 CM/S
ECHO LV E' SEPTAL VELOCITY: 10.1 CM/S
ECHO LV EDV A2C: 120 ML
ECHO LV EDV A4C: 127 ML
ECHO LV EJECTION FRACTION A2C: 56 %
ECHO LV EJECTION FRACTION A4C: 54 %
ECHO LV EJECTION FRACTION BIPLANE: 56 % (ref 55–100)
ECHO LV ESV A2C: 53 ML
ECHO LV ESV A4C: 57 ML
ECHO LV FRACTIONAL SHORTENING: 31 % (ref 28–44)
ECHO LV INTERNAL DIMENSION DIASTOLIC: 5.8 CM (ref 4.2–5.9)
ECHO LV INTERNAL DIMENSION SYSTOLIC: 4 CM
ECHO LV IVSD: 0.8 CM (ref 0.6–1)
ECHO LV MASS 2D: 175.4 G (ref 88–224)
ECHO LV POSTERIOR WALL DIASTOLIC: 0.8 CM (ref 0.6–1)
ECHO LV RELATIVE WALL THICKNESS RATIO: 0.28
ECHO LVOT AREA: 3.1 CM2
ECHO LVOT AV VTI INDEX: 0.84
ECHO LVOT DIAM: 2 CM
ECHO LVOT MEAN GRADIENT: 3 MMHG
ECHO LVOT PEAK GRADIENT: 5 MMHG
ECHO LVOT PEAK VELOCITY: 1.1 M/S
ECHO LVOT SV: 76.3 ML
ECHO LVOT VTI: 24.3 CM
ECHO MV A VELOCITY: 0.31 M/S
ECHO MV E DECELERATION TIME (DT): 180 MS
ECHO MV E VELOCITY: 0.75 M/S
ECHO MV E/A RATIO: 2.42
ECHO MV E/E' LATERAL: 6.3
ECHO MV E/E' RATIO (AVERAGED): 6.86
ECHO MV E/E' SEPTAL: 7.43
ECHO PULMONARY ARTERY SYSTOLIC PRESSURE (PASP): 31 MMHG
ECHO PV MAX VELOCITY: 1.1 M/S
ECHO PV PEAK GRADIENT: 5 MMHG
ECHO RA AREA 4C: 17.1 CM2
ECHO RA VOLUME: 47 ML
ECHO RIGHT VENTRICULAR SYSTOLIC PRESSURE (RVSP): 26 MMHG
ECHO RV FREE WALL PEAK S': 12.8 CM/S
ECHO RV INTERNAL DIMENSION: 4.1 CM
ECHO RV TAPSE: 2 CM (ref 1.7–?)
ECHO TV REGURGITANT MAX VELOCITY: 2.4 M/S
ECHO TV REGURGITANT PEAK GRADIENT: 28 MMHG

## 2025-02-25 ENCOUNTER — OFFICE VISIT (OUTPATIENT)
Dept: ORTHOPEDIC SURGERY | Age: 35
End: 2025-02-25
Payer: COMMERCIAL

## 2025-02-25 VITALS — RESPIRATION RATE: 14 BRPM | HEIGHT: 71 IN | WEIGHT: 252 LBS | BODY MASS INDEX: 35.28 KG/M2

## 2025-02-25 DIAGNOSIS — M54.6 CHRONIC MIDLINE THORACIC BACK PAIN: ICD-10-CM

## 2025-02-25 DIAGNOSIS — G89.29 CHRONIC MIDLINE LOW BACK PAIN WITHOUT SCIATICA: Primary | ICD-10-CM

## 2025-02-25 DIAGNOSIS — M54.50 CHRONIC MIDLINE LOW BACK PAIN WITHOUT SCIATICA: Primary | ICD-10-CM

## 2025-02-25 DIAGNOSIS — G89.29 CHRONIC MIDLINE THORACIC BACK PAIN: ICD-10-CM

## 2025-02-25 PROCEDURE — 99203 OFFICE O/P NEW LOW 30 MIN: CPT | Performed by: ORTHOPAEDIC SURGERY

## 2025-02-25 NOTE — PROGRESS NOTES
Patient ID: Jeremi Pierce is a 34 y.o. male    Chief Compliant:  No chief complaint on file.       Diagnostic imaging:    AP lateral thoracic spine January 2025 normal    AP lateral lumbar spine August 12, 2024 normal    CT abdomen pelvis reviewed for spine from February 2024 normal    MRI lumbar spine from 2022 some L4-5 and 5 1 disc dehydration slight collapse age-appropriate    Assessment and Plan:  1. Chronic midline low back pain without sciatica    2. Chronic midline thoracic back pain        Chronic low back pain with occasional radiation into the leg. -Patient specifically denies numbness tingling or weakness into the legs    Occasional muscle spasms in the back, occurring around twice a year.     Pain in periscapular region radiating up into the neck really the upper thoracic and cervical thoracic region    PT    Referred to pain management for lumbar epidural steroid injections      Follow up 8 weeks    HPI:  This is a 34 y.o. male who presents to the clinic today as a new patient for follow up of low back pain.     Chronic ;ow back pain with occasional radiation into the leg.    Occasional muscle spasms in the back, occurring around twice a year. These may last from a couple hours two a couple of days..    Pain in periscapular region radiating up into the neck    Hx of PT around 2 years ago, with minimal relief    Review of Systems   All other systems reviewed and are negative.      Past History:    Current Outpatient Medications:     vitamin D (ERGOCALCIFEROL) 1.25 MG (49047 UT) CAPS capsule, Take 1 capsule by mouth once a week, Disp: 12 capsule, Rfl: 0    magnesium 30 MG tablet, Take 1 tablet by mouth 2 times daily, Disp: , Rfl:     traZODone (DESYREL) 150 MG tablet, Take 1 tablet by mouth nightly, Disp: 30 tablet, Rfl: 11    naproxen (NAPROSYN) 500 MG tablet, Take 1 tablet by mouth 2 times daily as needed for Pain, Disp: 28 tablet, Rfl: 0    cyclobenzaprine (FLEXERIL) 10 MG tablet, , Disp: , Rfl:

## 2025-03-10 ENCOUNTER — HOSPITAL ENCOUNTER (OUTPATIENT)
Dept: PHYSICAL THERAPY | Age: 35
Setting detail: THERAPIES SERIES
Discharge: HOME | End: 2025-03-10
Attending: ORTHOPAEDIC SURGERY
Payer: COMMERCIAL

## 2025-03-10 PROCEDURE — 97112 NEUROMUSCULAR REEDUCATION: CPT

## 2025-03-10 PROCEDURE — 97162 PT EVAL MOD COMPLEX 30 MIN: CPT
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
## 2025-03-11 ENCOUNTER — OFFICE VISIT (OUTPATIENT)
Dept: INTERNAL MEDICINE CLINIC | Age: 35
End: 2025-03-11
Payer: COMMERCIAL

## 2025-03-11 VITALS
OXYGEN SATURATION: 98 % | HEART RATE: 89 BPM | DIASTOLIC BLOOD PRESSURE: 72 MMHG | HEIGHT: 71 IN | SYSTOLIC BLOOD PRESSURE: 118 MMHG | BODY MASS INDEX: 35.56 KG/M2 | WEIGHT: 254 LBS

## 2025-03-11 DIAGNOSIS — E55.9 VITAMIN D DEFICIENCY: ICD-10-CM

## 2025-03-11 DIAGNOSIS — G47.33 OSA (OBSTRUCTIVE SLEEP APNEA): ICD-10-CM

## 2025-03-11 DIAGNOSIS — R74.01 TRANSAMINITIS: ICD-10-CM

## 2025-03-11 DIAGNOSIS — M48.07 NEURAL FORAMINAL STENOSIS OF LUMBOSACRAL SPINE: Primary | ICD-10-CM

## 2025-03-11 DIAGNOSIS — E66.09 CLASS 2 OBESITY DUE TO EXCESS CALORIES WITHOUT SERIOUS COMORBIDITY WITH BODY MASS INDEX (BMI) OF 35.0 TO 35.9 IN ADULT: ICD-10-CM

## 2025-03-11 DIAGNOSIS — E66.812 CLASS 2 OBESITY DUE TO EXCESS CALORIES WITHOUT SERIOUS COMORBIDITY WITH BODY MASS INDEX (BMI) OF 35.0 TO 35.9 IN ADULT: ICD-10-CM

## 2025-03-11 DIAGNOSIS — E78.2 MIXED HYPERLIPIDEMIA: ICD-10-CM

## 2025-03-11 PROCEDURE — 99214 OFFICE O/P EST MOD 30 MIN: CPT

## 2025-03-11 ASSESSMENT — ENCOUNTER SYMPTOMS
CONSTIPATION: 0
COUGH: 0
ABDOMINAL PAIN: 0
VOMITING: 0
BACK PAIN: 1
ALLERGIC/IMMUNOLOGIC NEGATIVE: 1
NAUSEA: 0
DIARRHEA: 0
SHORTNESS OF BREATH: 0
WHEEZING: 0

## 2025-03-11 NOTE — PROGRESS NOTES
[facility]  [address] [address]  Dept: [phone]  Dept Fax: [phone]    OFFICE VISIT NOTE  Date of patient's visit: 3/11/2025  Patient's Name:  Jeremi Pierce YOB: 1990            Patient Care Team:  Ortiz Anderson MD as PCP - General (Internal Medicine)  Ortiz Anderson MD as PCP - Empaneled Provider  _________________________________________    ________________________________________________  Chief Complaint:   Back Pain (Seeing ortho and pain management ) and Discuss Labs    _______________________________________________  History of Presenting Illness:  History was obtained from the patient. Jeremi Pierce is a 34 y.o. male.   Follow-up     Lab Results   Component Value Date    HGB 16.0 01/29/2025    LABA1C 5.3 01/29/2025    CHOL 254 (H) 01/29/2025    HDL 36 (L) 01/29/2025     (H) 01/29/2025    TRIG 241 (H) 01/29/2025    VITD25 17.3 (L) 01/29/2025    CREATININE 1.3 (H) 01/29/2025     _____________________________________________________  Past Medical/Surgical History:        Diagnosis Date    Abdominal pain     Anxiety     Bloating     Constipation     Excessive daytime sleepiness     GERD (gastroesophageal reflux disease)     Headache     Migraines     Panic attacks     Poor venous access     Rectal bleeding     Sleep apnea     MILD, HAS CPAP BUT DOESN'T USE.    Sleep apnea     wears CPAP           Procedure Laterality Date    COLONOSCOPY N/A 6/30/2023    COLONOSCOPY WITH BIOPSY performed by Phan Mccauley MD at [facility] ENDO    UPPER GASTROINTESTINAL ENDOSCOPY N/A 6/30/2023    EGD BIOPSY performed by Phan Mccauley MD at [facility] ENDO     _____________________________________________________  Health Maintenance Due   Topic Date Due    Hepatitis B vaccine (3 of 3 - 3-dose series) 09/10/2002    Varicella vaccine (1 of 2 - 13+ 2-dose series) Never done    DTaP/Tdap/Td vaccine (2 - Td or Tdap) 06/25/2024

## 2025-03-12 ENCOUNTER — HOSPITAL ENCOUNTER (OUTPATIENT)
Dept: PHYSICAL THERAPY | Age: 35
Setting detail: THERAPIES SERIES
Discharge: HOME | End: 2025-03-12
Attending: ORTHOPAEDIC SURGERY
Payer: COMMERCIAL

## 2025-03-12 PROCEDURE — 97112 NEUROMUSCULAR REEDUCATION: CPT

## 2025-03-12 PROCEDURE — 97110 THERAPEUTIC EXERCISES: CPT

## 2025-03-12 NOTE — FLOWSHEET NOTE
[facility]   Outpatient Rehabilitation & Therapy  [address] [facility] [facility] [address]  P: [phone]   F: [phone]    Physical Therapy Daily Treatment Note      Date:  3/12/2025  Patient Name:  Jeremi Pierce    :  1990  MRN: [id number]  Physician: Nael Wilde MD                                Insurance: BCBS $0 copay visits based on medical necessity. Deductible met.   BENEFITS   Medical Diagnosis:   M54.50, G89.29 (ICD-10-CM) - Chronic midline low back pain without sciatica   M54.6, G89.29 (ICD-10-CM) - Chronic midline thoracic back pain     Rehab Codes:   M25.60 Stiffness NEC ,   M62.81 weakness,  R26.2 Difficulty walking, NEC     Onset Date: 25               Next 's appt.: 25  Visit Count: 2                    Cancel/No Show: 0/0    Subjective:    Pt reports 10/10 pain in lower back and 5/10 pain in thoracic region at arrival. No complaints following initial eval. Pt reports understanding to postural training on HEP.     Pain:  [x] Yes  [] No   BRIDGETT Lumbar: 10/10   Thoracic: 5/10  Pain altered Tx:  [x] No  [] Yes  Action:  Comments:    Objective:  Modalities:   PRECAUTIONS: n/a  Exercises:  Exercise       Reps/ Time Weight/ Level Completed  today Comments   Manual                       Table           LTR 10x 5\" hold   x      Supine core:          +U  BKFO 10x ea    x     + alt marches 10x 2    x     + alt UE flx add 3#                  Bridge + tband abd 10x green  x     Prone active rectus femoris stretch      Control core/avoid ant. Pelvic tilt  -Held 3/12 due to no stretch felt    Side-lying hip flexor stretch  30\" x 3 ea Strap  x    Calf stretch  30\"x 2 ea Strap x                Seated           Spine neutral educ   x     + core/postural ex's          Ball press outs/ ups  10x 2  4# x    Active 90/90 h/s stretch add     3/12: pt felt stretch solely in calfs   BRIDGETT ER with scap retraction  10x 2 Yellow x    BRIDGETT horizontal abd with scap retraction   10x 2 Yellow  x

## 2025-03-17 ENCOUNTER — HOSPITAL ENCOUNTER (OUTPATIENT)
Dept: PHYSICAL THERAPY | Age: 35
Setting detail: THERAPIES SERIES
Discharge: HOME | End: 2025-03-17
Attending: ORTHOPAEDIC SURGERY
Payer: COMMERCIAL

## 2025-03-17 PROCEDURE — 97110 THERAPEUTIC EXERCISES: CPT

## 2025-03-17 PROCEDURE — 97530 THERAPEUTIC ACTIVITIES: CPT

## 2025-03-17 NOTE — FLOWSHEET NOTE
[facility]   Outpatient Rehabilitation & Therapy  [address]  P: [phone]   F: [phone]    Physical Therapy Daily Treatment Note      Date:  3/17/2025  Patient Name:  Jeremi Pierce    :  1990  MRN: [id number]  Physician: Nael Wilde MD                                Insurance: BCBS $0 copay visits based on medical necessity. Deductible met.   BENEFITS   Medical Diagnosis:   M54.50, G89.29 (ICD-10-CM) - Chronic midline low back pain without sciatica   M54.6, G89.29 (ICD-10-CM) - Chronic midline thoracic back pain     Rehab Codes:   M25.60 Stiffness NEC ,   M62.81 weakness,  R26.2 Difficulty walking, NEC     Onset Date: 25               Next 's appt.: 25  Visit Count: 3/14                    Cancel/No Show: 0/0    Subjective:    Pt declined any significant pain or soreness from previous visit.     Pain:  [x] Yes  [] No   BRIDGETT Lumbar: 5/10   Thoracic: 2/10  Pain altered Tx:  [x] No  [] Yes  Action:  Comments:    Objective:  Modalities:   PRECAUTIONS: n/a  Exercises:  Exercise       Reps/ Time Weight/ Level Completed  today Comments   Manual                       Table           LTR 10x 5\" hold   x      Supine core:          +U  BKFO 10x ea  green x     + alt marches 10x 2  green x     + alt UE flx 10x 2 3# x               Bridge + tband abd 10x 2 green x     Prone active rectus femoris stretch     Control core/avoid ant. Pelvic tilt  -Held 3/12 due to no stretch felt    Side-lying hip flexor stretch  30\" x 3 ea Strap      Calf stretch  30\"x 2 ea Strap                Seated          Spine neutral educ        + core/postural ex's         Ball press outs/ ups, diagonal  10x 2  4# x Added diagonal 3/17   Active 90/90 h/s stretch add    3/12: pt felt stretch solely in calfs   Hamstring/ calf stretch  30\" x 3 ea  x    BRIDGETT ER with scap retraction  10x 2 Yellow x    BRIDGETT horizontal abd with scap retraction   10x 2 Yellow  x               Sit to stand educ educ

## 2025-03-18 ENCOUNTER — OFFICE VISIT (OUTPATIENT)
Dept: FAMILY MEDICINE CLINIC | Age: 35
End: 2025-03-18
Payer: COMMERCIAL

## 2025-03-18 VITALS
TEMPERATURE: 98.1 F | OXYGEN SATURATION: 98 % | DIASTOLIC BLOOD PRESSURE: 68 MMHG | HEART RATE: 82 BPM | SYSTOLIC BLOOD PRESSURE: 106 MMHG

## 2025-03-18 DIAGNOSIS — J06.9 VIRAL URI: Primary | ICD-10-CM

## 2025-03-18 PROCEDURE — 99213 OFFICE O/P EST LOW 20 MIN: CPT | Performed by: NURSE PRACTITIONER

## 2025-03-18 RX ORDER — BENZONATATE 100 MG/1
100 CAPSULE ORAL 3 TIMES DAILY PRN
Qty: 21 CAPSULE | Refills: 0 | Status: SHIPPED | OUTPATIENT
Start: 2025-03-18 | End: 2025-03-25

## 2025-03-18 RX ORDER — FLUTICASONE PROPIONATE 50 MCG
2 SPRAY, SUSPENSION (ML) NASAL DAILY
Qty: 16 G | Refills: 0 | Status: SHIPPED | OUTPATIENT
Start: 2025-03-18

## 2025-03-18 ASSESSMENT — ENCOUNTER SYMPTOMS
WHEEZING: 0
SHORTNESS OF BREATH: 0
CHEST TIGHTNESS: 0
SWOLLEN GLANDS: 0
SINUS COMPLAINT: 1
SINUS PRESSURE: 0
CHOKING: 0
STRIDOR: 0
SORE THROAT: 1
COUGH: 1
HOARSE VOICE: 0

## 2025-03-18 NOTE — PROGRESS NOTES
[facility] WALK-IN FAMILY MEDICINE  [address]  [address]  [facility] [address]  Dept: [phone]  Dept Fax: [phone]    Jeremi Pierce is a 34 y.o. male who presents to the urgent care today for his medical conditions/complaints as notedbelow.  Jeremi Pierce is c/o of Sinus Problem (Onset today  with headache, sore throat, runny nose,watery eyes, and coughing.)      HPI:     34-year-old male presents for sinus symptoms that started today with headache sore throat runny nose watery eyes and just started coughing  No fevers no chills  No known ill exposure  Declines COVID or flu testing    Sinus Problem  This is a new problem. The current episode started today. There has been no fever. Associated symptoms include congestion, coughing, headaches and a sore throat. Pertinent negatives include no chills, diaphoresis, ear pain, hoarse voice, neck pain, shortness of breath, sinus pressure, sneezing or swollen glands. (Watery eyes) Past treatments include nothing. The treatment provided no relief.       Past Medical History:   Diagnosis Date    Abdominal pain     Anxiety     Bloating     Constipation     Excessive daytime sleepiness     GERD (gastroesophageal reflux disease)     Headache     Migraines     Panic attacks     Poor venous access     Rectal bleeding     Sleep apnea     MILD, HAS CPAP BUT DOESN'T USE.    Sleep apnea     wears CPAP        Current Outpatient Medications   Medication Sig Dispense Refill    fluticasone (FLONASE) 50 MCG/ACT nasal spray 2 sprays by Each Nostril route daily 16 g 0    benzonatate (TESSALON PERLES) 100 MG capsule Take 1 capsule by mouth 3 times daily as needed for Cough 21 capsule 0    vitamin D (ERGOCALCIFEROL) 1.25 MG (66711 UT) CAPS capsule Take 1 capsule by mouth once a week 12 capsule 0    magnesium 30 MG tablet Take 1 tablet by mouth 2 times daily      traZODone (DESYREL) 150 MG tablet Take 1 tablet by

## 2025-03-19 ENCOUNTER — HOSPITAL ENCOUNTER (OUTPATIENT)
Dept: PHYSICAL THERAPY | Age: 35
Setting detail: THERAPIES SERIES
Discharge: HOME | End: 2025-03-19
Attending: ORTHOPAEDIC SURGERY
Payer: COMMERCIAL

## 2025-03-19 PROCEDURE — 97110 THERAPEUTIC EXERCISES: CPT

## 2025-03-19 NOTE — FLOWSHEET NOTE
Sit to stand educ educ    Increase reps next              Standing          Spine neutral educ        BRIDGETT shoulder ext 10x 3 Blue x  Increased reps  3/19   Rows  10x 3 Blue x Increased reps 3/19              Fwd step ups 15x ea 6\"  x Glut focus;  Increased reps 3/19   Hip flexor stretch on step  30\" x 2 ea  6\" x    Sit back squats 10x   x Glut/posture focus   Pec stretch 30\" x 3   x            Box Lifts  4x ea 25# x -Hip cleo <>floor transfer   -Hip cleo<>over head transfer    Other:     Specific instructions for next treatment:   -Increase postural ex's as noted above  -Continue to work on core and glut strengthening      General instructions for treatment:   -Ex’s to increase ROM w/ focus on all Lx/trunk planes except flx   -Stretching, w/ focus on hip flexors, h/s, pecs   -Postural educ./strengthening/stretching   -Mechanical/functional retraining as indicated (STS/transfers, carpentry duties)   -Gait training w/ focus on posture, increasing R hip ext. Increasing R stance time     Assessment: [x] Progressing toward goals.  Pt performed exercises as charted above.  He was able to increase reps of some of his exercises without increased pain.  He denied increased pain after PT today.  He did need verbal cues to complete exercises with proper posture and speed.    [] No change.     [] Other:     [x] Patient would continue to benefit from skilled physical therapy services in order to: decrease/centralize pain, increase ROM, increase strength, improve postural awareness and correction, improve gait mechanics,  and increase function.    STGs (to be met in 7 treatments) :   Decrease/centralize pain/symptoms, and decrease frequency by >/= 25% per pt report, in order to increase ability/tolerance w/ standing, sleeping   Improve postural awareness/correction w/ static activities, in order to improve sitting and standing tolerance by >/= 50%, per pt report   Independent with Home Exercise Programs in order to

## 2025-03-20 ENCOUNTER — HOSPITAL ENCOUNTER (OUTPATIENT)
Dept: PAIN MANAGEMENT | Age: 35
Discharge: HOME | End: 2025-03-20
Payer: COMMERCIAL

## 2025-03-20 VITALS — BODY MASS INDEX: 35.56 KG/M2 | WEIGHT: 254 LBS | HEIGHT: 71 IN

## 2025-03-20 DIAGNOSIS — M54.50 CHRONIC MIDLINE LOW BACK PAIN WITHOUT SCIATICA: Primary | ICD-10-CM

## 2025-03-20 DIAGNOSIS — G89.29 CHRONIC MIDLINE LOW BACK PAIN WITHOUT SCIATICA: Primary | ICD-10-CM

## 2025-03-20 PROCEDURE — 99203 OFFICE O/P NEW LOW 30 MIN: CPT

## 2025-03-20 RX ORDER — DANTROLENE SODIUM 25 MG/1
25 CAPSULE ORAL 2 TIMES DAILY
Qty: 60 CAPSULE | Refills: 0 | Status: SHIPPED | OUTPATIENT
Start: 2025-03-20 | End: 2025-04-19

## 2025-03-20 ASSESSMENT — ENCOUNTER SYMPTOMS
RESPIRATORY NEGATIVE: 1
BACK PAIN: 1
EYES NEGATIVE: 1
GASTROINTESTINAL NEGATIVE: 1
ALLERGIC/IMMUNOLOGIC NEGATIVE: 1

## 2025-03-20 ASSESSMENT — PAIN SCALES - GENERAL: PAINLEVEL_OUTOF10: 8

## 2025-03-20 ASSESSMENT — PAIN DESCRIPTION - FREQUENCY: FREQUENCY: CONTINUOUS

## 2025-03-20 ASSESSMENT — PAIN DESCRIPTION - DESCRIPTORS: DESCRIPTORS: ACHING

## 2025-03-20 ASSESSMENT — PAIN DESCRIPTION - ORIENTATION: ORIENTATION: LOWER;UPPER

## 2025-03-20 ASSESSMENT — PAIN DESCRIPTION - LOCATION: LOCATION: BACK

## 2025-03-20 ASSESSMENT — PAIN DESCRIPTION - PAIN TYPE: TYPE: CHRONIC PAIN

## 2025-03-20 NOTE — PROGRESS NOTES
Previous surgeries for pain  What part of the body did they have the surgery: N/a   What physician did the surgery: n/a   What Facility did they have the surgery done: n/a   Date of Surgery: n/a      Social History:  Marital status:    Employment History: [employer]   Working: Yes      Full time Or Part time: Full Time   Disability: N/A   Legal Issues related to pain complaint:  N/a      Pain Disability Index score : n/a           Past Medical History:   Diagnosis Date    Abdominal pain     Anxiety     Bloating     Constipation     Excessive daytime sleepiness     GERD (gastroesophageal reflux disease)     Headache     Migraines     Panic attacks     Poor venous access     Rectal bleeding     Sleep apnea     MILD, HAS CPAP BUT DOESN'T USE.    Sleep apnea     wears CPAP        Past Surgical History:   Procedure Laterality Date    COLONOSCOPY N/A 2023    COLONOSCOPY WITH BIOPSY performed by Phan Mccauley MD at [facility] ENDO    UPPER GASTROINTESTINAL ENDOSCOPY N/A 2023    EGD BIOPSY performed by Phan Mccauley MD at [facility] ENDO       Social History     Socioeconomic History    Marital status:      Spouse name: None    Number of children: None    Years of education: None    Highest education level: None   Tobacco Use    Smoking status: Former     Current packs/day: 0.00     Average packs/day: 1 pack/day for 10.0 years (10.0 ttl pk-yrs)     Types: Cigarettes     Start date: 10/23/2006     Quit date: 10/23/2016     Years since quittin.4    Smokeless tobacco: Never    Tobacco comments:     quit oct 2016   Vaping Use    Vaping status: Never Used   Substance and Sexual Activity    Alcohol use: Yes     Alcohol/week: 0.0 standard drinks of alcohol     Comment: social    Drug use: No    Sexual activity: Yes     Partners: Female     Social Drivers of Health     Food Insecurity: Patient Declined (2025)    Hunger Vital Sign     Worried About Running Out of Food in the Last Year: Patient

## 2025-03-24 ENCOUNTER — HOSPITAL ENCOUNTER (OUTPATIENT)
Dept: PHYSICAL THERAPY | Age: 35
Setting detail: THERAPIES SERIES
Discharge: HOME | End: 2025-03-24
Attending: ORTHOPAEDIC SURGERY
Payer: COMMERCIAL

## 2025-03-24 PROCEDURE — 97110 THERAPEUTIC EXERCISES: CPT

## 2025-03-24 NOTE — FLOWSHEET NOTE
[facility]   Outpatient Rehabilitation & Therapy  [address] [facility] [address]  P: [phone]   F: [phone]    Physical Therapy Daily Treatment Note      Date:  3/24/2025  Patient Name:  Jeremi Pierce    :  1990  MRN: [id number]  Physician: Nael Wilde MD                                Insurance: BCBS $0 copay visits based on medical necessity. Deductible met.   BENEFITS   Medical Diagnosis:   M54.50, G89.29 (ICD-10-CM) - Chronic midline low back pain without sciatica   M54.6, G89.29 (ICD-10-CM) - Chronic midline thoracic back pain     Rehab Codes:   M25.60 Stiffness NEC ,   M62.81 weakness,  R26.2 Difficulty walking, NEC     Onset Date: 25               Next 's appt.: 25  Visit Count:                     Cancel/No Show: 0/0    Subjective:   Pt reports 8/10 pain in LB at start of session. Reports no difficulty with HEP.       Pain:  [x] Yes  [] No   BRIDGETT Lumbar: 8/10   Thoracic: 8/10  Pain altered Tx:  [x] No  [] Yes  Action:  Comments:    Objective:  Modalities:   PRECAUTIONS: n/a  Exercises:  Exercise       Reps/ Time Weight/ Level Completed  today Comments   Manual                       Table           LTR 10x 5\" hold   x      Supine core:          +U  BKFO 10x 2  green x  Increased reps 3/18/25   + alt marches 10x 2  green x     + alt UE flx 10x 2 3# x               Bridge + tband abd 10x 2 green x     Prone active rectus femoris stretch     Control core/avoid ant. Pelvic tilt  -Held 3/12 due to no stretch felt    Side-lying hip flexor stretch  30\" x 3 ea Strap  x    Calf stretch  30\"x 2 ea Strap x               Seated          Spine neutral educ        + core/postural ex's         Ball press outs/ ups, diagonal  10x 2  4# x Added diagonal 3/17   Active 90/90 h/s stretch add    3/12: pt felt stretch solely in calfs   Hamstring/ calf stretch  30\" x 3 ea  x    BRIDGETT ER with scap retraction  10x 2 Yellow x    BRIDGETT horizontal abd with scap retraction   10x 2 Yellow  x

## 2025-03-27 ENCOUNTER — HOSPITAL ENCOUNTER (OUTPATIENT)
Dept: PHYSICAL THERAPY | Age: 35
Setting detail: THERAPIES SERIES
Discharge: HOME | End: 2025-03-27
Attending: ORTHOPAEDIC SURGERY
Payer: COMMERCIAL

## 2025-03-27 PROCEDURE — 97110 THERAPEUTIC EXERCISES: CPT

## 2025-03-27 NOTE — FLOWSHEET NOTE
[facility]   Outpatient Rehabilitation & Therapy  [address] [facility] [address]  P: [phone]   F: [phone]    Physical Therapy Daily Treatment Note      Date:  3/27/2025  Patient Name:  Jeremi Pierce    :  1990  MRN: [id number]  Physician: Nael Wlide MD                                Insurance: BCBS $0 copay visits based on medical necessity. Deductible met.   BENEFITS   Medical Diagnosis:   M54.50, G89.29 (ICD-10-CM) - Chronic midline low back pain without sciatica   M54.6, G89.29 (ICD-10-CM) - Chronic midline thoracic back pain     Rehab Codes:   M25.60 Stiffness NEC ,   M62.81 weakness,  R26.2 Difficulty walking, NEC     Onset Date: 25               Next 's appt.: 25  Visit Count:                     Cancel/No Show: 0/0    Subjective:   Pt reports 6-7/10 pain in LB at start of session.       Pain:  [x] Yes  [] No   BRIDGETT Lumbar: 6-7/10   Thoracic: 6-7/10  Pain altered Tx:  [x] No  [] Yes  Action:  Comments:    Objective:  Modalities:   PRECAUTIONS: n/a  Exercises:  Exercise       Reps/ Time Weight/ Level Completed  today Comments   Manual                       Table           LTR 10x 5\" hold   x      Supine core:          +U  BKFO 10x 2  green x  Increased reps 3/18/25   + alt marches 10x 2  green x     + alt UE flx 10x 2 3# x               Bridge + tband abd 10x 2 green x     Prone active rectus femoris stretch     Control core/avoid ant. Pelvic tilt  -Held 3/12 due to no stretch felt    Side-lying hip flexor stretch  30\" x 3 ea Strap  x    Calf stretch  30\"x 2 ea Strap x               Seated          Spine neutral educ        + core/postural ex's         Ball press outs/ ups, diagonal  10x 2  4# x Added diagonal 3/17   Active 90/90 h/s stretch add    3/12: pt felt stretch solely in calfs   Hamstring/ calf stretch  30\" x 3 ea  x    BRIDGETT ER with scap retraction  10x 2 Yellow x    BRIDGETT horizontal abd with scap retraction   10x 2 Yellow  x               Sit to stand

## 2025-03-28 ENCOUNTER — HOSPITAL ENCOUNTER (OUTPATIENT)
Dept: SLEEP CENTER | Age: 35
Discharge: HOME | End: 2025-03-30
Payer: COMMERCIAL

## 2025-03-28 DIAGNOSIS — G47.33 OSA (OBSTRUCTIVE SLEEP APNEA): ICD-10-CM

## 2025-03-28 PROCEDURE — 95810 POLYSOM 6/> YRS 4/> PARAM: CPT

## 2025-03-29 VITALS
OXYGEN SATURATION: 94 % | RESPIRATION RATE: 14 BRPM | HEIGHT: 71 IN | BODY MASS INDEX: 35.56 KG/M2 | WEIGHT: 254 LBS | HEART RATE: 59 BPM

## 2025-03-29 ASSESSMENT — SLEEP AND FATIGUE QUESTIONNAIRES
HOW LIKELY ARE YOU TO NOD OFF OR FALL ASLEEP WHILE LYING DOWN TO REST IN THE AFTERNOON WHEN CIRCUMSTANCES PERMIT: HIGH CHANCE OF DOZING
HOW LIKELY ARE YOU TO NOD OFF OR FALL ASLEEP WHILE WATCHING TV: HIGH CHANCE OF DOZING
HOW LIKELY ARE YOU TO NOD OFF OR FALL ASLEEP IN A CAR, WHILE STOPPED FOR A FEW MINUTES IN TRAFFIC: SLIGHT CHANCE OF DOZING
HOW LIKELY ARE YOU TO NOD OFF OR FALL ASLEEP WHILE SITTING AND TALKING TO SOMEONE: MODERATE CHANCE OF DOZING
HOW LIKELY ARE YOU TO NOD OFF OR FALL ASLEEP WHILE SITTING QUIETLY AFTER LUNCH WITHOUT ALCOHOL: MODERATE CHANCE OF DOZING
ESS TOTAL SCORE: 15
HOW LIKELY ARE YOU TO NOD OFF OR FALL ASLEEP WHILE SITTING AND READING: WOULD NEVER DOZE
HOW LIKELY ARE YOU TO NOD OFF OR FALL ASLEEP WHEN YOU ARE A PASSENGER IN A CAR FOR AN HOUR WITHOUT A BREAK: MODERATE CHANCE OF DOZING
HOW LIKELY ARE YOU TO NOD OFF OR FALL ASLEEP WHILE SITTING INACTIVE IN A PUBLIC PLACE: MODERATE CHANCE OF DOZING

## 2025-03-31 RX ORDER — FLUOXETINE HYDROCHLORIDE 40 MG/1
40 CAPSULE ORAL DAILY
Qty: 30 CAPSULE | Refills: 0 | Status: SHIPPED | OUTPATIENT
Start: 2025-03-31

## 2025-04-02 ENCOUNTER — HOSPITAL ENCOUNTER (OUTPATIENT)
Dept: PHYSICAL THERAPY | Age: 35
Setting detail: THERAPIES SERIES
Discharge: HOME | End: 2025-04-02
Attending: ORTHOPAEDIC SURGERY
Payer: COMMERCIAL

## 2025-04-02 PROCEDURE — 97110 THERAPEUTIC EXERCISES: CPT

## 2025-04-02 PROCEDURE — 97116 GAIT TRAINING THERAPY: CPT

## 2025-04-02 NOTE — FLOWSHEET NOTE
hip and decreased R hip extension. Educated pt on proper gait mechanics and encourage to continue to focus on gait pattern at home. Cues provided throughout ambulation for correct body posture and to correct gait pattern with fair carry over. Pt reports feeling decreased pain and discomfort while ambulating with correct posture and technique.     [] No change.     [] Other:     [x] Patient would continue to benefit from skilled physical therapy services in order to: decrease/centralize pain, increase ROM, increase strength, improve postural awareness and correction, improve gait mechanics,  and increase function.    STGs (to be met in 7 treatments) :   Decrease/centralize pain/symptoms, and decrease frequency by >/= 25% per pt report, in order to increase ability/tolerance w/ standing, sleeping   Improve postural awareness/correction w/ static activities, in order to improve sitting and standing tolerance by >/= 50%, per pt report   Independent with Home Exercise Programs in order to maintain gains made with therapy interventions   Increase ROM throughout all Lx/trunk planes, except ext, to >/= 75%, in order to increase mobility for work and homemaking activities/duties      LTGs  (to be met in 14 treatments) :   Decrease/centralize pain/symptoms to </= 0-3/10, and decrease frequency, in order to increase ability/tolerance w/ work duties and housekeeping   Increase strength to full bridge, and core WFLs, in order to increase ability/stability w/ transfers, household chores, and work/recreational activities.   Improve postural awareness/correction w/ dynamic activities, in order to improve ability/stability, and therefore decrease associated pain, w/ lifting, household chores, and work/recreational activities.   Improve gait mechanics to WNLs/symmetrical throughout, in order to decrease strain on spine w/ ambulation  Increase function AEB Modified Oswestry </= 8% disability/impairment   [place] w/ HEP in order

## 2025-04-03 ENCOUNTER — OFFICE VISIT (OUTPATIENT)
Dept: NEUROLOGY | Age: 35
End: 2025-04-03
Payer: COMMERCIAL

## 2025-04-03 VITALS
HEIGHT: 71 IN | SYSTOLIC BLOOD PRESSURE: 126 MMHG | BODY MASS INDEX: 34.41 KG/M2 | DIASTOLIC BLOOD PRESSURE: 78 MMHG | WEIGHT: 245.8 LBS | HEART RATE: 71 BPM

## 2025-04-03 DIAGNOSIS — G47.33 OSA (OBSTRUCTIVE SLEEP APNEA): Primary | ICD-10-CM

## 2025-04-03 DIAGNOSIS — F41.0 PANIC ATTACKS: ICD-10-CM

## 2025-04-03 DIAGNOSIS — G43.009 MIGRAINE WITHOUT AURA AND WITHOUT STATUS MIGRAINOSUS, NOT INTRACTABLE: ICD-10-CM

## 2025-04-03 PROCEDURE — 99214 OFFICE O/P EST MOD 30 MIN: CPT | Performed by: PSYCHIATRY & NEUROLOGY

## 2025-04-03 RX ORDER — NAPROXEN 500 MG/1
TABLET ORAL
Qty: 60 TABLET | Refills: 3 | Status: SHIPPED | OUTPATIENT
Start: 2025-04-03

## 2025-04-03 RX ORDER — TOPIRAMATE 100 MG/1
100 TABLET, FILM COATED ORAL 2 TIMES DAILY
Qty: 60 TABLET | Refills: 11 | Status: SHIPPED | OUTPATIENT
Start: 2025-04-03

## 2025-04-03 RX ORDER — FLUOXETINE HYDROCHLORIDE 40 MG/1
40 CAPSULE ORAL DAILY
Qty: 30 CAPSULE | Refills: 11 | Status: SHIPPED | OUTPATIENT
Start: 2025-04-03

## 2025-04-03 ASSESSMENT — ENCOUNTER SYMPTOMS
GASTROINTESTINAL NEGATIVE: 1
ALLERGIC/IMMUNOLOGIC NEGATIVE: 1
RESPIRATORY NEGATIVE: 1
EYES NEGATIVE: 1

## 2025-04-03 NOTE — PROGRESS NOTES
Active Problem anxiety and panic attacks on prozac.Common migraine headaches  . Sleep apnea on nasal CPAP . The condition is he is still having anxiety more in big crowds freakin out feeling like he is getting suffocated wanting to go away from everybody . His daughter is in [facility] traveling happening there or when shopping at grocery store . Prozac 20 mg po qd helped a lot initially with symptoms coming back over last 1 to 2 months . He is having headaches every day over frontal head of pressure throbbing of grade 7 to 10 lasting from hours to 2 to 3 days taking tylenol averaging 2 per day . He is on topamax 100 mg po bid . He has tried maxalt , imitrex and fioricet that have not helped . He has stopped using nasal CPAP unable to get supplies .He can not find nasal CPAP machine at this time last used over a year ago  .He will go to bed at 8:30 PM falling asleep witnin 30 minutes sleeping 4:15 to 4:30 AM do to work schedule as [employer]  . There will be 1 to 2 awakenings able to fall back asleep . He is on desyrel 100 mg po qhs that consolidates sleep . He felt better with nasal CPAP now being fatigued and sleepy .  He had new sleep study arranged by PMD March 28 . Significant medications prozac 20 mg po qd ,  topamax 100 mg po bid , desyrel 100 mg po qhs . Testing MRI lumbar spine mild degenerative thecal sac narrowing and neural foraminal stenosis at L4-5 and L5-S1 , February 2022 . Polysomnogram apnea hypopea index 5.6 episodes per hour with oxyhemoglobin desaturation to 87 % , February 2020 . Auto CPAP 6-10 cm H20      Past Medical History:   Diagnosis Date    Abdominal pain     Anxiety     Bloating     Constipation     Excessive daytime sleepiness     GERD (gastroesophageal reflux disease)     Headache     Migraines     Panic attacks     Poor venous access     Rectal bleeding     Sleep apnea     MILD, HAS CPAP BUT DOESN'T USE.    Sleep apnea     wears CPAP       Past Surgical History:   Procedure

## 2025-04-04 ENCOUNTER — HOSPITAL ENCOUNTER (OUTPATIENT)
Dept: MRI IMAGING | Age: 35
Discharge: HOME | End: 2025-04-04
Attending: ANESTHESIOLOGY
Payer: COMMERCIAL

## 2025-04-04 DIAGNOSIS — G89.29 CHRONIC MIDLINE LOW BACK PAIN WITHOUT SCIATICA: ICD-10-CM

## 2025-04-04 DIAGNOSIS — M54.50 CHRONIC MIDLINE LOW BACK PAIN WITHOUT SCIATICA: ICD-10-CM

## 2025-04-04 PROCEDURE — 72148 MRI LUMBAR SPINE W/O DYE: CPT

## 2025-04-07 ENCOUNTER — HOSPITAL ENCOUNTER (OUTPATIENT)
Dept: PHYSICAL THERAPY | Age: 35
Setting detail: THERAPIES SERIES
Discharge: HOME | End: 2025-04-07
Attending: ORTHOPAEDIC SURGERY
Payer: COMMERCIAL

## 2025-04-07 PROCEDURE — 97110 THERAPEUTIC EXERCISES: CPT

## 2025-04-07 NOTE — FLOWSHEET NOTE
treatments) :   Decrease/centralize pain/symptoms to </= 0-3/10, and decrease frequency, in order to increase ability/tolerance w/ work duties and housekeeping   Increase strength to full bridge, and core WFLs, in order to increase ability/stability w/ transfers, household chores, and work/recreational activities.   Improve postural awareness/correction w/ dynamic activities, in order to improve ability/stability, and therefore decrease associated pain, w/ lifting, household chores, and work/recreational activities.   Improve gait mechanics to WNLs/symmetrical throughout, in order to decrease strain on spine w/ ambulation  Increase function AEB Modified Oswestry </= 8% disability/impairment   [place] w/ HEP in order to maintain gains made with therapy interventions upon discharge.      Patient goals: get rid of pain    Pt. Education:  [x] Yes  [] No  [] Reviewed Prior HEP/Ed  Method of Education: [x] Verbal  [x] Demo  [] Written  Comprehension of Education:  [x] Verbalizes understanding.  [x] Demonstrates understanding.  [] Needs review.  [x] Demonstrates/verbalizes HEP/Ed previously given.    4/2/25: educated pt on proper gait mechanics and encouraged pt to focus on gait pattern and upright posture at home. Pt educated on importance of HEP compliance.      Plan: [x] Continue per plan of care.   [x] Other: Pt. to await results of MRI, and will be out of town. Will follow up w/  In 2 weeks, then call to return to PT.       Treatment Charges: Mins Units Time in/Out   []  Modalities      [x]  Ther Exercise 38 3 530-608 pm   []  Manual Therapy      []  Ther Activities      []  Aquatics      []  Neuromuscular      [] Vasocompression      [] Gait Training      [] Dry needling        [] 1 or 2 muscles        [] 3 or more muscles      []  Other      Total Billable time 38 3      Time In: 5:30 pm            Time Out: 6:08 pm    Electronically signed by:  Jasmin Sumner PTA

## 2025-04-09 ENCOUNTER — RESULTS FOLLOW-UP (OUTPATIENT)
Dept: INTERNAL MEDICINE CLINIC | Age: 35
End: 2025-04-09

## 2025-04-09 ENCOUNTER — APPOINTMENT (OUTPATIENT)
Dept: PHYSICAL THERAPY | Age: 35
End: 2025-04-09
Attending: ORTHOPAEDIC SURGERY
Payer: COMMERCIAL

## 2025-04-11 ENCOUNTER — OFFICE VISIT (OUTPATIENT)
Dept: GASTROENTEROLOGY | Age: 35
End: 2025-04-11
Payer: COMMERCIAL

## 2025-04-11 VITALS
HEART RATE: 55 BPM | SYSTOLIC BLOOD PRESSURE: 122 MMHG | WEIGHT: 241 LBS | TEMPERATURE: 97.7 F | BODY MASS INDEX: 33.61 KG/M2 | DIASTOLIC BLOOD PRESSURE: 88 MMHG

## 2025-04-11 DIAGNOSIS — K21.9 GASTROESOPHAGEAL REFLUX DISEASE, UNSPECIFIED WHETHER ESOPHAGITIS PRESENT: ICD-10-CM

## 2025-04-11 DIAGNOSIS — K58.0 IRRITABLE BOWEL SYNDROME WITH DIARRHEA: Primary | ICD-10-CM

## 2025-04-11 PROCEDURE — 99214 OFFICE O/P EST MOD 30 MIN: CPT | Performed by: INTERNAL MEDICINE

## 2025-04-11 RX ORDER — PANTOPRAZOLE SODIUM 40 MG/1
40 TABLET, DELAYED RELEASE ORAL DAILY
Qty: 34 TABLET | Refills: 2 | Status: SHIPPED | OUTPATIENT
Start: 2025-04-11

## 2025-04-11 ASSESSMENT — ENCOUNTER SYMPTOMS
CONSTIPATION: 0
DIARRHEA: 1
BLOOD IN STOOL: 0
NAUSEA: 1
WHEEZING: 0
CHOKING: 0
COLOR CHANGE: 0
TROUBLE SWALLOWING: 0
RECTAL PAIN: 0
ABDOMINAL PAIN: 1
SHORTNESS OF BREATH: 0
SORE THROAT: 0
ANAL BLEEDING: 0
COUGH: 0
VOMITING: 0
ABDOMINAL DISTENTION: 0

## 2025-04-11 NOTE — PROGRESS NOTES
GI CLINIC FOLLOW UP    INTERVAL HISTORY:   Ortiz Anderson MD  [address] [place], [place]    Chief Complaint   Patient presents with    Transaminitis     Patient is here today due to Transaminitis, previous Dr Mccauley pt last seen on 7/21/23 for fatty liver & GERD.           HISTORY OF PRESENT ILLNESS: Mr.Justin ELIO Pierce is a 34 y.o. male , referred for evaluation of diarrhea.    He is c/o- episodes of loose stools off and on for several years. Stools are loose and is associated with urgency.   He has h/o- migraine for which he takes NSAID's    Also has mild elevation of ALT and previously his liver US showed fatty liver.    No c/o- nausea, vomiting, fever, loss of appetite, weight loss, bloating, bleeding [place], nocturnal diarrhea      Past Medical,Family, and Social History reviewed and does contribute to the patient presentingcondition.    I did review all the labs results available for the labs which were ordered by the primary care physician, and the other consultants, we search on [facility] and all the available care everywhere [facility]    I did review all the imaging studies of the abdomen available on EMR, ordered by the primary care physician and the other consultant    I did review all the pathology from the biopsies done on the previous endoscopies    Patient's PMH/PSH,SH,PSYCH Hx, MEDs, ALLERGIES, and ROS were all reviewed and updated in the appropriate sections.    PAST MEDICAL HISTORY:  Past Medical History:   Diagnosis Date    Abdominal pain     Anxiety     Bloating     Constipation     Excessive daytime sleepiness     GERD (gastroesophageal reflux disease)     Headache     Migraines     Panic attacks     Poor venous access     Rectal bleeding     Sleep apnea     MILD, HAS CPAP BUT DOESN'T USE.    Sleep apnea     wears CPAP       Past Surgical History:   Procedure Laterality Date    COLONOSCOPY N/A 6/30/2023    COLONOSCOPY WITH BIOPSY performed by Phan Mccauley MD at [facility] ENDO

## 2025-04-18 ENCOUNTER — TELEPHONE (OUTPATIENT)
Dept: GASTROENTEROLOGY | Age: 35
End: 2025-04-18

## 2025-04-18 DIAGNOSIS — E55.9 VITAMIN D DEFICIENCY: ICD-10-CM

## 2025-04-18 RX ORDER — ERGOCALCIFEROL 1.25 MG/1
50000 CAPSULE, LIQUID FILLED ORAL WEEKLY
Qty: 12 CAPSULE | Refills: 0 | Status: SHIPPED | OUTPATIENT
Start: 2025-04-18

## 2025-04-18 NOTE — TELEPHONE ENCOUNTER
Writer called patient to reschedule 7/14/25 3 month F/U -  is on call - [facility]

## 2025-04-20 DIAGNOSIS — M54.50 CHRONIC MIDLINE LOW BACK PAIN WITHOUT SCIATICA: ICD-10-CM

## 2025-04-20 DIAGNOSIS — G89.29 CHRONIC MIDLINE LOW BACK PAIN WITHOUT SCIATICA: ICD-10-CM

## 2025-04-21 RX ORDER — DANTROLENE SODIUM 25 MG/1
25 CAPSULE ORAL 2 TIMES DAILY
Qty: 60 CAPSULE | Refills: 0 | OUTPATIENT
Start: 2025-04-21

## 2025-04-23 ENCOUNTER — TELEPHONE (OUTPATIENT)
Dept: NEUROLOGY | Age: 35
End: 2025-04-23

## 2025-04-23 NOTE — TELEPHONE ENCOUNTER
At the time of the patient's follow up with Dr. Jones the sleep study report done 3/28/25 was not available.  Dr. Jones asked if I could watch for results and to make sure an order was sent to the patient's DME for a new machine.  Writer reviewed chart today and found sleep study report from 3/28/25 which stated \"This sleep study revealed no significant obstructive sleep apnea\".  Results were discussed with Dr. Jones who asked that I contact the patient to give this information.  Call placed to the patient and a message was left on his VM asking for a return call.

## 2025-04-30 ENCOUNTER — TELEPHONE (OUTPATIENT)
Dept: PAIN MANAGEMENT | Age: 35
End: 2025-04-30

## 2025-04-30 NOTE — TELEPHONE ENCOUNTER
I LM on patient's VM yesterday and again today advising I need to reschedule his OV tomorrow d/t MD being out of the office.  I asked patient to call; office phone number given.

## 2025-05-08 ENCOUNTER — HOSPITAL ENCOUNTER (OUTPATIENT)
Dept: PAIN MANAGEMENT | Age: 35
Discharge: HOME | End: 2025-05-08
Payer: COMMERCIAL

## 2025-05-08 VITALS — BODY MASS INDEX: 33.74 KG/M2 | WEIGHT: 241 LBS | HEIGHT: 71 IN

## 2025-05-08 DIAGNOSIS — M47.817 LUMBOSACRAL SPONDYLOSIS WITHOUT MYELOPATHY: Primary | ICD-10-CM

## 2025-05-08 DIAGNOSIS — M51.362 DEGENERATION OF INTERVERTEBRAL DISC OF LUMBAR REGION WITH DISCOGENIC BACK PAIN AND LOWER EXTREMITY PAIN: ICD-10-CM

## 2025-05-08 PROCEDURE — 99214 OFFICE O/P EST MOD 30 MIN: CPT | Performed by: ANESTHESIOLOGY

## 2025-05-08 PROCEDURE — 99213 OFFICE O/P EST LOW 20 MIN: CPT

## 2025-05-08 ASSESSMENT — ENCOUNTER SYMPTOMS
RESPIRATORY NEGATIVE: 1
BACK PAIN: 1
EYES NEGATIVE: 1

## 2025-05-08 NOTE — H&P (VIEW-ONLY)
The patient is a 34 y.o.Non- / non  male.    Chief Complaint   Patient presents with    Back Pain     MRI f/u        Back Pain  Associated symptoms include headaches. Pertinent negatives include no fever, numbness or weakness.     34-year-old gentleman with history of chronic axial back pain going on for more than 1 year predominantly axial located in the midline and paramidline region  No dermatomal radiation of pain  Aggravated with lifting bending twisting turning  Recently completed physical therapy and MRI lumbar spine  Has tried NSAIDs and muscle relaxant  Pain is constant aggravates with routine activity interfering with quality of life  Here for review of imaging and discuss treatment option    Patient is here today for: back pain  Pain level: 6  Character: aching   Radiating: both thighs   Weakness or numbness: weakness   Aggravating Factors: standing, movement   Alleviating Factors: rest   Constant or intermitting: intermittent   Bladder/bowel loss: no      Past Medical History:   Diagnosis Date    Abdominal pain     Anxiety     Bloating     Constipation     Excessive daytime sleepiness     GERD (gastroesophageal reflux disease)     Headache     Migraines     Panic attacks     Poor venous access     Rectal bleeding     Sleep apnea     MILD, HAS CPAP BUT DOESN'T USE.    Sleep apnea     wears CPAP        Past Surgical History:   Procedure Laterality Date    COLONOSCOPY N/A 6/30/2023    COLONOSCOPY WITH BIOPSY performed by Phan Mccauley MD at [facility]    UPPER GASTROINTESTINAL ENDOSCOPY N/A 6/30/2023    EGD BIOPSY performed by Phan Mccauley MD at [facility]       Social History     Socioeconomic History    Marital status:      Spouse name: None    Number of children: None    Years of education: None    Highest education level: None   Tobacco Use    Smoking status: Former     Current packs/day: 0.00     Average packs/day: 1 pack/day for 10.0 years (10.0 ttl pk-yrs)

## 2025-05-08 NOTE — PROGRESS NOTES
Types: Cigarettes     Start date: 10/23/2006     Quit date: 10/23/2016     Years since quittin.5     Passive exposure: Current (Wife - vape)    Smokeless tobacco: Never    Tobacco comments:     quit oct 2016   Vaping Use    Vaping status: Never Used   Substance and Sexual Activity    Alcohol use: Yes     Alcohol/week: 0.0 standard drinks of alcohol     Comment: social    Drug use: No    Sexual activity: Yes     Partners: Female     Social Drivers of Health     Food Insecurity: Patient Declined (2025)    Hunger Vital Sign     Worried About Running Out of Food in the Last Year: Patient declined     Ran Out of Food in the Last Year: Patient declined   Transportation Needs: Patient Declined (2025)    PRAPARE - Transportation     Lack of Transportation (Medical): Patient declined     Lack of Transportation (Non-Medical): Patient declined   Housing Stability: Patient Declined (2025)    Housing Stability Vital Sign     Unable to Pay for Housing in the Last Year: Patient declined     Number of Times Moved in the Last Year: 0     Homeless in the Last Year: Patient declined       Family History   Problem Relation Age of Onset    Heart Disease Maternal Grandmother     Heart Disease Maternal Grandfather     Heart Attack Father         'minor heart attack' 40s        Allergies   Allergen Reactions    Penicillins      All cillins       There were no vitals filed for this visit.    Current Outpatient Medications   Medication Sig Dispense Refill    vitamin D (ERGOCALCIFEROL) 1.25 MG (65482 UT) CAPS capsule Take 1 capsule by mouth once a week 12 capsule 0    pantoprazole (PROTONIX) 40 MG tablet Take 1 tablet by mouth daily 34 tablet 2    FLUoxetine (PROZAC) 40 MG capsule Take 1 capsule by mouth daily 30 capsule 11    topiramate (TOPAMAX) 100 MG tablet Take 1 tablet by mouth 2 times daily 60 tablet 11    naproxen (NAPROSYN) 500 MG tablet Take 1 po bid PRN 60 tablet 3    FLUoxetine (PROZAC) 40 MG capsule Take 1

## 2025-05-28 ENCOUNTER — HOSPITAL ENCOUNTER (OUTPATIENT)
Dept: PAIN MANAGEMENT | Facility: CLINIC | Age: 35
Discharge: HOME | End: 2025-05-28
Payer: COMMERCIAL

## 2025-05-28 VITALS
RESPIRATION RATE: 16 BRPM | HEIGHT: 71 IN | BODY MASS INDEX: 33.74 KG/M2 | DIASTOLIC BLOOD PRESSURE: 80 MMHG | SYSTOLIC BLOOD PRESSURE: 128 MMHG | WEIGHT: 241 LBS | OXYGEN SATURATION: 96 % | TEMPERATURE: 97.8 F | HEART RATE: 71 BPM

## 2025-05-28 DIAGNOSIS — R52 PAIN MANAGEMENT: ICD-10-CM

## 2025-05-28 DIAGNOSIS — M47.817 LUMBOSACRAL SPONDYLOSIS WITHOUT MYELOPATHY: ICD-10-CM

## 2025-05-28 PROCEDURE — 6360000004 HC RX CONTRAST MEDICATION: Performed by: ANESTHESIOLOGY

## 2025-05-28 PROCEDURE — 64494 INJ PARAVERT F JNT L/S 2 LEV: CPT

## 2025-05-28 PROCEDURE — 64493 INJ PARAVERT F JNT L/S 1 LEV: CPT | Performed by: ANESTHESIOLOGY

## 2025-05-28 PROCEDURE — 64494 INJ PARAVERT F JNT L/S 2 LEV: CPT | Performed by: ANESTHESIOLOGY

## 2025-05-28 PROCEDURE — 64493 INJ PARAVERT F JNT L/S 1 LEV: CPT

## 2025-05-28 PROCEDURE — 6360000002 HC RX W HCPCS: Performed by: ANESTHESIOLOGY

## 2025-05-28 PROCEDURE — 99152 MOD SED SAME PHYS/QHP 5/>YRS: CPT | Performed by: ANESTHESIOLOGY

## 2025-05-28 RX ORDER — BUPIVACAINE HYDROCHLORIDE 5 MG/ML
INJECTION, SOLUTION EPIDURAL; INTRACAUDAL; PERINEURAL
Status: COMPLETED | OUTPATIENT
Start: 2025-05-28 | End: 2025-05-28

## 2025-05-28 RX ORDER — FENTANYL CITRATE 50 UG/ML
INJECTION, SOLUTION INTRAMUSCULAR; INTRAVENOUS
Status: COMPLETED | OUTPATIENT
Start: 2025-05-28 | End: 2025-05-28

## 2025-05-28 RX ORDER — LIDOCAINE HYDROCHLORIDE 10 MG/ML
INJECTION, SOLUTION EPIDURAL; INFILTRATION; INTRACAUDAL; PERINEURAL
Status: COMPLETED | OUTPATIENT
Start: 2025-05-28 | End: 2025-05-28

## 2025-05-28 RX ORDER — MIDAZOLAM HYDROCHLORIDE 2 MG/2ML
INJECTION, SOLUTION INTRAMUSCULAR; INTRAVENOUS
Status: COMPLETED | OUTPATIENT
Start: 2025-05-28 | End: 2025-05-28

## 2025-05-28 RX ADMIN — LIDOCAINE HYDROCHLORIDE 5 ML: 10 INJECTION, SOLUTION EPIDURAL; INFILTRATION; INTRACAUDAL; PERINEURAL at 09:06

## 2025-05-28 RX ADMIN — BUPIVACAINE HYDROCHLORIDE 6 ML: 5 INJECTION, SOLUTION EPIDURAL; INTRACAUDAL; PERINEURAL at 09:08

## 2025-05-28 RX ADMIN — IOHEXOL 3 ML: 180 INJECTION INTRAVENOUS at 09:08

## 2025-05-28 RX ADMIN — MIDAZOLAM HYDROCHLORIDE 1 MG: 1 INJECTION, SOLUTION INTRAMUSCULAR; INTRAVENOUS at 09:06

## 2025-05-28 RX ADMIN — FENTANYL CITRATE 50 MCG: 50 INJECTION, SOLUTION INTRAMUSCULAR; INTRAVENOUS at 09:06

## 2025-05-28 ASSESSMENT — PAIN SCALES - GENERAL: PAINLEVEL_OUTOF10: 7

## 2025-05-28 ASSESSMENT — PAIN DESCRIPTION - ORIENTATION: ORIENTATION: LOWER;RIGHT;LEFT

## 2025-05-28 ASSESSMENT — PAIN DESCRIPTION - FREQUENCY: FREQUENCY: CONTINUOUS

## 2025-05-28 ASSESSMENT — PAIN - FUNCTIONAL ASSESSMENT: PAIN_FUNCTIONAL_ASSESSMENT: PREVENTS OR INTERFERES SOME ACTIVE ACTIVITIES AND ADLS

## 2025-05-28 ASSESSMENT — PAIN DESCRIPTION - DESCRIPTORS: DESCRIPTORS: SPASM;ACHING;STABBING

## 2025-05-28 ASSESSMENT — PAIN DESCRIPTION - LOCATION: LOCATION: BACK

## 2025-05-28 ASSESSMENT — PAIN DESCRIPTION - PAIN TYPE: TYPE: CHRONIC PAIN

## 2025-05-28 NOTE — OP NOTE
Patient Name: Jeremi Pierce   YOB: 1990  Room/Bed: Room/bed info not found  Medical Record Number: [id number]  Date: 5/28/2025       Sedation/ Anesthesia Plan:   intravenous sedation   as needed.    Medications Planned:   midazolam (Versed) / Fentanyl  Intravenously  as needed.    Preoperative Diagnosis: Lumbar spondylosis w/o myelopathy or radiculopathy  Postoperative Diagnosis: Lumbar spondylosis w/o myelopathy or radiculopathy  Blood Loss: none    Procedure Performed:  Bilateral Lumbar Medial Branch nerve Blocks at the transverse processes of  L4, L5 and sacral  ala under fluoroscopy guidance    Procedure:      The Patient was seen in the preop area, chart was reviewed, informed consent was obtained. Patient was taken to procedure room and was placed in prone position. Vital signs were monitored through out the  Procedure. A time out was completed.  The skin over the back was prepped and draped in sterile manner.     The target point was marked at the junction of Transverse process and superior articular process at the target levels.  Skin and deep tissues were anesthetized with 1 % lidocaine. A 25-gauge needlele was advanced to the target spots under fluoroscopy guidance in AP / Lateral and Oblique views.     Then after negative aspiration contrast dye was injected with live fluoroscopy in AP views that showed  spread of the contrast with no epidural space and no vascular runoff or intrathecal spread.    Finally 0.5 ml of treatment solution 0.5% BUPIVACAINE  was injected at each level.  The needle was removed and a Band-Aid was placed over the needle  insertion site.  The patient's vital signs remained stable and the patient tolerated the procedure well.      Electronically signed by Cuco Enriquez MD on 5/28/2025 at 9:28 AM    SEDATION NOTE:    ASA CLASSIFICATION  3  MP   CLASSIFICATION  3    Moderate intravenous conscious sedation was supervised by Dr. Enriquez  The patient was independently

## 2025-05-28 NOTE — INTERVAL H&P NOTE
Update History & Physical    The patient's History and Physical of May 8, 2025 was reviewed with the patient and I examined the patient. There was no change. The surgical site was confirmed by the patient and me.     Plan: The risks, benefits, expected outcome, and alternative to the recommended procedure have been discussed with the patient. Patient understands and wants to proceed with the procedure.     ASA 3  MP 3    Electronically signed by Ccuo Enriquez MD on 5/28/2025 at 8:35 AM

## 2025-05-28 NOTE — DISCHARGE INSTRUCTIONS
Discharge Instructions following Sedation or Anesthesia:  You have  received  a sedative/anesthetic therefore, you should not consume any alcoholic beverages for minimum of 12 hours.  Do not drive or operate machinery for 24 hours.  Do not sign legal documents for 24 hours.  Dizziness, drowsiness, and unsteadiness may occur.  Rest when need to.  Increase diet as tolerated.  Keep up on fluids if diet allows.      General Instructions:  Do not take a tub bath for 72 hours after procedure (this includes hot tubs and swimming pools).  You may shower, but avoid hot water to injection site.   Avoid strenuous activity TODAY especially if you experience dizziness.   Remove band-aid the next day.  Wash off any residual iodine   Do not use heat, heating pad, or any other heating device over the injection site for 3 days after the procedure.  If you experience pain after your procedure, you may continue with your current pain medication as prescribed.  (DO NOT INCREASE YOUR PAIN MEDICATION WITHOUT TALKING TO DOCTOR)  Soreness and pain at injection site is common, may use ice to reduce soreness.    Please complete pain diary as instructed.     Call [facility] at [phone] if you experience:   Fever, chills or temperature over 100    Vomiting, Headache, persistent stiff neck, nausea, blurred vision   Difficulty in urinating or unable to urinate with 8 hours   Increase in weakness, numbness or loss of function   Increased redness, swelling or drainage at the injection site

## 2025-05-29 ENCOUNTER — TELEPHONE (OUTPATIENT)
Dept: GASTROENTEROLOGY | Age: 35
End: 2025-05-29

## 2025-05-29 ENCOUNTER — TELEPHONE (OUTPATIENT)
Dept: PAIN MANAGEMENT | Age: 35
End: 2025-05-29

## 2025-05-30 NOTE — TELEPHONE ENCOUNTER
Writer called to reschedule 7/21/25 [address] is out of office - [facility]

## 2025-06-03 ENCOUNTER — OFFICE VISIT (OUTPATIENT)
Dept: ORTHOPEDIC SURGERY | Age: 35
End: 2025-06-03
Payer: COMMERCIAL

## 2025-06-03 VITALS — HEIGHT: 71 IN | RESPIRATION RATE: 14 BRPM | WEIGHT: 241 LBS | BODY MASS INDEX: 33.74 KG/M2

## 2025-06-03 DIAGNOSIS — M54.6 CHRONIC MIDLINE THORACIC BACK PAIN: ICD-10-CM

## 2025-06-03 DIAGNOSIS — G89.29 CHRONIC MIDLINE THORACIC BACK PAIN: ICD-10-CM

## 2025-06-03 DIAGNOSIS — G89.29 CHRONIC MIDLINE LOW BACK PAIN WITHOUT SCIATICA: Primary | ICD-10-CM

## 2025-06-03 DIAGNOSIS — M54.50 CHRONIC MIDLINE LOW BACK PAIN WITHOUT SCIATICA: Primary | ICD-10-CM

## 2025-06-03 PROCEDURE — 99213 OFFICE O/P EST LOW 20 MIN: CPT | Performed by: ORTHOPAEDIC SURGERY

## 2025-06-03 NOTE — PROGRESS NOTES
Patient ID: Jeremi Pierce is a 34 y.o. male    Chief Compliant:  No chief complaint on file.       Diagnostic imaging:    MRI lumbar spine is reviewed patient with some degenerative changes with disc dehydration some very minimal bulging and disc space collapse at L4-5 and L5-S1    Assessment and Plan:  1. Chronic midline low back pain without sciatica    2. Chronic midline thoracic back pain        Unfortunate this time I find no significant easily identifiable pain generator in the lumbar spine    At the patient's last visit had planned on obtaining MRIs of the thoracic and MRI of the lumbar if he failed therapy MRI of the lumbar was ordered in the interim by pain management        MRI Thoracic spine    Follow up after MRI    HPI:  This is a 34 y.o. male who presents to the clinic today for follow up evaluation of low back.     Chronic low back pain with occasional radiation into the leg     Pain in periscapular region radiating up into the neck       He states that the injection provided at pain management provided transient relief for 1 day, and then he states he experienced a new pain different from his previous symptoms.    Review of Systems   All other systems reviewed and are negative.      Past History:    Current Outpatient Medications:     vitamin D (ERGOCALCIFEROL) 1.25 MG (48955 UT) CAPS capsule, Take 1 capsule by mouth once a week, Disp: 12 capsule, Rfl: 0    pantoprazole (PROTONIX) 40 MG tablet, Take 1 tablet by mouth daily, Disp: 34 tablet, Rfl: 2    FLUoxetine (PROZAC) 40 MG capsule, Take 1 capsule by mouth daily, Disp: 30 capsule, Rfl: 11    topiramate (TOPAMAX) 100 MG tablet, Take 1 tablet by mouth 2 times daily, Disp: 60 tablet, Rfl: 11    naproxen (NAPROSYN) 500 MG tablet, Take 1 po bid PRN, Disp: 60 tablet, Rfl: 3    magnesium 30 MG tablet, Take 1 tablet by mouth 2 times daily (Patient not taking: Reported on 5/28/2025), Disp: , Rfl:     traZODone (DESYREL) 150 MG tablet, Take 1 tablet by 
with patient

## 2025-06-05 ENCOUNTER — TELEPHONE (OUTPATIENT)
Dept: PAIN MANAGEMENT | Age: 35
End: 2025-06-05

## 2025-06-05 NOTE — TELEPHONE ENCOUNTER
Procedure:  Bilateral Lumbar Medial Branch nerve Blocks at the transverse processes of  L4, L5 and sacral  ala under fluoroscopy guidance     DOS: 5/28/25    Pain level before procedure with activity: 7/10    Pain with activity after procedure: 4/10    What activities done the day of procedure: Shoveling stone around the pool - pt states that he had a different type of pain     What percentage of  pain relief from procedure did you receive: 20%    Success: No     OV  Scheduled  6/26/25

## 2025-06-14 DIAGNOSIS — M54.50 CHRONIC MIDLINE LOW BACK PAIN WITHOUT SCIATICA: ICD-10-CM

## 2025-06-14 DIAGNOSIS — G89.29 CHRONIC MIDLINE LOW BACK PAIN WITHOUT SCIATICA: ICD-10-CM

## 2025-06-16 RX ORDER — DANTROLENE SODIUM 25 MG/1
25 CAPSULE ORAL 2 TIMES DAILY
Qty: 60 CAPSULE | Refills: 0 | OUTPATIENT
Start: 2025-06-16

## 2025-06-19 ENCOUNTER — TELEPHONE (OUTPATIENT)
Dept: INTERNAL MEDICINE CLINIC | Age: 35
End: 2025-06-19

## 2025-06-26 ENCOUNTER — HOSPITAL ENCOUNTER (OUTPATIENT)
Dept: PAIN MANAGEMENT | Age: 35
Discharge: HOME | End: 2025-06-26
Payer: COMMERCIAL

## 2025-06-26 VITALS — BODY MASS INDEX: 33.74 KG/M2 | WEIGHT: 241 LBS | HEIGHT: 71 IN

## 2025-06-26 DIAGNOSIS — M47.817 LUMBOSACRAL SPONDYLOSIS WITHOUT MYELOPATHY: Primary | ICD-10-CM

## 2025-06-26 PROCEDURE — 99213 OFFICE O/P EST LOW 20 MIN: CPT

## 2025-06-26 RX ORDER — DANTROLENE SODIUM 25 MG/1
25 CAPSULE ORAL 2 TIMES DAILY
Qty: 60 CAPSULE | Refills: 1 | Status: SHIPPED | OUTPATIENT
Start: 2025-06-26 | End: 2025-08-25

## 2025-06-26 ASSESSMENT — ENCOUNTER SYMPTOMS
BACK PAIN: 1
RESPIRATORY NEGATIVE: 1
EYES NEGATIVE: 1

## 2025-06-26 ASSESSMENT — PAIN SCALES - GENERAL: PAINLEVEL_OUTOF10: 0

## 2025-06-26 NOTE — H&P (VIEW-ONLY)
The patient is a 34 y.o.Non- / non  male.    Chief Complaint   Patient presents with    Back Pain        HPI    Patient is here today for: failed MBB  Pain level: 0  Character:    Radiating: No  Weakness or numbness: No  Aggravating Factors: laying around   Alleviating Factors: nothing   Constant or intermitting: intermitting   Bladder/bowel loss: no      Past Medical History:   Diagnosis Date    Abdominal pain     Anxiety     Bloating     Constipation     Excessive daytime sleepiness     GERD (gastroesophageal reflux disease)     Headache     Migraines     Panic attacks     Poor venous access     Rectal bleeding     Sleep apnea     MILD, HAS CPAP BUT DOESN'T USE.    Sleep apnea     wears CPAP        Past Surgical History:   Procedure Laterality Date    COLONOSCOPY N/A 2023    COLONOSCOPY WITH BIOPSY performed by Phan Mccauley MD at [facility] ENDO    UPPER GASTROINTESTINAL ENDOSCOPY N/A 2023    EGD BIOPSY performed by Phan Mccauley MD at [facility] ENDO       Social History     Socioeconomic History    Marital status:      Spouse name: None    Number of children: None    Years of education: None    Highest education level: None   Tobacco Use    Smoking status: Former     Current packs/day: 0.00     Average packs/day: 1 pack/day for 10.0 years (10.0 ttl pk-yrs)     Types: Cigarettes     Start date: 10/23/2006     Quit date: 10/23/2016     Years since quittin.6     Passive exposure: Current (Wife - vape)    Smokeless tobacco: Never    Tobacco comments:     quit oct 2016   Vaping Use    Vaping status: Never Used   Substance and Sexual Activity    Alcohol use: Yes     Alcohol/week: 0.0 standard drinks of alcohol     Comment: social    Drug use: No    Sexual activity: Yes     Partners: Female     Social Drivers of Health     Food Insecurity: Patient Declined (2025)    Hunger Vital Sign     Worried About Running Out of Food in the Last Year: Patient declined     Ran Out of Food

## 2025-06-26 NOTE — PROGRESS NOTES
The patient is a 34 y.o.Non- / non  male.    Chief Complaint   Patient presents with    Back Pain        HPI    Patient is here today for: failed MBB  Pain level: 0  Character:    Radiating: No  Weakness or numbness: No  Aggravating Factors: laying around   Alleviating Factors: nothing   Constant or intermitting: intermitting   Bladder/bowel loss: no      Past Medical History:   Diagnosis Date    Abdominal pain     Anxiety     Bloating     Constipation     Excessive daytime sleepiness     GERD (gastroesophageal reflux disease)     Headache     Migraines     Panic attacks     Poor venous access     Rectal bleeding     Sleep apnea     MILD, HAS CPAP BUT DOESN'T USE.    Sleep apnea     wears CPAP        Past Surgical History:   Procedure Laterality Date    COLONOSCOPY N/A 2023    COLONOSCOPY WITH BIOPSY performed by Phan Mccauley MD at [facility] ENDO    UPPER GASTROINTESTINAL ENDOSCOPY N/A 2023    EGD BIOPSY performed by Phan Mccauley MD at [facility] ENDO       Social History     Socioeconomic History    Marital status:      Spouse name: None    Number of children: None    Years of education: None    Highest education level: None   Tobacco Use    Smoking status: Former     Current packs/day: 0.00     Average packs/day: 1 pack/day for 10.0 years (10.0 ttl pk-yrs)     Types: Cigarettes     Start date: 10/23/2006     Quit date: 10/23/2016     Years since quittin.6     Passive exposure: Current (Wife - vape)    Smokeless tobacco: Never    Tobacco comments:     quit oct 2016   Vaping Use    Vaping status: Never Used   Substance and Sexual Activity    Alcohol use: Yes     Alcohol/week: 0.0 standard drinks of alcohol     Comment: social    Drug use: No    Sexual activity: Yes     Partners: Female     Social Drivers of Health     Food Insecurity: Patient Declined (2025)    Hunger Vital Sign     Worried About Running Out of Food in the Last Year: Patient declined     Ran Out of Food

## 2025-07-02 ENCOUNTER — TELEPHONE (OUTPATIENT)
Dept: INTERNAL MEDICINE CLINIC | Age: 35
End: 2025-07-02

## 2025-07-02 NOTE — TELEPHONE ENCOUNTER
LVM - informed patient that appointment on 7/22 is going to have to be cancelled. Advised patient to either call back or [facility] and [facility].

## 2025-07-03 ENCOUNTER — HOSPITAL ENCOUNTER (OUTPATIENT)
Dept: MRI IMAGING | Age: 35
Discharge: HOME | End: 2025-07-05
Attending: ORTHOPAEDIC SURGERY
Payer: COMMERCIAL

## 2025-07-03 ENCOUNTER — OFFICE VISIT (OUTPATIENT)
Dept: NEUROLOGY | Age: 35
End: 2025-07-03
Payer: COMMERCIAL

## 2025-07-03 VITALS
WEIGHT: 249.2 LBS | SYSTOLIC BLOOD PRESSURE: 130 MMHG | HEIGHT: 71 IN | BODY MASS INDEX: 34.89 KG/M2 | DIASTOLIC BLOOD PRESSURE: 84 MMHG | HEART RATE: 67 BPM

## 2025-07-03 DIAGNOSIS — G89.29 CHRONIC MIDLINE LOW BACK PAIN WITHOUT SCIATICA: ICD-10-CM

## 2025-07-03 DIAGNOSIS — M54.50 CHRONIC MIDLINE LOW BACK PAIN WITHOUT SCIATICA: ICD-10-CM

## 2025-07-03 DIAGNOSIS — M54.6 CHRONIC MIDLINE THORACIC BACK PAIN: ICD-10-CM

## 2025-07-03 DIAGNOSIS — G89.29 CHRONIC MIDLINE THORACIC BACK PAIN: ICD-10-CM

## 2025-07-03 DIAGNOSIS — G47.19 EXCESSIVE DAYTIME SLEEPINESS: Primary | ICD-10-CM

## 2025-07-03 DIAGNOSIS — G43.009 MIGRAINE WITHOUT AURA AND WITHOUT STATUS MIGRAINOSUS, NOT INTRACTABLE: ICD-10-CM

## 2025-07-03 DIAGNOSIS — F41.0 PANIC ATTACKS: ICD-10-CM

## 2025-07-03 PROCEDURE — 72146 MRI CHEST SPINE W/O DYE: CPT

## 2025-07-03 PROCEDURE — 99214 OFFICE O/P EST MOD 30 MIN: CPT | Performed by: PSYCHIATRY & NEUROLOGY

## 2025-07-03 RX ORDER — MODAFINIL 200 MG/1
200 TABLET ORAL DAILY
Qty: 30 TABLET | Refills: 2 | Status: SHIPPED | OUTPATIENT
Start: 2025-07-03 | End: 2025-10-02

## 2025-07-03 NOTE — PROGRESS NOTES
Active Problem common migraine headaches on topamax  . History of sleep apnea to have sleep study  . Panic attacks readjusting prozac . The condition he had followup sleep study in March showing no apnea before sleep apnea being mild borderline .  Polysomnogram apnea hypopnea index 2.9 episodes per hour , March 2025 . He reports that he will get anxious when he is in large crowds with body feeling excited needing to get away from situation .Prozac does help. He is having headaches every day over frontal head of pressure throbbing of grade 7 to 10 lasting from hours to 2 to 3 days taking tylenol averaging 2 per day . He is on topamax 100 mg po bid . He has tried maxalt , imitrex and fioricet that have not helped . He does fatigue with some nodding during walking day .He is going to bed fron 7 to 8PM falling asleep within 1 to 1 1/2 hours sleeping to 4 AM do to work schedule sleeping through the night . There is snoring . He is on desyrel 100 mg po qhs that consolidates sleep .  Significant medications prozac 40 mg po qd ,  topamax 100 mg po bid , desyrel 100 mg po qhs . Testing MRI lumbar spine mild degenerative thecal sac narrowing and neural foraminal stenosis at L4-5 and L5-S1 , February 2022 . Polysomnogram apnea hypopea index 5.6 episodes per hour with oxyhemoglobin desaturation to 87 % , February 2020 .  Polysomnogram apnea hypopnea index 2.9 episodes per hour , March 2025      Past Medical History:   Diagnosis Date    Abdominal pain     Anxiety     Bloating     Constipation     Excessive daytime sleepiness     GERD (gastroesophageal reflux disease)     Headache     Migraines     Panic attacks     Poor venous access     Rectal bleeding     Sleep apnea     MILD, HAS CPAP BUT DOESN'T USE.    Sleep apnea     wears CPAP       Past Surgical History:   Procedure Laterality Date    COLONOSCOPY N/A 6/30/2023    COLONOSCOPY WITH BIOPSY performed by Phan Mccauley MD at [facility]    UPPER GASTROINTESTINAL

## 2025-07-23 ENCOUNTER — HOSPITAL ENCOUNTER (OUTPATIENT)
Dept: PAIN MANAGEMENT | Facility: CLINIC | Age: 35
Discharge: HOME | End: 2025-07-23
Payer: COMMERCIAL

## 2025-07-23 VITALS
OXYGEN SATURATION: 97 % | BODY MASS INDEX: 33.74 KG/M2 | RESPIRATION RATE: 11 BRPM | WEIGHT: 241 LBS | DIASTOLIC BLOOD PRESSURE: 66 MMHG | SYSTOLIC BLOOD PRESSURE: 122 MMHG | HEART RATE: 63 BPM | HEIGHT: 71 IN | TEMPERATURE: 98.3 F

## 2025-07-23 DIAGNOSIS — M47.817 LUMBOSACRAL SPONDYLOSIS WITHOUT MYELOPATHY: ICD-10-CM

## 2025-07-23 DIAGNOSIS — R52 PAIN MANAGEMENT: ICD-10-CM

## 2025-07-23 PROCEDURE — 6360000002 HC RX W HCPCS: Performed by: ANESTHESIOLOGY

## 2025-07-23 PROCEDURE — 6360000004 HC RX CONTRAST MEDICATION: Performed by: ANESTHESIOLOGY

## 2025-07-23 PROCEDURE — 64494 INJ PARAVERT F JNT L/S 2 LEV: CPT | Performed by: ANESTHESIOLOGY

## 2025-07-23 PROCEDURE — 99152 MOD SED SAME PHYS/QHP 5/>YRS: CPT | Performed by: ANESTHESIOLOGY

## 2025-07-23 PROCEDURE — 64493 INJ PARAVERT F JNT L/S 1 LEV: CPT

## 2025-07-23 PROCEDURE — 64493 INJ PARAVERT F JNT L/S 1 LEV: CPT | Performed by: ANESTHESIOLOGY

## 2025-07-23 PROCEDURE — 64494 INJ PARAVERT F JNT L/S 2 LEV: CPT

## 2025-07-23 RX ORDER — LIDOCAINE HYDROCHLORIDE 10 MG/ML
INJECTION, SOLUTION EPIDURAL; INFILTRATION; INTRACAUDAL; PERINEURAL
Status: COMPLETED | OUTPATIENT
Start: 2025-07-23 | End: 2025-07-23

## 2025-07-23 RX ORDER — PANTOPRAZOLE SODIUM 40 MG/1
40 TABLET, DELAYED RELEASE ORAL DAILY
Qty: 34 TABLET | Refills: 0 | Status: SHIPPED | OUTPATIENT
Start: 2025-07-23

## 2025-07-23 RX ORDER — BUPIVACAINE HYDROCHLORIDE 5 MG/ML
INJECTION, SOLUTION EPIDURAL; INTRACAUDAL; PERINEURAL
Status: COMPLETED | OUTPATIENT
Start: 2025-07-23 | End: 2025-07-23

## 2025-07-23 RX ORDER — MIDAZOLAM HYDROCHLORIDE 2 MG/2ML
INJECTION, SOLUTION INTRAMUSCULAR; INTRAVENOUS
Status: COMPLETED | OUTPATIENT
Start: 2025-07-23 | End: 2025-07-23

## 2025-07-23 RX ORDER — FENTANYL CITRATE 50 UG/ML
INJECTION, SOLUTION INTRAMUSCULAR; INTRAVENOUS
Status: COMPLETED | OUTPATIENT
Start: 2025-07-23 | End: 2025-07-23

## 2025-07-23 RX ADMIN — LIDOCAINE HYDROCHLORIDE 5 ML: 10 INJECTION, SOLUTION EPIDURAL; INFILTRATION; INTRACAUDAL; PERINEURAL at 10:03

## 2025-07-23 RX ADMIN — BUPIVACAINE HYDROCHLORIDE 6 ML: 5 INJECTION, SOLUTION EPIDURAL; INTRACAUDAL; PERINEURAL at 10:05

## 2025-07-23 RX ADMIN — MIDAZOLAM HYDROCHLORIDE 1 MG: 1 INJECTION, SOLUTION INTRAMUSCULAR; INTRAVENOUS at 10:02

## 2025-07-23 RX ADMIN — FENTANYL CITRATE 50 MCG: 50 INJECTION, SOLUTION INTRAMUSCULAR; INTRAVENOUS at 10:02

## 2025-07-23 RX ADMIN — IOHEXOL 3 ML: 180 INJECTION INTRAVENOUS at 10:04

## 2025-07-23 ASSESSMENT — PAIN DESCRIPTION - LOCATION: LOCATION: BACK

## 2025-07-23 ASSESSMENT — PAIN - FUNCTIONAL ASSESSMENT
PAIN_FUNCTIONAL_ASSESSMENT: PREVENTS OR INTERFERES SOME ACTIVE ACTIVITIES AND ADLS
PAIN_FUNCTIONAL_ASSESSMENT: NONE - DENIES PAIN

## 2025-07-23 ASSESSMENT — PAIN DESCRIPTION - ONSET: ONSET: ON-GOING

## 2025-07-23 ASSESSMENT — PAIN DESCRIPTION - ORIENTATION: ORIENTATION: LOWER

## 2025-07-23 ASSESSMENT — PAIN DESCRIPTION - FREQUENCY: FREQUENCY: CONTINUOUS

## 2025-07-23 ASSESSMENT — PAIN DESCRIPTION - DESCRIPTORS: DESCRIPTORS: DISCOMFORT

## 2025-07-23 ASSESSMENT — PAIN SCALES - GENERAL: PAINLEVEL_OUTOF10: 6

## 2025-07-23 ASSESSMENT — PAIN DESCRIPTION - PAIN TYPE: TYPE: CHRONIC PAIN

## 2025-07-23 NOTE — INTERVAL H&P NOTE
Update History & Physical    The patient's History and Physical of June 26, 2025 was reviewed with the patient and I examined the patient. There was no change. The surgical site was confirmed by the patient and me.     Plan: The risks, benefits, expected outcome, and alternative to the recommended procedure have been discussed with the patient. Patient understands and wants to proceed with the procedure.     ASA 3  MP 3    Electronically signed by Cuco Enriquez MD on 7/23/2025 at 9:33 AM

## 2025-07-23 NOTE — DISCHARGE INSTRUCTIONS
Discharge Instructions following Sedation or Anesthesia:  You have  received  a sedative/anesthetic therefore, you should not consume any alcoholic beverages for minimum of 12 hours.  Do not drive or operate machinery for 24 hours.  Do not sign legal documents for 24 hours.  Dizziness, drowsiness, and unsteadiness may occur.  Rest when need to.  Increase diet as tolerated.  Keep up on fluids if diet allows.      General Instructions:  Do not take a tub bath for 72 hours after procedure (this includes hot tubs and swimming pools).  You may shower, but avoid hot water to injection site.   Avoid strenuous activity TODAY especially if you experience dizziness.   Remove band-aid the next day.  Wash off any residual iodine   Do not use heat, heating pad, or any other heating device over the injection site for 3 days after the procedure.  If you experience pain after your procedure, you may continue with your current pain medication as prescribed.  (DO NOT INCREASE YOUR PAIN MEDICATION WITHOUT TALKING TO DOCTOR)  Soreness and pain at injection site is common, may use ice to reduce soreness.    Please complete pain diary as instructed.     Call [facility] at [phone] if you experience:   Fever, chills or temperature over 100    Vomiting, Headache, persistent stiff neck, nausea, blurred vision   Difficulty in urinating or unable to urinate with 8 hours   Increase in weakness, numbness or loss of function   Increased redness, swelling or drainage at the injection site

## 2025-07-23 NOTE — OP NOTE
Patient Name: Jeremi Pierce   YOB: 1990  Room/Bed: Room/bed info not found  Medical Record Number: [id number]  Date: 7/23/2025       Sedation/ Anesthesia Plan:   intravenous sedation   as needed.    Medications Planned:   midazolam (Versed) / Fentanyl  Intravenously  as needed.    Preoperative Diagnosis: Lumbar spondylosis w/o myelopathy or radiculopathy  Postoperative Diagnosis: Lumbar spondylosis w/o myelopathy or radiculopathy  Blood Loss: none    Procedure Performed:  Bilateral Lumbar Medial Branch nerve Blocks at the transverse processes of  L4, L5 and sacral  ala under fluoroscopy guidance    Procedure:      The Patient was seen in the preop area, chart was reviewed, informed consent was obtained. Patient was taken to procedure room and was placed in prone position. Vital signs were monitored through out the  Procedure. A time out was completed.  The skin over the back was prepped and draped in sterile manner.     The target point was marked at the junction of Transverse process and superior articular process at the target levels.  Skin and deep tissues were anesthetized with 1 % lidocaine. A 25-gauge needlele was advanced to the target spots under fluoroscopy guidance in AP / Lateral and Oblique views.     Then after negative aspiration contrast dye was injected with live fluoroscopy in AP views that showed  spread of the contrast with no epidural space and no vascular runoff or intrathecal spread.    Finally 0.5 ml of treatment solution 0.5% BUPIVACAINE  was injected at each level.  The needle was removed and a Band-Aid was placed over the needle  insertion site.  The patient's vital signs remained stable and the patient tolerated the procedure well.      Electronically signed by Cuco Enriquez MD on 7/23/2025 at 10:10 AM    SEDATION NOTE:    ASA CLASSIFICATION  3  MP   CLASSIFICATION  3    Moderate intravenous conscious sedation was supervised by Dr. Enriquez  The patient was independently

## 2025-07-25 ENCOUNTER — TELEPHONE (OUTPATIENT)
Dept: PAIN MANAGEMENT | Age: 35
End: 2025-07-25

## 2025-07-25 NOTE — TELEPHONE ENCOUNTER
Writer called patient to go over MBB diary follow up questions, patient did not answer. Writer left .

## 2025-08-01 NOTE — PROGRESS NOTES
Patient not currently on antibiotics. Pre-op Instructions For Out-Patient Endoscopy Surgery    Medication Instructions:  Please stop herbs and any supplements now (includes vitamins and minerals). Please contact your surgeon and prescribing physician for pre-op instructions for any blood thinners. If you have inhalers/aerosol treatments at home, please use them the morning of your surgery and bring the inhalers with you to the hospital.    Please take the following medications the morning of your surgery with a sip of water:    TOPIRAMATE. Surgery Instructions:  After midnight before surgery:  Do not eat or drink anything, including water, mints, gum, and hard candy. You may brush your teeth without swallowing. No smoking, chewing tobacco, or street drugs. Please shower or bathe before surgery. Please do not wear any cologne, lotion, powder, jewelry, piercings, perfume, makeup, nail polish, hair accessories, or hair spray on the day of surgery. Wear loose comfortable clothing. Leave your valuables at home. Bring a storage case for any glasses/contacts. An adult who is responsible for you MUST drive you home and should be with you for the first 24 hours after surgery. The Day of Surgery:  Arrive at [facility] AT [facility] Surgery Entrance at the time directed by your surgeon and check in at the desk. If you have a living will or healthcare power of , please bring a copy. You will be taken to the pre-op holding area where you will be prepared for surgery. A physical assessment will be performed by a nurse practitioner or house officer. Your IV will be started and you will meet your anesthesiologist.    When you go to surgery, your family will be directed to the surgical waiting room, where the doctor should speak with them after your surgery. After surgery, you will be taken to the recovery room and or short stay unit for recovery and preparation for discharge.     INSTRUCTIONS READ TO WIFE Tej Fernandez

## 2025-08-13 ENCOUNTER — HOSPITAL ENCOUNTER (OUTPATIENT)
Dept: PAIN MANAGEMENT | Facility: CLINIC | Age: 35
Discharge: HOME | End: 2025-08-13
Payer: COMMERCIAL

## 2025-08-13 VITALS
WEIGHT: 241 LBS | HEIGHT: 71 IN | OXYGEN SATURATION: 96 % | DIASTOLIC BLOOD PRESSURE: 80 MMHG | TEMPERATURE: 98 F | SYSTOLIC BLOOD PRESSURE: 123 MMHG | RESPIRATION RATE: 13 BRPM | BODY MASS INDEX: 33.74 KG/M2 | HEART RATE: 59 BPM

## 2025-08-13 DIAGNOSIS — M47.817 LUMBOSACRAL SPONDYLOSIS WITHOUT MYELOPATHY: Primary | ICD-10-CM

## 2025-08-13 DIAGNOSIS — R52 PAIN MANAGEMENT: ICD-10-CM

## 2025-08-13 PROCEDURE — 99152 MOD SED SAME PHYS/QHP 5/>YRS: CPT | Performed by: ANESTHESIOLOGY

## 2025-08-13 PROCEDURE — 64636 DESTROY L/S FACET JNT ADDL: CPT | Performed by: ANESTHESIOLOGY

## 2025-08-13 PROCEDURE — 64636 DESTROY L/S FACET JNT ADDL: CPT

## 2025-08-13 PROCEDURE — 6360000002 HC RX W HCPCS: Performed by: ANESTHESIOLOGY

## 2025-08-13 PROCEDURE — 64635 DESTROY LUMB/SAC FACET JNT: CPT | Performed by: ANESTHESIOLOGY

## 2025-08-13 PROCEDURE — 64635 DESTROY LUMB/SAC FACET JNT: CPT

## 2025-08-13 RX ORDER — LIDOCAINE HYDROCHLORIDE 10 MG/ML
INJECTION, SOLUTION EPIDURAL; INFILTRATION; INTRACAUDAL; PERINEURAL
Status: COMPLETED | OUTPATIENT
Start: 2025-08-13 | End: 2025-08-13

## 2025-08-13 RX ORDER — MIDAZOLAM HYDROCHLORIDE 2 MG/2ML
INJECTION, SOLUTION INTRAMUSCULAR; INTRAVENOUS
Status: COMPLETED | OUTPATIENT
Start: 2025-08-13 | End: 2025-08-13

## 2025-08-13 RX ORDER — FENTANYL CITRATE 50 UG/ML
INJECTION, SOLUTION INTRAMUSCULAR; INTRAVENOUS
Status: COMPLETED | OUTPATIENT
Start: 2025-08-13 | End: 2025-08-13

## 2025-08-13 RX ORDER — LIDOCAINE HYDROCHLORIDE 40 MG/ML
INJECTION, SOLUTION RETROBULBAR
Status: COMPLETED | OUTPATIENT
Start: 2025-08-13 | End: 2025-08-13

## 2025-08-13 RX ADMIN — FENTANYL CITRATE 50 MCG: 50 INJECTION, SOLUTION INTRAMUSCULAR; INTRAVENOUS at 09:31

## 2025-08-13 RX ADMIN — LIDOCAINE HYDROCHLORIDE 8 ML: 10 INJECTION, SOLUTION EPIDURAL; INFILTRATION; INTRACAUDAL; PERINEURAL at 09:31

## 2025-08-13 RX ADMIN — LIDOCAINE HYDROCHLORIDE 5 ML: 40 INJECTION, SOLUTION RETROBULBAR; TOPICAL at 09:35

## 2025-08-13 RX ADMIN — MIDAZOLAM HYDROCHLORIDE 1 MG: 1 INJECTION, SOLUTION INTRAMUSCULAR; INTRAVENOUS at 09:31

## 2025-08-13 ASSESSMENT — PAIN SCALES - GENERAL
PAINLEVEL_OUTOF10: 4
PAINLEVEL_OUTOF10: 6

## 2025-08-13 ASSESSMENT — PAIN DESCRIPTION - ORIENTATION: ORIENTATION: LOWER

## 2025-08-13 ASSESSMENT — PAIN DESCRIPTION - DESCRIPTORS
DESCRIPTORS: DULL;SHOOTING
DESCRIPTORS: ACHING

## 2025-08-13 ASSESSMENT — PAIN DESCRIPTION - DIRECTION: RADIATING_TOWARDS: BILAT HIPS AND LEGS

## 2025-08-13 ASSESSMENT — PAIN - FUNCTIONAL ASSESSMENT
PAIN_FUNCTIONAL_ASSESSMENT: 0-10
PAIN_FUNCTIONAL_ASSESSMENT: PREVENTS OR INTERFERES SOME ACTIVE ACTIVITIES AND ADLS

## 2025-08-13 ASSESSMENT — PAIN DESCRIPTION - PAIN TYPE: TYPE: CHRONIC PAIN

## 2025-08-13 ASSESSMENT — PAIN DESCRIPTION - ONSET: ONSET: PROGRESSIVE

## 2025-08-13 ASSESSMENT — PAIN DESCRIPTION - FREQUENCY: FREQUENCY: INTERMITTENT

## 2025-08-13 ASSESSMENT — PAIN DESCRIPTION - LOCATION: LOCATION: BACK

## 2025-08-21 RX ORDER — DANTROLENE SODIUM 25 MG/1
25 CAPSULE ORAL 2 TIMES DAILY
Qty: 60 CAPSULE | Refills: 0 | Status: SHIPPED | OUTPATIENT
Start: 2025-08-21

## 2025-08-22 RX ORDER — PANTOPRAZOLE SODIUM 40 MG/1
40 TABLET, DELAYED RELEASE ORAL DAILY
Qty: 34 TABLET | Refills: 0 | Status: SHIPPED | OUTPATIENT
Start: 2025-08-22

## (undated) DEVICE — GLOVE ORANGE PI 7   MSG9070

## (undated) DEVICE — ENDO KIT W/SYRINGE: Brand: MEDLINE INDUSTRIES, INC.

## (undated) DEVICE — DEFENDO AIR WATER SUCTION AND BIOPSY VALVE KIT FOR  OLYMPUS: Brand: DEFENDO AIR/WATER/SUCTION AND BIOPSY VALVE

## (undated) DEVICE — BITEBLOCK 54FR W/ DENT RIM BLOX

## (undated) DEVICE — FORCEPS BX L240CM WRK CHN 2.8MM STD CAP W/ NDL MIC MESH